# Patient Record
Sex: MALE | Race: WHITE
[De-identification: names, ages, dates, MRNs, and addresses within clinical notes are randomized per-mention and may not be internally consistent; named-entity substitution may affect disease eponyms.]

---

## 2014-05-26 VITALS — DIASTOLIC BLOOD PRESSURE: 88 MMHG | SYSTOLIC BLOOD PRESSURE: 158 MMHG

## 2017-01-25 ENCOUNTER — HOSPITAL ENCOUNTER (INPATIENT)
Dept: HOSPITAL 61 - ER | Age: 72
LOS: 2 days | Discharge: HOME | DRG: 149 | End: 2017-01-27
Attending: INTERNAL MEDICINE | Admitting: INTERNAL MEDICINE
Payer: MEDICARE

## 2017-01-25 ENCOUNTER — HOSPITAL ENCOUNTER (OUTPATIENT)
Dept: HOSPITAL 61 - ECHO | Age: 72
Discharge: HOME | End: 2017-01-25
Attending: INTERNAL MEDICINE
Payer: MEDICARE

## 2017-01-25 VITALS — SYSTOLIC BLOOD PRESSURE: 105 MMHG | DIASTOLIC BLOOD PRESSURE: 39 MMHG

## 2017-01-25 VITALS — DIASTOLIC BLOOD PRESSURE: 91 MMHG | SYSTOLIC BLOOD PRESSURE: 114 MMHG

## 2017-01-25 VITALS — DIASTOLIC BLOOD PRESSURE: 65 MMHG | SYSTOLIC BLOOD PRESSURE: 111 MMHG

## 2017-01-25 VITALS — WEIGHT: 143.25 LBS | BODY MASS INDEX: 21.71 KG/M2 | HEIGHT: 68 IN

## 2017-01-25 DIAGNOSIS — I95.9: ICD-10-CM

## 2017-01-25 DIAGNOSIS — I10: ICD-10-CM

## 2017-01-25 DIAGNOSIS — H81.399: Primary | ICD-10-CM

## 2017-01-25 DIAGNOSIS — Z86.73: ICD-10-CM

## 2017-01-25 DIAGNOSIS — R42: ICD-10-CM

## 2017-01-25 DIAGNOSIS — I25.119: Primary | ICD-10-CM

## 2017-01-25 DIAGNOSIS — M54.9: ICD-10-CM

## 2017-01-25 DIAGNOSIS — Z98.61: ICD-10-CM

## 2017-01-25 DIAGNOSIS — Z79.82: ICD-10-CM

## 2017-01-25 DIAGNOSIS — Z88.8: ICD-10-CM

## 2017-01-25 DIAGNOSIS — E55.9: ICD-10-CM

## 2017-01-25 DIAGNOSIS — E11.9: ICD-10-CM

## 2017-01-25 DIAGNOSIS — I25.10: ICD-10-CM

## 2017-01-25 DIAGNOSIS — R06.09: ICD-10-CM

## 2017-01-25 DIAGNOSIS — R51: ICD-10-CM

## 2017-01-25 DIAGNOSIS — I25.2: ICD-10-CM

## 2017-01-25 DIAGNOSIS — I34.0: ICD-10-CM

## 2017-01-25 DIAGNOSIS — M19.90: ICD-10-CM

## 2017-01-25 LAB
ANION GAP SERPL CALC-SCNC: 11 MMOL/L (ref 6–14)
BASOPHILS # BLD AUTO: 0 X10^3/UL (ref 0–0.2)
BASOPHILS NFR BLD: 0 % (ref 0–3)
BUN SERPL-MCNC: 14 MG/DL (ref 8–26)
CALCIUM SERPL-MCNC: 8.6 MG/DL (ref 8.5–10.1)
CHLORIDE SERPL-SCNC: 103 MMOL/L (ref 98–107)
CO2 SERPL-SCNC: 28 MMOL/L (ref 21–32)
CREAT SERPL-MCNC: 1.3 MG/DL (ref 0.7–1.3)
EOSINOPHIL NFR BLD: 0 % (ref 0–3)
ERYTHROCYTE [DISTWIDTH] IN BLOOD BY AUTOMATED COUNT: 14.5 % (ref 11.5–14.5)
GFR SERPLBLD BASED ON 1.73 SQ M-ARVRAT: 54.3 ML/MIN
GLUCOSE SERPL-MCNC: 99 MG/DL (ref 70–99)
HCT VFR BLD CALC: 39.2 % (ref 39–53)
HGB BLD-MCNC: 12.9 G/DL (ref 13–17.5)
LYMPHOCYTES # BLD: 0.5 X10^3/UL (ref 1–4.8)
LYMPHOCYTES NFR BLD AUTO: 6 % (ref 24–48)
MCH RBC QN AUTO: 29 PG (ref 25–35)
MCHC RBC AUTO-ENTMCNC: 33 G/DL (ref 31–37)
MCV RBC AUTO: 87 FL (ref 79–100)
MONOCYTES NFR BLD: 12 % (ref 0–9)
NEUTROPHILS NFR BLD AUTO: 82 % (ref 31–73)
PLATELET # BLD AUTO: 230 X10^3/UL (ref 140–400)
POTASSIUM SERPL-SCNC: 3.5 MMOL/L (ref 3.5–5.1)
RBC # BLD AUTO: 4.52 X10^6/UL (ref 4.3–5.7)
SODIUM SERPL-SCNC: 142 MMOL/L (ref 136–145)
WBC # BLD AUTO: 8 X10^3/UL (ref 4–11)

## 2017-01-25 PROCEDURE — 72125 CT NECK SPINE W/O DYE: CPT

## 2017-01-25 PROCEDURE — 70450 CT HEAD/BRAIN W/O DYE: CPT

## 2017-01-25 PROCEDURE — 85027 COMPLETE CBC AUTOMATED: CPT

## 2017-01-25 PROCEDURE — 82607 VITAMIN B-12: CPT

## 2017-01-25 PROCEDURE — 93306 TTE W/DOPPLER COMPLETE: CPT

## 2017-01-25 PROCEDURE — 70551 MRI BRAIN STEM W/O DYE: CPT

## 2017-01-25 PROCEDURE — 82550 ASSAY OF CK (CPK): CPT

## 2017-01-25 PROCEDURE — 80061 LIPID PANEL: CPT

## 2017-01-25 PROCEDURE — 84443 ASSAY THYROID STIM HORMONE: CPT

## 2017-01-25 PROCEDURE — 93005 ELECTROCARDIOGRAM TRACING: CPT

## 2017-01-25 PROCEDURE — 80048 BASIC METABOLIC PNL TOTAL CA: CPT

## 2017-01-25 PROCEDURE — 36415 COLL VENOUS BLD VENIPUNCTURE: CPT

## 2017-01-25 PROCEDURE — 82306 VITAMIN D 25 HYDROXY: CPT

## 2017-01-25 RX ADMIN — ASPIRIN 81 MG CHEWABLE TABLET SCH MG: 81 TABLET CHEWABLE at 19:58

## 2017-01-25 NOTE — CARD
--------------- APPROVED REPORT --------------





EXAM: Two-dimensional and M-mode echocardiogram with Doppler and color Doppler.



Other Information 

Quality : GoodHR: 104bpm

Rhythm : Tachycardia



INDICATION

Dizziness and Vertigo 

Dyspnea 

ARITA



2D DIMENSIONS 

RVDd2.4 (2.9-3.5cm)Left Atrium(2D)2.5 (1.6-4.0cm)

IVSd1.0 (0.7-1.1cm)Aortic Root(2D)2.9 (2.0-3.7cm)

LVDd3.8 (3.9-5.9cm)LVOT Diameter2.4 (1.8-2.4cm)

PWd1.0 (0.7-1.1cm)LVDs2.5 (2.5-4.0cm)

FS (%) 34.3 %SV40.5 ml

LVEF(%)64.0 (>50%)



Aortic Valve

AoV Peak Efe.137.9cm/sAoV VTI23.8cm

AO Peak GR.7.6mmHgLVOT Peak Efe.113.7cm/s

AO Mean GR.4mmHgAVA (VMAX)3.71cm2



Mitral Valve

MV E Bcqarqmo85.2cm/sMV E Peak Gr.5mmHg

MV DECEL [id number] A Cqryytsg229.4cm/s

MV E Mean Gr.2mmHgE/A  Ratio0.7

MV A [id number]



Pulmonary Valve

PV Peak Vianhdmn00.6cm/s



Pulmonary Vein

S1 Utxyoqot56.0cm/sD2 Cixfmitn40.3cm/s

PVa [id number]



 LEFT VENTRICLE 

The left ventricle is normal size. There is normal left ventricular wall thickness. The left ventricu
lar systolic function is normal and the ejection fraction is within normal range. The Ejection Fracti
on is 64%. There is normal LV segmental wall motion. Transmitral Doppler flow pattern is Grade I-abno
rmal relaxation pattern.



 RIGHT VENTRICLE 

The right ventricle is normal size. There is normal right ventricular wall thickness. The right ventr
icular systolic function is normal.



 ATRIA 

The left atrium is moderately dilated. The right atrium size is normal. The interatrial septum is int
act with no evidence for an atrial septal defect or patent foramen ovale as noted on 2-D or Doppler i
maging.



 AORTIC VALVE 

The aortic valve is trileaflet and mildly sclerotic. Doppler and Color Flow revealed no significant a
ortic valve regurgitation or stenosis.



 MITRAL VALVE 

Mitral annular calcification is mild. The mitral valve leaflets are thickened. There is no evidence o
f mitral valve prolapse or stenosis. Doppler and Color Flow revealed trace mitral regurgitation.



 TRICUSPID VALVE 

The tricuspid valve is normal in structure and function. Doppler and Color Flow revealed no tricuspid
 valve regurgitation noted.



 PULMONIC VALVE 

The pulmonary valve is normal in structure and function. Doppler and Color Flow revealed no pulmonic 
valvular regurgitation.



 GREAT VESSELS 

The aortic root is normal in size. The ascending aorta is normal in size. The pulmonary artery is nor
mal. The IVC is normal in size and collapses >50% with inspiration.



 PERICARDIAL EFFUSION 

There is no evidence of significant pericardial effusion.



Critical Notification

Critical Value: No



<Conclusion>

The left ventricular systolic function is normal and the ejection fraction is within normal range.

The Ejection Fraction is 64%.

Transmitral Doppler flow pattern is Grade I-abnormal relaxation pattern.

The right ventricular systolic function is normal.

The left atrium is moderately dilated.

The right atrium size is normal.

The aortic valve is trileaflet and mildly sclerotic.

Doppler and Color Flow revealed no significant aortic valve regurgitation or stenosis.

Doppler and Color Flow revealed trace mitral regurgitation.

Mitral annular calcification is mild.

The mitral valve leaflets are thickened.

The tricuspid valve is normal in structure and function.

The pulmonary valve is normal in structure and function.

There is no evidence of significant pericardial effusion.

## 2017-01-25 NOTE — ACF
Admission Forms Criteria


                                                     [facility]





Clinical Indications for Admission to Inpatient Care





                                                            (Place 'X' for any 

and all applicable criteria):





Admission is indicated for ANY ONE of the following(1)(2)(3)(4):


[ ]I.     Acute bacterial labyrinthitis


[ ]II.    A suspected etiology that requires admission for treatment 


[X]III.   Inpatient admission required rather than observation care (Also use 

[facility]: Observation Care as 


         appropriate) because of ANY ONE of the following:


          [ ]a)   Hemodynamic instability that is severe or persistent


          [X]b)   Signs or symptoms that are severe or persistent (eg, vomiting

, orthostasis, inability to ambulate)


          [ ]c)   Cardiac arrhythmias of immediate concern


          [ ]d)   Severe (new) neurologic findings requiring inpatient care as 

indicated by ANY ONE of the following(6)(7)


                    [ ]1)   Cerebral bleeding, ischemia, or vasospasm(8)(9)


                    [ ]2)   Increased intracranial pressure or hydrocephalus(10)

(11)(12)


                    [ ]3)   Papilledema              


                    [ ]4)   Cerebral edema         


                    [ ]5)   Mass effect on CT scan


          [ ]e)   Vomiting that is severe or persistent


          [ ]f)    Continuous IV  infusion of anticoagulation, platelet 

inhibitor, vasoactive, or antiarrhythmic medication


          [ ]g)   Cerebral bleeding, hydrocephalus, or vasospasm monitoring(14)


          [ ]h)   Increased intracranial pressure or cerebral edema monitoring


          [ ]i)    Other condition, treatment or monitoring requiring inpatient 

admission


[ ]IV.  Cerebellar, brainstem, or cerebral ischemia or hemorrhage(5)











Extended stay beyond goal length of stay may be needed for evaluating and 

treating a 


specific cause of dizziness, including(26):


[ ]a)     Head injury (27) ( Also use Traumatic Brain Injury, Nonsurgical 

Treatment  guideline) 


[ ]b)     New-onset vertebrobasilar vascular insufficiency(23)


[ ]c)     Acute Meniere disease with intractable symptoms                


[ ]d)     Cardiac arrhythmias or conduction defects                      


[ ]e)     Acute neurologic event causing dizziness                       


[ ]f)      Myocardial ischemia


[ ]g)     Acute bacterial labyrinthitis(1)


[ ]h)     Severe acute vestibular neuronitis(18)


 





The original Franklyn Braden[employer]smake content created by Franklyn Kennedy has been revised. 


The portions of the content which have been revised are identified through the 

use of italic text or in bold, and Franklyn Kennedy 


has neither reviewed nor approved the modified material. All other unmodified 

content is copyright  [facility].





Please see references footnoted in the original [facility] edition 

2016


Admission Criteria Met?:  Yes








ANDREWS ELIZALDE Jan 25, 2017 17:25

## 2017-01-25 NOTE — PDOC2
NEUROLOGY CONSULT


Date of Admission


Date of Admission


DATE: 1/25/17 


TIME: 17:47





Reason for Consult


Reason for Consult:


IMPRESSION:


Blurred vision.


Dizziness.


Light headedness.


Gait instability.


Headaches


CAD


Hx of MI s/p angioplasty





RECOMMENDATIONS/PLAN:


ASA daily.


Brain MRI w/o contrast.


Lab: see orders.


OT/PT


Discussed with his wife at bedside.





HISTORY OF THE PRESENT ILLNESS:


71-y-old  male patient with above medical diseases developed symptoms 

of blurred vision, dizziness, light headedness, gait instability and headaches 

for about 3 days. His symptoms not resolving, but became more obvious 

especially trouble walking.





PAST MEDICAL HISTORY:


Please see above.





PAST SURGERY HISTORY: 


Angioplasty


Hernia Repair





ALLERGY:


Reviewed.





MEDICATIONS:


Refer to MAR





FAMILY HISTORY: 


Non contributory.





SOCIAL HISTORY: 


Lives at home.


Denies smoking, drinking, and illicit drug use.  


He stated he worked in [employer] for 40 years.





REVIEW OF SYSTEMS:


Constitutional: No malnutrition, weight loss, cachexia.


Head: No traumatic brain or head injury.


Skin: No edema, or rash.


Ear: No infection, tinnitus.


Eyes: No vision loss or color blindness.


Nose: No bleeding or purulent discharges.


Hearing: Hearing decrease.


Neck: No injury.


Cardiac: CAD, angioplasty


Pulmonary: No COPD.


GI: No GI ulcer, GI bleeding.


Urinary/genital: No dysuria, hematuria, incontinence, urinary retention.


Endocrinologic: No cousin face, craniofacial dysmorphism, polydactyly, goiter.


Skeletomuscular: No muscular atrophy, deformity.


Neurological: see  HP.


Psychiatric: Denies drug use/abuse.


Otherwise, not pertinant14-point review of systems.





PHYSICAL EXAMINATION:   


General appearance is in mild acute distress.  


HEENT:  Normocephalic and nontraumatic.  Eyes, nose, ears, and throat are 

unremarkable.  


Neck is supple. No lymphadenopathy. No bruits are heard over the carotid 

artery.  No crepitus. 


Cardiovascular:  S1, S2, regular rate and rhythm.  


Pulmonary:  Clear to auscultation bilaterally. 


Abdomen:  Bowel sounds are positive.     


Extremities:  No rash, lesions, or edema.  


No restriction of range of motion





NEUROLOGICAL  EXAMINATION:


Alert 


Oriented to time, place and person.


PERRL.


EOMI.


CN: no focal findings.


Muscle tone: within normal.


Muscle strength: 5


DTR: 2


Plantar reflex: Flexor response bilaterally 


Gait: not examined in bed. 


Sensory exam: no abnormal findings.


No cerebellar signs elicited.


F-T-N test fine.





Current Medications


Current Medications





 Current Medications


Meclizine HCl (Antivert) 12.5 mg 1X  ONCE PO  Last administered on 1/25/17at 13:

32;  Start 1/25/17 at 13:15;  Stop 1/25/17 at 13:16;  Status DC


Ondansetron HCl (Zofran) 4 mg PRN Q8HRS  PRN IV NAUSEA/VOMITING;  Start 1/25/17 

at 15:30;  Stop 1/26/17 at 15:29


Acetaminophen (Tylenol) 650 mg PRN Q4HRS  PRN PO FEVER;  Start 1/25/17 at 15:30

;  Stop 1/26/17 at 15:29





Active Scripts


Active


Reported


Aspirin 325 Mg Tablet 1 Tab PO DAILY 


[none]





Allergies


Allergies:  


Coded Allergies:  


     ether (Verified  Allergy, Severe, "N/V", 5/26/14)





Vitals


VITALS





 Vital Signs








  Date Time  Temp Pulse Resp B/P Pulse Ox O2 Delivery O2 Flow Rate FiO2


 


1/25/17 17:32      Room Air  


 


1/25/17 17:30 101.4 99  114/91 95   





 101.4       


 


1/25/17 16:15   18     











Labs


Labs





Laboratory Tests








Test


  1/25/17


12:28


 


White Blood Count


  8.0x10^3/uL


(4.0-11.0)


 


Red Blood Count


  4.52x10^6/uL


(4.30-5.70)


 


Hemoglobin


  12.9g/dL


(13.0-17.5)


 


Hematocrit


  39.2%


(39.0-53.0)


 


Mean Corpuscular Volume 87fL () 


 


Mean Corpuscular Hemoglobin 29pg (25-35) 


 


Mean Corpuscular Hemoglobin


Concent 33g/dL (31-37) 


 


 


Red Cell Distribution Width


  14.5%


(11.5-14.5)


 


Platelet Count


  230x10^3/uL


(140-400)


 


Neutrophils (%) (Auto) 82% (31-73) 


 


Lymphocytes (%) (Auto) 6% (24-48) 


 


Monocytes (%) (Auto) 12% (0-9) 


 


Eosinophils (%) (Auto) 0% (0-3) 


 


Basophils (%) (Auto) 0% (0-3) 


 


Neutrophils # (Auto)


  6.6x10^3uL


(1.8-7.7)


 


Lymphocytes # (Auto)


  0.5x10^3/uL


(1.0-4.8)


 


Monocytes # (Auto)


  0.9x10^3/uL


(0.0-1.1)


 


Eosinophils # (Auto)


  0.0x10^3/uL


(0.0-0.7)


 


Basophils # (Auto)


  0.0x10^3/uL


(0.0-0.2)


 


Sodium Level


  142mmol/L


(136-145)


 


Potassium Level


  3.5mmol/L


(3.5-5.1)


 


Chloride Level


  103mmol/L


()


 


Carbon Dioxide Level


  28mmol/L


(21-32)


 


Anion Gap 11 (6-14) 


 


Blood Urea Nitrogen 14mg/dL (8-26) 


 


Creatinine


  1.3mg/dL


(0.7-1.3)


 


Estimated GFR


(Cockcroft-Gault) 54.3 


 


 


Glucose Level


  99mg/dL


(70-99)


 


Calcium Level


  8.6mg/dL


(8.5-10.1)


 


Creatine Kinase 74U/L () 


 


Thyroid Stimulating Hormone


(TSH) 1.173uIU/mL


(0.358-3.74)








Laboratory Tests








Test


  1/25/17


12:28


 


White Blood Count


  8.0x10^3/uL


(4.0-11.0)


 


Red Blood Count


  4.52x10^6/uL


(4.30-5.70)


 


Hemoglobin


  12.9g/dL


(13.0-17.5)


 


Hematocrit


  39.2%


(39.0-53.0)


 


Mean Corpuscular Volume 87fL () 


 


Mean Corpuscular Hemoglobin 29pg (25-35) 


 


Mean Corpuscular Hemoglobin


Concent 33g/dL (31-37) 


 


 


Red Cell Distribution Width


  14.5%


(11.5-14.5)


 


Platelet Count


  230x10^3/uL


(140-400)


 


Neutrophils (%) (Auto) 82% (31-73) 


 


Lymphocytes (%) (Auto) 6% (24-48) 


 


Monocytes (%) (Auto) 12% (0-9) 


 


Eosinophils (%) (Auto) 0% (0-3) 


 


Basophils (%) (Auto) 0% (0-3) 


 


Neutrophils # (Auto)


  6.6x10^3uL


(1.8-7.7)


 


Lymphocytes # (Auto)


  0.5x10^3/uL


(1.0-4.8)


 


Monocytes # (Auto)


  0.9x10^3/uL


(0.0-1.1)


 


Eosinophils # (Auto)


  0.0x10^3/uL


(0.0-0.7)


 


Basophils # (Auto)


  0.0x10^3/uL


(0.0-0.2)


 


Sodium Level


  142mmol/L


(136-145)


 


Potassium Level


  3.5mmol/L


(3.5-5.1)


 


Chloride Level


  103mmol/L


()


 


Carbon Dioxide Level


  28mmol/L


(21-32)


 


Anion Gap 11 (6-14) 


 


Blood Urea Nitrogen 14mg/dL (8-26) 


 


Creatinine


  1.3mg/dL


(0.7-1.3)


 


Estimated GFR


(Cockcroft-Gault) 54.3 


 


 


Glucose Level


  99mg/dL


(70-99)


 


Calcium Level


  8.6mg/dL


(8.5-10.1)


 


Creatine Kinase 74U/L () 


 


Thyroid Stimulating Hormone


(TSH) 1.173uIU/mL


(0.358-3.74)














ALBANIA PARKER MD Jan 25, 2017 17:56

## 2017-01-25 NOTE — PHYS DOC
Past Medical History


Past Medical History:  Diabetes-Type II, MI, Other


Additional Past Medical Histor:  back pain


Past Surgical History:  Angioplasty, Other


Additional Past Surgical Histo:  hernia surgery x 6, wrist surgery, hiatal 

hernia surgery


Alcohol Use:  None


Drug Use:  None





Adult General


Chief Complaint


Chief Complaint:  HYPOTENSION





HPI


HPI


Patient is a 71  year old male who presents with severe intermittent dizziness/

lightheadedness has been worsening over the past 2 weeks. States that he is 

using hand crutches to assist ambulation today. His symptoms are sudden in onset

, worse with sitting up or head movements, lasting seconds to minutes, improves 

with being still. He has spinning sensation and his blurry vision when he is 

symptomatic. He notes mild headache over the past 3 days. He also has recent 

rhinorrhea, nasal congestion, and dry cough. He denies fever or chills, numbness

, tingling, weakness, nausea or vomiting, diarrhea, dysuria.





Review of Systems


Review of Systems





Constitutional: Denies fever or chills []


Eyes: Denies change in visual acuity, redness, or eye pain []


HENT: Denies nasal congestion or sore throat []


Respiratory: Denies shortness of breath []


Cardiovascular: No additional information not addressed in HPI []


GI: Denies abdominal pain, nausea, vomiting, bloody stools or diarrhea []


: Denies dysuria or hematuria []


Musculoskeletal: Denies back pain or joint pain []


Integument: Denies rash or skin lesions []


Neurologic: Denies focal weakness or sensory changes []


Endocrine: Denies polyuria or polydipsia []





Current Medications


Current Medications





 Current Medications








 Medications


  (Trade)  Dose


 Ordered  Sig/Joie  Start Time


 Stop Time Status Last Admin


Dose Admin


 


 Meclizine HCl


  (Antivert)  12.5 mg  1X  ONCE  1/25/17 13:15


 1/25/17 13:16 DC 1/25/17 13:32


12.5 MG











Allergies


Allergies





 Allergies








Coded Allergies Type Severity Reaction Last Updated Verified


 


  ether Allergy Severe "N/V" 5/26/14 Yes











Physical Exam


Physical Exam





Constitutional: Well developed, well nourished, no acute distress, non-toxic 

appearance. []


HENT: Normocephalic, atraumatic, bilateral external ears normal, oropharynx 

moist, no oral exudates, nose normal. []


Eyes: PERRLA, EOMI. [] 


Neck: Normal range of motion, supple. [] 


Cardiovascular:Heart rate regular rhythm []


Lungs & Thorax:  Bilateral breath sounds clear to auscultation []


Abdomen: Bowel sounds normal, soft, no tenderness. [] 


Skin: Warm, dry, no erythema, no rash. [] 


Back: No tenderness, no CVA tenderness. [] 


Extremities: No tenderness, ROM intact, no edema. Ambulatory with a unsteady 

gait [] 


Neurologic: Alert and oriented X 3, normal motor function, normal sensory 

function, no focal deficits noted, cranial nerves II through XII intact, no 

nystagmus, no limb ataxia. []


Psychologic: Affect normal, judgement normal, mood normal. []





Current Patient Data


Vital Signs





 Vital Signs








  Date Time  Temp Pulse Resp B/P Pulse Ox O2 Delivery O2 Flow Rate FiO2


 


1/25/17 14:00  90 18 97/65 99 Room Air  


 


1/25/17 12:19 97.9       





 97.9       








Lab Values





 Laboratory Tests








Test


  1/25/17


12:28


 


White Blood Count


  8.0x10^3/uL


(4.0-11.0)


 


Red Blood Count


  4.52x10^6/uL


(4.30-5.70)


 


Hemoglobin


  12.9g/dL


(13.0-17.5)  L


 


Hematocrit


  39.2%


(39.0-53.0)


 


Mean Corpuscular Volume 87fL ()  


 


Mean Corpuscular Hemoglobin 29pg (25-35)  


 


Mean Corpuscular Hemoglobin


Concent 33g/dL (31-37)


 


 


Red Cell Distribution Width


  14.5%


(11.5-14.5)


 


Platelet Count


  230x10^3/uL


(140-400)


 


Neutrophils (%) (Auto) 82% (31-73)  H


 


Lymphocytes (%) (Auto) 6% (24-48)  L


 


Monocytes (%) (Auto) 12% (0-9)  H


 


Eosinophils (%) (Auto) 0% (0-3)  


 


Basophils (%) (Auto) 0% (0-3)  


 


Neutrophils # (Auto)


  6.6x10^3uL


(1.8-7.7)


 


Lymphocytes # (Auto)


  0.5x10^3/uL


(1.0-4.8)  L


 


Monocytes # (Auto)


  0.9x10^3/uL


(0.0-1.1)


 


Eosinophils # (Auto)


  0.0x10^3/uL


(0.0-0.7)


 


Basophils # (Auto)


  0.0x10^3/uL


(0.0-0.2)


 


Sodium Level


  142mmol/L


(136-145)


 


Potassium Level


  3.5mmol/L


(3.5-5.1)


 


Chloride Level


  103mmol/L


()


 


Carbon Dioxide Level


  28mmol/L


(21-32)


 


Anion Gap 11 (6-14)  


 


Blood Urea Nitrogen


  14mg/dL (8-26)


 


 


Creatinine


  1.3mg/dL


(0.7-1.3)


 


Estimated GFR


(Cockcroft-Gault) 54.3  


 


 


Glucose Level


  99mg/dL


(70-99)


 


Calcium Level


  8.6mg/dL


(8.5-10.1)





 Laboratory Tests


1/25/17 12:28








 Laboratory Tests


1/25/17 12:28














EKG


EKG


EKG as interpreted by me as normal sinus rhythm, rate 94, no ST-T changes, 

normal intervals, no ectopy





Radiology/Procedures


Radiology/Procedures


Chest xray as interpreted by me with no acute cardiopulmonary disease process





Head CT without contrast


Impression: No acute intracranial process is detected.





CT cervical spine:


Impression: No acute bony abnormality is detected.





DICTATED and SIGNED BY:     CRISTINA EPPERSON MD


DATE:     01/25/17 5813





Course & Med Decision Making


Course & Med Decision Making


Pertinent Labs and Imaging studies reviewed. (See chart for details)





Workup is unremarkable. Still has symptoms of severe vertigo with trial of 

ambulation. He does not feel he can ambulate safely to return home. Will admit 

for symptom control and neurology evaluation. He was seen and examined with Dr. Ovalle at bedside, who agrees to admit.





Dragon Disclaimer


Dragon Disclaimer


This electronic medical record was generated, in whole or in part, using a 

voice recognition dictation system.





Departure


Departure


Impression:  


 Primary Impression:  


 Vertigo


Disposition:  09 ADMITTED AS INPATIENT


Condition:  STABLE


Referrals:  


RICKY CORTEZ Jr, MD (PCP)








CONOR VIEYRA MD Jan 25, 2017 15:05

## 2017-01-25 NOTE — EKG
[facility]

              [address] [place] [address]

Test Date:    2017               Test Time:    12:27:51

Pat Name:     EVERETTE MONTEMAYOR        Department:   

Patient ID:   [id number]           Room:          

Gender:       M                        Technician:   

:          [id number]               Requested By: CONOR VIEYRA

Order Number: [id number]            Reading MD:   Yakov De La Rosa

                                 Measurements

Intervals                              Axis          

Rate:         94                       P:            47

RI:           176                      QRS:          1

QRSD:         80                       T:            20

QT:           322                                    

QTc:          408                                    

                           Interpretive Statements

SINUS RHYTHM

NO SPECIFIC ECG ABNORMALITIES



Electronically Signed On 2017 15:16:46 CST by Yakov De La Rosa

## 2017-01-25 NOTE — RAD
Indication: Dizziness.



Axial imaging through the brain and cervical spine was performed without

contrast. Sagittal and coronal reformations of the cervical spine were also

performed.



CT brain:



The ventricles and sulci are within normal limits. No sulcal effacement,

midline shift or hemorrhage is detected. The cisterns are patent. The

visualized paranasal sinuses are clear.



Impression: No acute intracranial process is detected.



CT cervical spine:



Curvature and alignment is normal. The prevertebral tissues are normal. No

fracture or subluxation is identified. The odontoid is intact.



Impression: No acute bony abnormality is detected.





















PQRS Compliance Statement:



One or more of the following individualized dose reduction techniques were

utilized for this examination:  

1. Automated exposure control  

2. Adjustment of the mA and/or kV according to patient size  

3. Use of iterative reconstruction technique

## 2017-01-26 VITALS — DIASTOLIC BLOOD PRESSURE: 57 MMHG | SYSTOLIC BLOOD PRESSURE: 93 MMHG

## 2017-01-26 VITALS — SYSTOLIC BLOOD PRESSURE: 123 MMHG | DIASTOLIC BLOOD PRESSURE: 62 MMHG

## 2017-01-26 VITALS — SYSTOLIC BLOOD PRESSURE: 124 MMHG | DIASTOLIC BLOOD PRESSURE: 59 MMHG

## 2017-01-26 VITALS — DIASTOLIC BLOOD PRESSURE: 70 MMHG | SYSTOLIC BLOOD PRESSURE: 128 MMHG

## 2017-01-26 VITALS — SYSTOLIC BLOOD PRESSURE: 107 MMHG | DIASTOLIC BLOOD PRESSURE: 64 MMHG

## 2017-01-26 VITALS — DIASTOLIC BLOOD PRESSURE: 80 MMHG | SYSTOLIC BLOOD PRESSURE: 124 MMHG

## 2017-01-26 VITALS — SYSTOLIC BLOOD PRESSURE: 104 MMHG | DIASTOLIC BLOOD PRESSURE: 54 MMHG

## 2017-01-26 VITALS — DIASTOLIC BLOOD PRESSURE: 64 MMHG | SYSTOLIC BLOOD PRESSURE: 103 MMHG

## 2017-01-26 VITALS — SYSTOLIC BLOOD PRESSURE: 122 MMHG | DIASTOLIC BLOOD PRESSURE: 67 MMHG

## 2017-01-26 LAB
VIT B12 SERPL-MCNC: 299 PG/ML (ref 211–946)
VITAMIN D25(OH)TOTAL: 21 NG/ML (ref 30–100)

## 2017-01-26 RX ADMIN — ACETAMINOPHEN PRN MG: 325 TABLET ORAL at 12:02

## 2017-01-26 RX ADMIN — CALCIUM CARBONATE (ANTACID) CHEW TAB 500 MG SCH MG: 500 CHEW TAB at 16:51

## 2017-01-26 RX ADMIN — CALCIUM CARBONATE (ANTACID) CHEW TAB 500 MG SCH MG: 500 CHEW TAB at 11:30

## 2017-01-26 RX ADMIN — CHOLECALCIFEROL CAP 125 MCG (5000 UNIT) SCH UNIT: 125 CAP at 12:02

## 2017-01-26 RX ADMIN — ACETAMINOPHEN PRN MG: 325 TABLET ORAL at 00:01

## 2017-01-26 RX ADMIN — ASPIRIN 81 MG CHEWABLE TABLET SCH MG: 81 TABLET CHEWABLE at 10:17

## 2017-01-26 NOTE — PDOC1
History and Physical


Date of Admission


Date of Admission


DATE: 1/25/17 


TIME: 14:20





Identification/Chief Complaint


Chief Complaint


Vertigo





Source


Source:  Chart review, Patient





History of Present Illness


History of Present Illness


Patient is a pleasant 71 year old male with history of CAD and DDD who 

presented from my office with hypotension and dizziness. He has been having 

episodes of dizziness that can last anywhere from 30 seconds to 1 hour for the 

past 2-3 weeks. Describes episodes as lightheadedness and spinning, worse with 

flexion and extension of the neck. He denies syncope, but frequently has to 

crawl on the floor and is unable to stand due to the severity of dizziness. He 

is using a wheelchair at home. Has had a bout of vertigo in the past. Since 1/24 /17, he also notes having rhinorrhea and a dry, hacking cough occasionally 

productive of white sputum. Denies chest pain, dyspnea, nausea, vomiting, 

diarrhea. 





In the ED, a head and neck CT was performed, both were negative for acute 

abnormalities. Patient was having significant dizziness on ambulation and was 

admitted for further workup of dizziness.





Past Medical History


Cardiovascular:  CAD, MI


Musculoskeletal:  Other (DDD)





Past Surgical History


Past Surgical History


angioplasty, hernia repair





Current Problem List


Problem List


 Problems


Medical Problems:


(1) Vertigo


Status: Acute  








Problems:  





Current Medications


Current Medications





 Current Medications


Meclizine HCl (Antivert) 12.5 mg 1X  ONCE PO  Last administered on 1/25/17at 13:

32;  Start 1/25/17 at 13:15;  Stop 1/25/17 at 13:16;  Status DC


Ondansetron HCl (Zofran) 4 mg PRN Q8HRS  PRN IV NAUSEA/VOMITING;  Start 1/25/17 

at 15:30;  Stop 1/26/17 at 15:29


Acetaminophen (Tylenol) 650 mg PRN Q4HRS  PRN PO FEVER Last administered on 1/26 /17at 12:02;  Start 1/25/17 at 15:30;  Stop 1/26/17 at 15:29


Aspirin (Children'S Aspirin) 81 mg DAILYWBKFT PO  Last administered on 1/26/ 17at 10:17;  Start 1/25/17 at 19:00


Vitamin D (Vitamin D3) 5,000 unit DAILY PO  Last administered on 1/26/17at 12:02

;  Start 1/26/17 at 11:00


Calcium Carbonate/ Glycine (Tums) 500 mg BIDWMEALS PO ;  Start 1/26/17 at 11:30





Active Scripts


Active


Reported


Aspirin 325 Mg Tablet 1 Tab PO DAILY


[none]





Allergies


Allergies:  


Coded Allergies:  


     ether (Verified  Allergy, Severe, "N/V", 5/26/14)





Physical Exam


Physical Exam


General: AAOx3, no acute distress


HEENT: PERRLA, EOMI, no effusions behind TMs


Heart: regular rate, normal S1, normal S2


Lungs: Diffuse mid fields crackles 


Abdomen: soft, nontender, nondistended, bowel sounds present


Neuro: CNII-XII intact, no ataxia noted


Extremities: no pretibial edema, pulses +2/4 bilaterally all extremities





Vitals


Vitals





 Vital Signs








  Date Time  Temp Pulse Resp B/P Pulse Ox O2 Delivery O2 Flow Rate FiO2


 


1/26/17 10:58 101.3 89 18 128/70 93 Room Air  





 101.3       











Labs


Labs





Laboratory Tests








Test


  1/25/17


12:28


 


White Blood Count


  8.0x10^3/uL


(4.0-11.0)


 


Red Blood Count


  4.52x10^6/uL


(4.30-5.70)


 


Hemoglobin


  12.9g/dL


(13.0-17.5)


 


Hematocrit


  39.2%


(39.0-53.0)


 


Mean Corpuscular Volume 87fL () 


 


Mean Corpuscular Hemoglobin 29pg (25-35) 


 


Mean Corpuscular Hemoglobin


Concent 33g/dL (31-37) 


 


 


Red Cell Distribution Width


  14.5%


(11.5-14.5)


 


Platelet Count


  230x10^3/uL


(140-400)


 


Neutrophils (%) (Auto) 82% (31-73) 


 


Lymphocytes (%) (Auto) 6% (24-48) 


 


Monocytes (%) (Auto) 12% (0-9) 


 


Eosinophils (%) (Auto) 0% (0-3) 


 


Basophils (%) (Auto) 0% (0-3) 


 


Neutrophils # (Auto)


  6.6x10^3uL


(1.8-7.7)


 


Lymphocytes # (Auto)


  0.5x10^3/uL


(1.0-4.8)


 


Monocytes # (Auto)


  0.9x10^3/uL


(0.0-1.1)


 


Eosinophils # (Auto)


  0.0x10^3/uL


(0.0-0.7)


 


Basophils # (Auto)


  0.0x10^3/uL


(0.0-0.2)


 


Sodium Level


  142mmol/L


(136-145)


 


Potassium Level


  3.5mmol/L


(3.5-5.1)


 


Chloride Level


  103mmol/L


()


 


Carbon Dioxide Level


  28mmol/L


(21-32)


 


Anion Gap 11 (6-14) 


 


Blood Urea Nitrogen 14mg/dL (8-26) 


 


Creatinine


  1.3mg/dL


(0.7-1.3)


 


Estimated GFR


(Cockcroft-Gault) 54.3 


 


 


Glucose Level


  99mg/dL


(70-99)


 


Calcium Level


  8.6mg/dL


(8.5-10.1)


 


Creatine Kinase 74U/L () 


 


Vitamin B12 Level


  299pg/mL


(211-946)


 


25-Hydroxy Vitamin D Total


  21.0ng/mL


(30.0-100.0)


 


Thyroid Stimulating Hormone


(TSH) 1.173uIU/mL


(0.358-3.74)








Laboratory Tests








Test


  1/25/17


12:28


 


White Blood Count


  8.0x10^3/uL


(4.0-11.0)


 


Red Blood Count


  4.52x10^6/uL


(4.30-5.70)


 


Hemoglobin


  12.9g/dL


(13.0-17.5)


 


Hematocrit


  39.2%


(39.0-53.0)


 


Mean Corpuscular Volume 87fL () 


 


Mean Corpuscular Hemoglobin 29pg (25-35) 


 


Mean Corpuscular Hemoglobin


Concent 33g/dL (31-37) 


 


 


Red Cell Distribution Width


  14.5%


(11.5-14.5)


 


Platelet Count


  230x10^3/uL


(140-400)


 


Neutrophils (%) (Auto) 82% (31-73) 


 


Lymphocytes (%) (Auto) 6% (24-48) 


 


Monocytes (%) (Auto) 12% (0-9) 


 


Eosinophils (%) (Auto) 0% (0-3) 


 


Basophils (%) (Auto) 0% (0-3) 


 


Neutrophils # (Auto)


  6.6x10^3uL


(1.8-7.7)


 


Lymphocytes # (Auto)


  0.5x10^3/uL


(1.0-4.8)


 


Monocytes # (Auto)


  0.9x10^3/uL


(0.0-1.1)


 


Eosinophils # (Auto)


  0.0x10^3/uL


(0.0-0.7)


 


Basophils # (Auto)


  0.0x10^3/uL


(0.0-0.2)


 


Sodium Level


  142mmol/L


(136-145)


 


Potassium Level


  3.5mmol/L


(3.5-5.1)


 


Chloride Level


  103mmol/L


()


 


Carbon Dioxide Level


  28mmol/L


(21-32)


 


Anion Gap 11 (6-14) 


 


Blood Urea Nitrogen 14mg/dL (8-26) 


 


Creatinine


  1.3mg/dL


(0.7-1.3)


 


Estimated GFR


(Cockcroft-Gault) 54.3 


 


 


Glucose Level


  99mg/dL


(70-99)


 


Calcium Level


  8.6mg/dL


(8.5-10.1)


 


Creatine Kinase 74U/L () 


 


Vitamin B12 Level


  299pg/mL


(211-946)


 


25-Hydroxy Vitamin D Total


  21.0ng/mL


(30.0-100.0)


 


Thyroid Stimulating Hormone


(TSH) 1.173uIU/mL


(0.358-3.74)











VTE Prophylaxis Ordered


VTE Prophylaxis Devices:  No


VTE Pharmacological Prophylaxi:  Yes





Assessment/Plan


Assessment/Plan


Likely peripheral vertigo with negative head/neck CT and history of upper 

respiratory symptoms. Will consult neurology for further workup. 


Patient febrile and with a productive cough . Will start on Levaquin 500mg IV Q 

24 hours.








LOREN SAM MD Jan 26, 2017 12:40

## 2017-01-26 NOTE — PDOC
PROGRESS NOTES


Assessment


Assessment


Blurred vision.


Dizziness.


Light headedness.


Gait instability.


Headaches


CAD


Hx of MI s/p angioplasty


Vit D deficiency.


No evidence of acute CVA this time.





RECOMMENDATIONS/PLAN:


ASA daily.


Vit D and Ca++ supplement, further management per his PCP.


Fasting lipids in a.m.


Orths HR and BP.


OT/PT


Discussed with his wife at bedside on 1/25/17.





HISTORY OF THE PRESENT ILLNESS:


71-y-old  male patient with above medical diseases developed symptoms 

of blurred vision, dizziness, light headedness, gait instability and headaches 

for about 3 days. His symptoms not resolving, but became more obvious 

especially trouble walking.





PAST MEDICAL HISTORY:


Please see above.





PAST SURGERY HISTORY: 


Angioplasty


Hernia Repair





ALLERGY:


Reviewed.





MEDICATIONS:


Refer to MAR





FAMILY HISTORY: 


Non contributory.





SOCIAL HISTORY: 


Lives at home.


Denies smoking, drinking, and illicit drug use.  


He stated he worked in [employer] for 40 years.





REVIEW OF SYSTEMS:


Constitutional: No malnutrition, weight loss, cachexia.


Head: No traumatic brain or head injury.


Skin: No edema, or rash.


Ear: No infection, tinnitus.


Eyes: No vision loss or color blindness.


Nose: No bleeding or purulent discharges.


Hearing: Hearing decrease.


Neck: No injury.


Cardiac: CAD, angioplasty


Pulmonary: No COPD.


GI: No GI ulcer, GI bleeding.


Urinary/genital: No dysuria, hematuria, incontinence, urinary retention.


Endocrinologic: No cousin face, craniofacial dysmorphism, polydactyly, goiter.


Skeletomuscular: No muscular atrophy, deformity.


Neurological: see  HP.


Psychiatric: Denies drug use/abuse.


Otherwise, not pertinant14-point review of systems.





PHYSICAL EXAMINATION:   


General appearance is in no acute distress.  


HEENT:  Normocephalic and nontraumatic.  Eyes, nose, ears, and throat are 

unremarkable.  


Neck is supple. No lymphadenopathy. No bruits are heard over the carotid 

artery.  No crepitus. 


Cardiovascular:  S1, S2, regular rate and rhythm.  


Pulmonary:  Clear to auscultation bilaterally. 


Abdomen:  Bowel sounds are positive.     


Extremities:  No rash, lesions, or edema.  


No restriction of range of motion





NEUROLOGICAL  EXAMINATION:


Alert 


Oriented to time, place and person.


PERRL.


EOMI.


CN: no focal findings.


Muscle tone: within normal.


Muscle strength: 5- to 5


DTR: 2


Plantar reflex: Flexor response bilaterally 


Gait: not examined in bed. 


Sensory exam: no abnormal findings.


No cerebellar signs elicited.


F-T-N test fine.





Objective


Objective





 Vital Signs








  Date Time  Temp Pulse Resp B/P Pulse Ox O2 Delivery O2 Flow Rate FiO2


 


1/26/17 10:58 101.3 89 18 128/70 93 Room Air  





 101.3       














 Intake and Output 


 


 1/26/17





 07:00


 


Intake Total 200 ml


 


Output Total 500 ml


 


Balance -300 ml


 


 


 


Intake Oral 200 ml


 


Output Urine Total 500 ml











Vitals Signs


Vitals





 VS - Last 72 Hours, by Label








  Date Time  Temp Pulse Resp B/P Pulse Ox O2 Delivery O2 Flow Rate FiO2


 


1/26/17 10:58 101.3 89 18 128/70 93 Room Air  





 101.3       


 


1/26/17 07:30      Room Air  


 


1/26/17 07:20 101.5 94 20 122/67 95 Room Air  





 101.5       


 


1/26/17 03:30 98.9 86 20 103/64 94 Room Air  





 98.9       


 


1/25/17 23:44 102.1 103 20 111/65 92 Room Air  





 102.1       


 


1/25/17 20:00      Room Air  


 


1/25/17 19:10 102.9 101 20 105/39 91 Room Air  





 102.9       


 


1/25/17 17:32      Room Air  


 


1/25/17 17:30 101.4 99  114/91 95 Room Air  





 101.4       


 


1/25/17 16:15 101.4 99 18 114/91 95 Room Air  





 101.4       


 


1/25/17 15:30  96 22 109/54 95 Room Air  


 


1/25/17 15:00  92 20 99/51 95 Room Air  


 


1/25/17 14:00  90 18 97/65 99 Room Air  


 


1/25/17 13:30  90 18 119/79 98 Room Air  


 


1/25/17 13:00  94 18 135/62 99 Room Air  


 


1/25/17 12:19 97.9 101 22 121/60 96 Room Air  





 97.9       











Medication


Medications





Current Medications


Acetaminophen (Tylenol) 650 mg PRN Q4HRS  PRN PO FEVER Last administered on 1/26 /17at 12:02;  Start 1/25/17 at 15:30;  Stop 1/26/17 at 15:29


Aspirin (Children'S Aspirin) 81 mg DAILYWBKFT PO  Last administered on 1/26/ 17at 10:17;  Start 1/25/17 at 19:00


Calcium Carbonate/ Glycine (Tums) 500 mg BIDWMEALS PO ;  Start 1/26/17 at 11:30


Ondansetron HCl (Zofran) 4 mg PRN Q8HRS  PRN IV NAUSEA/VOMITING;  Start 1/25/17 

at 15:30;  Stop 1/26/17 at 15:29


Vitamin D (Vitamin D3) 5,000 unit DAILY PO  Last administered on 1/26/17at 12:02

;  Start 1/26/17 at 11:00





Comment


Review of Relevant


I have reviewed the following items ophelia (where applicable) has been applied.








ALBANIA PARKER MD Jan 26, 2017 14:30

## 2017-01-26 NOTE — RAD
BRAIN W/O CONTRAST 

 

 

Indication: HEADACHES, BLURRED VISION, UNSTEADY GAIT, NO SX HX, NO PRIORS 

Reason: Blurred vision. headache, gait problems, OK FOR A.M. PER DR PARKER / 

Spl. Instructions: / History: 

 

 

 

 

 

TECHNIQUE: Axial diffusion weighted imaging was obtained. Additional 

sagittal T1, axial T1, axial FLAIR, and axial T2 weighted imaging of the 

brain was also performed. 

 

 

FINDINGS: 

There are scattered foci of FLAIR signal hyperintensity in the 

periventricular white matter which are nonspecific but most likely related

to sequelae of chronic small vessel ischemic disease. No evidence of acute

intracranial hemorrhage. No restricted diffusion to indicate acute 

infarct. No extra-axial fluid collections. No midline shift or mass 

effect. Ventricular size is appropriate. 

 

 

Midline structures have a normal anatomic configuration. Basal cisterns 

are patent. Arterial flow voids at the skull base and major dural venous 

sinuses are maintained. Globes and orbits are unremarkable. Paranasal 

sinuses and mastoid air cells are clear. 

 

 

IMPRESSION: 

 

Negative for acute or recent infarct. No acute intracranial abnormality. 

 

Electronically signed by: Elder Fierro (Jan 26, 2017 09:21:20)

## 2017-01-27 VITALS — DIASTOLIC BLOOD PRESSURE: 73 MMHG | SYSTOLIC BLOOD PRESSURE: 106 MMHG

## 2017-01-27 VITALS — SYSTOLIC BLOOD PRESSURE: 128 MMHG | DIASTOLIC BLOOD PRESSURE: 75 MMHG

## 2017-01-27 VITALS — DIASTOLIC BLOOD PRESSURE: 67 MMHG | SYSTOLIC BLOOD PRESSURE: 114 MMHG

## 2017-01-27 VITALS — SYSTOLIC BLOOD PRESSURE: 96 MMHG | DIASTOLIC BLOOD PRESSURE: 54 MMHG

## 2017-01-27 LAB
CHOLEST SERPL-MCNC: 127 MG/DL (ref 0–200)
CHOLEST/HDLC SERPL: 2.5 {RATIO}
HDLC SERPL-MCNC: 51 MG/DL (ref 40–60)
TRIGL SERPL-MCNC: 55 MG/DL (ref 0–150)

## 2017-01-27 RX ADMIN — ASPIRIN 81 MG CHEWABLE TABLET SCH MG: 81 TABLET CHEWABLE at 09:38

## 2017-01-27 RX ADMIN — CALCIUM CARBONATE (ANTACID) CHEW TAB 500 MG SCH MG: 500 CHEW TAB at 16:34

## 2017-01-27 RX ADMIN — CALCIUM CARBONATE (ANTACID) CHEW TAB 500 MG SCH MG: 500 CHEW TAB at 09:38

## 2017-01-27 RX ADMIN — CHOLECALCIFEROL CAP 125 MCG (5000 UNIT) SCH UNIT: 125 CAP at 09:38

## 2017-01-27 NOTE — PDOC3
Discharge Summary*


Date of Admission:  Jan 25, 2017


Date of Discharge:  Jan 27, 2017


Admitting Diagnosis


Left-sided weakness Problems


Medical Problems:


(1) Vertigo


Status: Acute  





Final Diagnosis


Left-sided weakness Problems


Medical Problems:


(1) Vertigo


Status: Acute  





CONSULTS


Neurology


Brief Hospital Course


This patient is a 71-year-old gentleman with a known history of hypertension 

and heart disease that has had a previous CVA.





The patient was at home and he had been having severe dizziness for over 3 days 

this To the point that he could not walk and he decided to go to my office and 

when he arrived there he was hypotensive and unable to walk complaining of left 

sided weakness therefore the patient was transferred emergently to the ER.





After the patient arrived in the ER  he was seen and evaluated and it was 

decided to admit him.





The patient had a CT done which did not show any evidence of an acute CVA.





Multiple tests were done please see the chart for that report.





The neurologists were consulted and they saw the patient and ordered further 

tests.





The patient was started on meclizine which helped with the dizziness and the 

neurologist felt that the patient had significant vertigo but no apparent 

evidence of an acute CVA.





He had been coughing and the cough was productive and he had also been having a 

fever therefore the patient was started on Levaquin.





Today the patient is doing better and I will discharge the patient at this time 

we'll send him home on his home meds and in addition to that we are going to 

send him home on meclizine and will continue to complete 7 days of the Levaquin 

500 milligrams by mouth daily.





The patient has been instructed with regards to diet activity medications and 

follow-up


Scheduled


Aspirin (Aspirin) 1 TAB PO DAILY (Reported) 


Meclizine Hcl (Meclizine Hcl) 1 TAB PO TID (Reported) 





Miscellaneous Medications


([none]) (Reported) 


Time Spent


Total time spent with patient [] minutes for coordination of care, counseling, 

and education.








LOREN SAM MD Jan 27, 2017 18:25

## 2017-01-27 NOTE — PDOC
PROGRESS NOTES


Subjective


Subjective


Patient has no new cardiac complaints.





Objective


Objective





 Vital Signs








  Date Time  Temp Pulse Resp B/P Pulse Ox O2 Delivery O2 Flow Rate FiO2


 


1/27/17 11:42 98.1 98 20 128/75 97 Room Air  





 98.1       














 Intake and Output 


 


 1/27/17





 07:00


 


Intake Total 1760 ml


 


Output Total 1350 ml


 


Balance 410 ml


 


 


 


Intake Oral 1760 ml


 


Output Urine Total 1350 ml











Physical Exam


Physical Exam


No significant changes in cardiac exam.





Assessment


Assessment


 Problems


Medical Problems:


(1) Vertigo


Status: Acute  











Plan


Plan of Care


Continue [facility]. Patient stable from a cardiac standpoint.





The patient wants to go home today.





I agreed with discharging the patient today.





Comment


Review of Relevant


I have reviewed the following items ophelia (where applicable) has been applied.


Labs





Laboratory Tests








Test


  1/27/17


05:50


 


Triglycerides Level


  55mg/dL


(0-150)


 


Cholesterol Level


  127mg/dL


(0-200)


 


LDL Cholesterol, Calculated


  65mg/dL


(0-100)


 


VLDL Cholesterol, Calculated 11mg/dL (0-40) 


 


HDL Cholesterol


  51mg/dL


(40-60)


 


Cholesterol/HDL Ratio 2.5 








Laboratory Tests








Test


  1/27/17


05:50


 


Triglycerides Level


  55mg/dL


(0-150)


 


Cholesterol Level


  127mg/dL


(0-200)


 


LDL Cholesterol, Calculated


  65mg/dL


(0-100)


 


VLDL Cholesterol, Calculated 11mg/dL (0-40) 


 


HDL Cholesterol


  51mg/dL


(40-60)


 


Cholesterol/HDL Ratio 2.5 








Medications





 Current Medications


Meclizine HCl (Antivert) 12.5 mg 1X  ONCE PO  Last administered on 1/25/17at 13:

32;  Start 1/25/17 at 13:15;  Stop 1/25/17 at 13:16;  Status DC


Ondansetron HCl (Zofran) 4 mg PRN Q8HRS  PRN IV NAUSEA/VOMITING;  Start 1/25/17 

at 15:30;  Stop 1/26/17 at 15:29;  Status DC


Acetaminophen (Tylenol) 650 mg PRN Q4HRS  PRN PO FEVER Last administered on 1/26 /17at 12:02;  Start 1/25/17 at 15:30;  Stop 1/26/17 at 15:29;  Status DC


Aspirin (Children'S Aspirin) 81 mg DAILYWBKFT PO  Last administered on 1/27/ 17at 09:38;  Start 1/25/17 at 19:00


Vitamin D (Vitamin D3) 5,000 unit DAILY PO  Last administered on 1/27/17at 09:38

;  Start 1/26/17 at 11:00


Calcium Carbonate/ Glycine 500 mg 500 mg BIDWMEALS PO  Last administered on 1/27 /17at 09:38;  Start 1/26/17 at 11:30


Levofloxacin/ Dextrose (LEVAQUIN 500mg PREMIX) 100 ml @  100 mls/hr Q24H IV  

Last administered on 1/26/17at 16:51;  Start 1/26/17 at 16:00





Active Scripts


Active


Reported


Aspirin 325 Mg Tablet 1 Tab PO DAILY


[none]


Vitals/I & O





 Vital Sign - Last 24 Hours








 1/26/17 1/26/17 1/26/17 1/26/17





 14:58 16:00 16:02 16:04


 


Temp 98.5   





 98.5   


 


Pulse 72 89 93 89


 


Resp 16   


 


B/P 93/57 104/54 123/62 124/59


 


Pulse Ox 96 97 96 96


 


O2 Delivery Room Air   


 


    





    





 1/26/17 1/26/17 1/26/17 1/27/17





 19:05 20:00 23:41 03:40


 


Temp 102.1  100.3 99.6





 102.1  100.3 99.6


 


Pulse 94  99 96


 


Resp 20  20 20


 


B/P 124/80  107/64 106/73


 


Pulse Ox 93  91 91


 


O2 Delivery Room Air Room Air Room Air Room Air


 


    





    





 1/27/17 1/27/17 1/27/17 





 07:36 08:00 11:42 


 


Temp 99.7  98.1 





 99.7  98.1 


 


Pulse 97  98 


 


Resp 20  20 


 


B/P 114/67  128/75 


 


Pulse Ox 96  97 


 


O2 Delivery Room Air Room Air Room Air 














 Intake and Output   


 


 1/26/17 1/26/17 1/27/17





 15:00 23:00 07:00


 


Intake Total 320 ml 1000 ml 440 ml


 


Output Total  800 ml 550 ml


 


Balance 320 ml 200 ml -110 ml














LOREN SAM MD Jan 27, 2017 13:45

## 2017-01-27 NOTE — PDOC
PROGRESS NOTES


Assessment


Assessment


Blurred vision.


Dizziness.


Light headedness.


Gait instability.


Headaches


CAD


Hx of MI s/p angioplasty


Vit D deficiency.


No evidence of acute CVA this time.





RECOMMENDATIONS/PLAN:


Meclizine 25 mg tid


Continue ASA daily.


Vit D and Ca++ supplement, further management per his PCP.


MRA or CTA if symptoms worse.


See ENT


See Dr. Fournier as outpatient for vertigo battery tests.


OT/PT


Discussed with his wife at bedside on 1/25/17.





HISTORY OF THE PRESENT ILLNESS:


71-y-old  male patient with above medical diseases developed symptoms 

of blurred vision, dizziness, light headedness, gait instability and headaches 

for about 3 days. His symptoms not resolving, but became more obvious 

especially trouble walking.





PAST MEDICAL HISTORY:


Please see above.





PAST SURGERY HISTORY: 


Angioplasty


Hernia Repair





ALLERGY:


Reviewed.





MEDICATIONS:


Refer to MAR





FAMILY HISTORY: 


Non contributory.





SOCIAL HISTORY: 


Lives at home.


Denies smoking, drinking, and illicit drug use.  


He stated he worked in [employer] for 40 years.





REVIEW OF SYSTEMS:


Constitutional: No malnutrition, weight loss, cachexia.


Head: No traumatic brain or head injury.


Skin: No edema, or rash.


Ear: No infection, tinnitus.


Eyes: No vision loss or color blindness.


Nose: No bleeding or purulent discharges.


Hearing: Hearing decrease.


Neck: No injury.


Cardiac: CAD, angioplasty


Pulmonary: No COPD.


GI: No GI ulcer, GI bleeding.


Urinary/genital: No dysuria, hematuria, incontinence, urinary retention.


Endocrinologic: No cousin face, craniofacial dysmorphism, polydactyly, goiter.


Skeletomuscular: No muscular atrophy, deformity.


Neurological: see  HP.


Psychiatric: Denies drug use/abuse.


Otherwise, not pertinant14-point review of systems.





PHYSICAL EXAMINATION:   


General appearance is in no acute distress.  


HEENT:  Normocephalic and nontraumatic.  Eyes, nose, ears, and throat are 

unremarkable.  


Neck is supple. No lymphadenopathy. No bruits are heard over the carotid 

artery.  No crepitus. 


Cardiovascular:  S1, S2, regular rate and rhythm.  


Pulmonary:  Clear to auscultation bilaterally. 


Abdomen:  Bowel sounds are positive.     


Extremities:  No rash, lesions, or edema.  


No restriction of range of motion





NEUROLOGICAL  EXAMINATION:


Alert 


Oriented to time, place and person.


PERRL.


EOMI.


CN: no focal findings.


Muscle tone: within normal.


Muscle strength: 5- to 5


DTR: 2


Plantar reflex: Flexor response bilaterally 


Gait: not examined in bed. 


Sensory exam: no abnormal findings.


No cerebellar signs elicited.


F-T-N test fine.





Objective


Objective





 Vital Signs








  Date Time  Temp Pulse Resp B/P Pulse Ox O2 Delivery O2 Flow Rate FiO2


 


1/27/17 11:42 98.1 98 20 128/75 97 Room Air  





 98.1       














 Intake and Output 


 


 1/27/17





 07:00


 


Intake Total 1760 ml


 


Output Total 1350 ml


 


Balance 410 ml


 


 


 


Intake Oral 1760 ml


 


Output Urine Total 1350 ml











Vitals Signs


Vitals





 VS - Last 72 Hours, by Label








  Date Time  Temp Pulse Resp B/P Pulse Ox O2 Delivery O2 Flow Rate FiO2


 


1/27/17 11:42 98.1 98 20 128/75 97 Room Air  





 98.1       


 


1/27/17 08:00      Room Air  


 


1/27/17 07:36 99.7 97 20 114/67 96 Room Air  





 99.7       


 


1/27/17 03:40 99.6 96 20 106/73 91 Room Air  





 99.6       


 


1/26/17 23:41 100.3 99 20 107/64 91 Room Air  





 100.3       


 


1/26/17 20:00      Room Air  


 


1/26/17 19:05 102.1 94 20 124/80 93 Room Air  





 102.1       


 


1/26/17 16:04  89  124/59 96   


 


1/26/17 16:02  93  123/62 96   


 


1/26/17 16:00  89  104/54 97   


 


1/26/17 14:58 98.5 72 16 93/57 96 Room Air  





 98.5       


 


1/26/17 10:58 101.3 89 18 128/70 93 Room Air  





 101.3       


 


1/26/17 07:30      Room Air  


 


1/26/17 07:20 101.5 94 20 122/67 95 Room Air  





 101.5       











Laboratory


Laboratory





Laboratory Tests








Test


  1/27/17


05:50


 


Triglycerides Level


  55mg/dL


(0-150)


 


Cholesterol Level


  127mg/dL


(0-200)


 


LDL Cholesterol, Calculated


  65mg/dL


(0-100)


 


VLDL Cholesterol, Calculated 11mg/dL (0-40) 


 


HDL Cholesterol


  51mg/dL


(40-60)


 


Cholesterol/HDL Ratio 2.5 











Medication


Medications





Current Medications


Levofloxacin/ Dextrose (LEVAQUIN 500mg PREMIX) 100 ml @  100 mls/hr Q24H IV  

Last administered on 1/26/17at 16:51;  Start 1/26/17 at 16:00





Comment


Review of Relevant


I have reviewed the following items ophelia (where applicable) has been applied.








ALBANIA PARKER MD Jan 27, 2017 15:45

## 2018-06-11 ENCOUNTER — HOSPITAL ENCOUNTER (INPATIENT)
Dept: HOSPITAL 61 - ER | Age: 73
LOS: 3 days | Discharge: HOME | DRG: 65 | End: 2018-06-14
Attending: INTERNAL MEDICINE | Admitting: INTERNAL MEDICINE
Payer: MEDICARE

## 2018-06-11 DIAGNOSIS — I25.10: ICD-10-CM

## 2018-06-11 DIAGNOSIS — Z87.891: ICD-10-CM

## 2018-06-11 DIAGNOSIS — I63.9: Primary | ICD-10-CM

## 2018-06-11 DIAGNOSIS — H93.19: ICD-10-CM

## 2018-06-11 DIAGNOSIS — Z98.49: ICD-10-CM

## 2018-06-11 DIAGNOSIS — K21.9: ICD-10-CM

## 2018-06-11 DIAGNOSIS — R13.10: ICD-10-CM

## 2018-06-11 DIAGNOSIS — I10: ICD-10-CM

## 2018-06-11 DIAGNOSIS — Z98.61: ICD-10-CM

## 2018-06-11 DIAGNOSIS — Z82.49: ICD-10-CM

## 2018-06-11 DIAGNOSIS — N40.0: ICD-10-CM

## 2018-06-11 DIAGNOSIS — E11.9: ICD-10-CM

## 2018-06-11 DIAGNOSIS — I25.2: ICD-10-CM

## 2018-06-11 DIAGNOSIS — E78.00: ICD-10-CM

## 2018-06-11 DIAGNOSIS — Z79.82: ICD-10-CM

## 2018-06-11 DIAGNOSIS — I69.354: ICD-10-CM

## 2018-06-11 LAB
ADD MAN DIFF?: NO
ANION GAP SERPL CALC-SCNC: 8 MMOL/L (ref 6–14)
BASO #: 0.1 X10^3/UL (ref 0–0.2)
BASO %: 1 % (ref 0–3)
BLOOD UREA NITROGEN: 16 MG/DL (ref 8–26)
CALCIUM: 9 MG/DL (ref 8.5–10.1)
CHLORIDE: 104 MMOL/L (ref 98–107)
CO2 SERPL-SCNC: 29 MMOL/L (ref 21–32)
CREAT SERPL-MCNC: 1.1 MG/DL (ref 0.7–1.3)
EOS #: 0.1 X10^3/UL (ref 0–0.7)
EOS %: 1 % (ref 0–3)
GFR SERPLBLD BASED ON 1.73 SQ M-ARVRAT: 65.6 ML/MIN
GLUCOSE SERPL-MCNC: 83 MG/DL (ref 70–99)
HCG SERPL-ACNC: 7.3 X10^3/UL (ref 4–11)
HEMATOCRIT: 39.2 % (ref 39–53)
HEMOGLOBIN: 12.9 G/DL (ref 13–17.5)
INR: 1.2 (ref 0.8–1.1)
LYMPH #: 1.4 X10^3/UL (ref 1–4.8)
LYMPH %: 19 % (ref 24–48)
MEAN CORPUSCULAR HEMOGLOBIN: 28 PG (ref 25–35)
MEAN CORPUSCULAR HGB CONC: 33 G/DL (ref 31–37)
MEAN CORPUSCULAR VOLUME: 86 FL (ref 79–100)
MONO #: 0.7 X10^3/UL (ref 0–1.1)
MONO %: 9 % (ref 0–9)
NEUT #: 5.1 X10^3UL (ref 1.8–7.7)
NEUT %: 70 % (ref 31–73)
PARTIAL THROMBOPLASTIN TIME: 27 SEC (ref 24–38)
PLATELET COUNT: 253 X10^3/UL (ref 140–400)
POC GLUCOSE: 93 MG/DL (ref 70–99)
POTASSIUM SERPL-SCNC: 3.8 MMOL/L (ref 3.5–5.1)
PROTHROMBIN TIME PATIENT: 14.2 SEC (ref 11.7–14)
RED BLOOD COUNT: 4.55 X10^6/UL (ref 4.3–5.7)
RED CELL DISTRIBUTION WIDTH: 15.5 % (ref 11.5–14.5)
SODIUM: 141 MMOL/L (ref 136–145)

## 2018-06-11 PROCEDURE — 97163 PT EVAL HIGH COMPLEX 45 MIN: CPT

## 2018-06-11 PROCEDURE — 97530 THERAPEUTIC ACTIVITIES: CPT

## 2018-06-11 PROCEDURE — 96374 THER/PROPH/DIAG INJ IV PUSH: CPT

## 2018-06-11 PROCEDURE — 85025 COMPLETE CBC W/AUTO DIFF WBC: CPT

## 2018-06-11 PROCEDURE — 80048 BASIC METABOLIC PNL TOTAL CA: CPT

## 2018-06-11 PROCEDURE — 72040 X-RAY EXAM NECK SPINE 2-3 VW: CPT

## 2018-06-11 PROCEDURE — 96361 HYDRATE IV INFUSION ADD-ON: CPT

## 2018-06-11 PROCEDURE — 85730 THROMBOPLASTIN TIME PARTIAL: CPT

## 2018-06-11 PROCEDURE — 93880 EXTRACRANIAL BILAT STUDY: CPT

## 2018-06-11 PROCEDURE — 96375 TX/PRO/DX INJ NEW DRUG ADDON: CPT

## 2018-06-11 PROCEDURE — 92610 EVALUATE SWALLOWING FUNCTION: CPT

## 2018-06-11 PROCEDURE — 82962 GLUCOSE BLOOD TEST: CPT

## 2018-06-11 PROCEDURE — 85610 PROTHROMBIN TIME: CPT

## 2018-06-11 PROCEDURE — 99285 EMERGENCY DEPT VISIT HI MDM: CPT

## 2018-06-11 PROCEDURE — 92611 MOTION FLUOROSCOPY/SWALLOW: CPT

## 2018-06-11 PROCEDURE — 70450 CT HEAD/BRAIN W/O DYE: CPT

## 2018-06-11 PROCEDURE — 36415 COLL VENOUS BLD VENIPUNCTURE: CPT

## 2018-06-11 PROCEDURE — 97535 SELF CARE MNGMENT TRAINING: CPT

## 2018-06-11 PROCEDURE — 93306 TTE W/DOPPLER COMPLETE: CPT

## 2018-06-11 PROCEDURE — 80061 LIPID PANEL: CPT

## 2018-06-11 PROCEDURE — 74230 X-RAY XM SWLNG FUNCJ C+: CPT

## 2018-06-11 PROCEDURE — 70551 MRI BRAIN STEM W/O DYE: CPT

## 2018-06-11 PROCEDURE — 96376 TX/PRO/DX INJ SAME DRUG ADON: CPT

## 2018-06-11 PROCEDURE — 93005 ELECTROCARDIOGRAM TRACING: CPT

## 2018-06-11 PROCEDURE — 97166 OT EVAL MOD COMPLEX 45 MIN: CPT

## 2018-06-11 RX ADMIN — BACITRACIN 1 MLS/HR: 5000 INJECTION, POWDER, FOR SOLUTION INTRAMUSCULAR at 21:29

## 2018-06-11 RX ADMIN — MORPHINE SULFATE 1 MG: 2 INJECTION, SOLUTION INTRAMUSCULAR; INTRAVENOUS at 11:36

## 2018-06-11 RX ADMIN — ENOXAPARIN SODIUM 1 MG: 40 INJECTION SUBCUTANEOUS at 15:27

## 2018-06-11 RX ADMIN — ONDANSETRON 1 MG: 2 INJECTION INTRAMUSCULAR; INTRAVENOUS at 11:34

## 2018-06-11 RX ADMIN — MECLIZINE 1 MG: 12.5 TABLET ORAL at 21:00

## 2018-06-11 RX ADMIN — ONDANSETRON 1 MG: 2 INJECTION INTRAMUSCULAR; INTRAVENOUS at 11:30

## 2018-06-11 RX ADMIN — BACITRACIN 1 MLS/HR: 5000 INJECTION, POWDER, FOR SOLUTION INTRAMUSCULAR at 11:17

## 2018-06-11 RX ADMIN — ASPIRIN 81 MG 1 MG: 81 TABLET ORAL at 11:30

## 2018-06-11 RX ADMIN — BACITRACIN 1 MLS/HR: 5000 INJECTION, POWDER, FOR SOLUTION INTRAMUSCULAR at 10:06

## 2018-06-11 RX ADMIN — MORPHINE SULFATE 1 MG: 2 INJECTION, SOLUTION INTRAMUSCULAR; INTRAVENOUS at 11:31

## 2018-06-11 RX ADMIN — MECLIZINE 1 MG: 12.5 TABLET ORAL at 15:27

## 2018-06-11 RX ADMIN — ASPIRIN 1 MG: 325 TABLET, DELAYED RELEASE ORAL at 12:00

## 2018-06-11 RX ADMIN — MECLIZINE 1 MG: 12.5 TABLET ORAL at 14:00

## 2018-06-12 LAB
ADD MAN DIFF?: NO
ANION GAP SERPL CALC-SCNC: 9 MMOL/L (ref 6–14)
BASO #: 0.1 X10^3/UL (ref 0–0.2)
BASO %: 1 % (ref 0–3)
BLOOD UREA NITROGEN: 16 MG/DL (ref 8–26)
CALCIUM: 7.9 MG/DL (ref 8.5–10.1)
CHLORIDE: 107 MMOL/L (ref 98–107)
CHOLESTEROL/HDL RATIO: 3.3
CHOLESTEROL: 130 MG/DL (ref 0–200)
CO2 SERPL-SCNC: 26 MMOL/L (ref 21–32)
CREAT SERPL-MCNC: 1.1 MG/DL (ref 0.7–1.3)
EOS #: 0.1 X10^3/UL (ref 0–0.7)
EOS %: 2 % (ref 0–3)
GFR SERPLBLD BASED ON 1.73 SQ M-ARVRAT: 65.6 ML/MIN
GLUCOSE SERPL-MCNC: 107 MG/DL (ref 70–99)
HCG SERPL-ACNC: 5.4 X10^3/UL (ref 4–11)
HDLC: 39 MG/DL (ref 40–60)
HEMATOCRIT: 33.3 % (ref 39–53)
HEMOGLOBIN: 11.2 G/DL (ref 13–17.5)
LDLC: 73 MG/DL (ref 0–100)
LYMPH #: 1.5 X10^3/UL (ref 1–4.8)
LYMPH %: 28 % (ref 24–48)
MEAN CORPUSCULAR HEMOGLOBIN: 29 PG (ref 25–35)
MEAN CORPUSCULAR HGB CONC: 34 G/DL (ref 31–37)
MEAN CORPUSCULAR VOLUME: 86 FL (ref 79–100)
MONO #: 0.5 X10^3/UL (ref 0–1.1)
MONO %: 9 % (ref 0–9)
NEUT #: 3.2 X10^3UL (ref 1.8–7.7)
NEUT %: 60 % (ref 31–73)
NON-HDL CHOLESTEROL: 91 MG/DL (ref 0–129)
PLATELET COUNT: 195 X10^3/UL (ref 140–400)
POTASSIUM SERPL-SCNC: 4.1 MMOL/L (ref 3.5–5.1)
RED BLOOD COUNT: 3.89 X10^6/UL (ref 4.3–5.7)
RED CELL DISTRIBUTION WIDTH: 15.6 % (ref 11.5–14.5)
SODIUM: 142 MMOL/L (ref 136–145)
TRIGLYCERIDES: 90 MG/DL (ref 0–150)
VLDLC: 18 MG/DL (ref 0–40)

## 2018-06-12 RX ADMIN — MECLIZINE 1 MG: 12.5 TABLET ORAL at 14:00

## 2018-06-12 RX ADMIN — MECLIZINE 1 MG: 12.5 TABLET ORAL at 08:35

## 2018-06-12 RX ADMIN — ASPIRIN 1 MG: 325 TABLET, DELAYED RELEASE ORAL at 08:35

## 2018-06-12 RX ADMIN — BACITRACIN 1 MLS/HR: 5000 INJECTION, POWDER, FOR SOLUTION INTRAMUSCULAR at 07:19

## 2018-06-12 RX ADMIN — ENOXAPARIN SODIUM 1 MG: 40 INJECTION SUBCUTANEOUS at 08:35

## 2018-06-12 RX ADMIN — MECLIZINE 1 MG: 12.5 TABLET ORAL at 21:00

## 2018-06-13 LAB — POC GLUCOSE: 106 MG/DL (ref 70–99)

## 2018-06-13 RX ADMIN — BARIUM SULFATE 1 GM: 0.81 POWDER, FOR SUSPENSION ORAL at 13:45

## 2018-06-13 RX ADMIN — MECLIZINE 1 MG: 12.5 TABLET ORAL at 09:00

## 2018-06-13 RX ADMIN — MECLIZINE 1 MG: 12.5 TABLET ORAL at 10:05

## 2018-06-13 RX ADMIN — ASPIRIN 1 MG: 325 TABLET, DELAYED RELEASE ORAL at 10:05

## 2018-06-13 RX ADMIN — ENOXAPARIN SODIUM 1 MG: 40 INJECTION SUBCUTANEOUS at 17:59

## 2018-06-13 RX ADMIN — MECLIZINE 1 MG: 12.5 TABLET ORAL at 12:54

## 2018-06-14 VITALS
DIASTOLIC BLOOD PRESSURE: 72 MMHG | SYSTOLIC BLOOD PRESSURE: 131 MMHG | SYSTOLIC BLOOD PRESSURE: 131 MMHG | DIASTOLIC BLOOD PRESSURE: 72 MMHG | DIASTOLIC BLOOD PRESSURE: 72 MMHG | SYSTOLIC BLOOD PRESSURE: 131 MMHG | DIASTOLIC BLOOD PRESSURE: 72 MMHG | SYSTOLIC BLOOD PRESSURE: 131 MMHG

## 2018-06-14 RX ADMIN — ENOXAPARIN SODIUM 1 MG: 40 INJECTION SUBCUTANEOUS at 12:42

## 2018-06-14 RX ADMIN — ASPIRIN 1 MG: 325 TABLET, DELAYED RELEASE ORAL at 10:39

## 2018-07-25 ENCOUNTER — HOSPITAL ENCOUNTER (OUTPATIENT)
Dept: HOSPITAL 61 - MRI | Age: 73
Discharge: HOME | End: 2018-07-25
Attending: PSYCHIATRY & NEUROLOGY
Payer: MEDICARE

## 2018-07-25 DIAGNOSIS — E55.9: ICD-10-CM

## 2018-07-25 DIAGNOSIS — M12.88: ICD-10-CM

## 2018-07-25 DIAGNOSIS — E11.9: ICD-10-CM

## 2018-07-25 DIAGNOSIS — I25.10: ICD-10-CM

## 2018-07-25 DIAGNOSIS — E78.00: ICD-10-CM

## 2018-07-25 DIAGNOSIS — K21.9: ICD-10-CM

## 2018-07-25 DIAGNOSIS — Z87.891: ICD-10-CM

## 2018-07-25 DIAGNOSIS — I10: ICD-10-CM

## 2018-07-25 DIAGNOSIS — M48.02: ICD-10-CM

## 2018-07-25 DIAGNOSIS — M25.78: ICD-10-CM

## 2018-07-25 DIAGNOSIS — I67.82: ICD-10-CM

## 2018-07-25 DIAGNOSIS — M47.892: Primary | ICD-10-CM

## 2018-07-25 LAB
BLOOD UREA NITROGEN: 20 MG/DL (ref 8–26)
CREAT SERPL-MCNC: 1.1 MG/DL (ref 0.7–1.3)
GFR SERPLBLD BASED ON 1.73 SQ M-ARVRAT: 65.6 ML/MIN

## 2018-07-25 PROCEDURE — 72141 MRI NECK SPINE W/O DYE: CPT

## 2018-07-25 PROCEDURE — 82565 ASSAY OF CREATININE: CPT

## 2018-07-25 PROCEDURE — 36415 COLL VENOUS BLD VENIPUNCTURE: CPT

## 2018-07-25 PROCEDURE — 84520 ASSAY OF UREA NITROGEN: CPT

## 2018-07-25 PROCEDURE — 70553 MRI BRAIN STEM W/O & W/DYE: CPT

## 2018-07-25 RX ADMIN — GADOBUTROL 1 MMOL: 604.72 INJECTION INTRAVENOUS at 11:49

## 2018-08-09 ENCOUNTER — HOSPITAL ENCOUNTER (OUTPATIENT)
Dept: HOSPITAL 61 - RT | Age: 73
Discharge: HOME | End: 2018-08-09
Attending: PSYCHIATRY & NEUROLOGY
Payer: MEDICARE

## 2018-08-09 DIAGNOSIS — I25.2: ICD-10-CM

## 2018-08-09 DIAGNOSIS — I10: ICD-10-CM

## 2018-08-09 DIAGNOSIS — E11.9: ICD-10-CM

## 2018-08-09 DIAGNOSIS — I25.10: ICD-10-CM

## 2018-08-09 DIAGNOSIS — Z82.49: ICD-10-CM

## 2018-08-09 DIAGNOSIS — K21.9: ICD-10-CM

## 2018-08-09 DIAGNOSIS — E55.9: ICD-10-CM

## 2018-08-09 DIAGNOSIS — Z86.73: ICD-10-CM

## 2018-08-09 DIAGNOSIS — E78.00: ICD-10-CM

## 2018-08-09 DIAGNOSIS — R56.9: Primary | ICD-10-CM

## 2018-08-09 PROCEDURE — 95816 EEG AWAKE AND DROWSY: CPT

## 2018-08-15 NOTE — EEG
DATE OF SERVICE:  08/09/2018



EEG NUMBER:  [id number]



OBJECTIVE:  This is a 72-year-old  male patient with history of seizure

or seizure-like activity.  EEG was requested to evaluate seizure activity.



METHODS:  Twenty electrodes were applied according to the international 10-20

electrode placement system.  EKG monitoring, hyperventilation, intermittent

photic stimulation, monopolar and bipolar montages are routinely utilized.  The

record was obtained on a digital system with video monitoring.



FINDINGS:



1.  Background:  The patient was recorded in the awake, drowsy, and sleep

states.  The overall background amplitude is 5-15 microvolts.  A posterior

dominant rhythm of 8 Hz is observed with superimposed fast activity in the beta

frequency.



2.  Abnormalities:  No specific epileptiform discharge or electrographic seizure

is seen.  No focal or diffuse slowing.



3.  Activation:  Hyperventilation was performed with good efforts and normal

response.  Intermittent photic stimulation was performed with photic driving. 

Photoparoxysmal response noted.  No specific epileptiform discharge or

electrographic seizure induced by hyperventilation or intermittent photic

stimulation.



IMPRESSION:  This EEG is a borderline study for the awake, drowsy and sleep

states.  There is a superimposed fast activity in the beta frequency. 

Photoparoxysmal response noted.  No focal, lateralizing, specific epileptiform

discharge or electrographic seizure is seen.

 



______________________________

ALBANIA PARKER MD



DR:  ADAMS/marybeth  JOB#:  [id number] / [id number]

DD:  08/15/2018 13:17  DT:  08/15/2018 14:00

NÉSTOR

## 2018-10-09 ENCOUNTER — HOSPITAL ENCOUNTER (OUTPATIENT)
Dept: HOSPITAL 61 - SLPLAB | Age: 73
Discharge: HOME | End: 2018-10-09
Attending: INTERNAL MEDICINE
Payer: MEDICARE

## 2018-10-09 DIAGNOSIS — Z79.899: ICD-10-CM

## 2018-10-09 DIAGNOSIS — Z79.82: ICD-10-CM

## 2018-10-09 DIAGNOSIS — F17.200: ICD-10-CM

## 2018-10-09 DIAGNOSIS — G47.33: Primary | ICD-10-CM

## 2018-10-09 DIAGNOSIS — Z72.89: ICD-10-CM

## 2018-10-09 DIAGNOSIS — K21.9: ICD-10-CM

## 2018-10-09 DIAGNOSIS — E11.9: ICD-10-CM

## 2018-10-09 PROCEDURE — 95810 POLYSOM 6/> YRS 4/> PARAM: CPT

## 2018-10-10 NOTE — SLEEP
DATE OF STUDY:  10/09/2018



ATTENDING PHYSICIAN:  Dr. Aroldo Bergeron.



REFERRING PHYSICIAN:  Dr. Jasmeet Rabago.



The patient is 73 years old who weighs 135 pounds with a BMI of 22.  The patient

had a previous sleep study at [facility] 8 years ago and was positive for MIRIAM,

but could not wear CPAP.  Another split night study was performed at [facility].



During the night study, the patient spent 431 minutes in bed and slept for 393

minutes with a sleep efficiency of 91%.  Sleep latency was 8 minutes with a REM

latency of 68 minutes.  Overall, sleep architecture showed increased stage 1 and

stage 2 sleep, normal slow wave and slightly reduced REM sleep, which was 13% of

the total sleep time.



During the initial diagnostic portion of the study, the patient slept for 158

minutes.  During that time, there were 4 obstructive apneas, no mixed or central

apneas and 86 hypopneas.  The patient's apnea hypopnea index was 34 per hour,

supine index 41 per hour and the REM index of 42 per hour.



EKG monitoring revealed normal sinus rhythm, average heart rate was 69 beats per

minute, no sustained arrhythmias were observed.



PLMS were not seen.



Nocturnal oximetry study revealed a mean oxygen saturation of 95% with the

lowest of 86%.  1.8% of time oxygen saturation remained between 80% and 89%.



The patient met the criteria for CPAP initiation.  It was started at 5 cm water

and titrated up to 7 cm water.  At the final pressure, the patient slept for 136

minutes.  The patient had supine sleep throughout and a short REM period was

observed.  The patient's AHI was reduced to 4 per hour due to few central

apneas.  The patient's oxygen saturation remained above 92%.  The patient used

small sized nasal pillows.



IMPRESSION:

1.  Severe sleep apnea-hypopnea syndrome at an AHI of 34 per hour.

2.  No clinically significant nocturnal hypoxia.

3.  No clinically significant PLMS.



RECOMMENDATIONS:

1.  CPAP at 7 cm water completely eliminated the patient's sleep apnea and

should be used on a nightly basis.

2.  Follow up in 4-6 weeks to assess compliance with CPAP and to document

clinical improvement.

3.  Avoid CNS depressants.

4.  Caution regarding driving until symptoms of sleep apnea resolve with the use

of CPAP.

 



______________________________

GLEN GARCIA MD



DR:  ANANTH/marybeth  JOB#:  [id number] / [id number]

DD:  10/10/2018 09:19  DT:  10/10/2018 09:45



JASMEET Ochoa MD, WILLIAM MD

## 2020-02-12 ENCOUNTER — HOSPITAL ENCOUNTER (OUTPATIENT)
Dept: HOSPITAL 61 - CT | Age: 75
Discharge: HOME | End: 2020-02-12
Attending: FAMILY MEDICINE
Payer: MEDICARE

## 2020-02-12 DIAGNOSIS — I67.82: Primary | ICD-10-CM

## 2020-02-12 DIAGNOSIS — I65.23: ICD-10-CM

## 2020-02-12 PROCEDURE — 70450 CT HEAD/BRAIN W/O DYE: CPT

## 2020-02-12 NOTE — RAD
CT HEAD WO CONTRAST 

 

Date: 2/12/2020 11:00 AM 

 

Clinical Indication: Headache

 

Comparison: MRI 7/25/2018. CT 6/11/2018.

 

Technique:  5 mm axial tomographic images were obtained of the head 

without contrast. These were viewed on brain and bone windows. One or more

of the following dose reduction techniques were utilized: Automated 

exposure control (AEC), Adjustment of mA and/or kV according to patient 

size, Use of iterative reconstruction technique such as ASiR, CT scan done

according to ALARA and image gently/image wisely

 

Findings:

 

Mild generalized cerebral and cerebellar volume loss. Mild nonspecific 

periventricular hypoattenuation, most commonly seen with chronic small 

vessel ischemic disease. Calcified atherosclerosis of the bilateral 

cavernous and paraclinoid internal carotid arteries and intracranial 

vertebral arteries.

 

No intra- or extra-axial mass or fluid collection. No acute hemorrhage. 

The ventricles are normal in size, shape, and morphology. The gray-white 

matter junction is normal. The subarachnoid cisterns are patent. 

 

The visualized paranasal sinuses are normal.  The visualized portions of 

the orbits and globes are normal. The mastoid air cells are clear. 

 

The  topogram shows no lytic lesion or fracture. 

 

Impression:

 

No acute intracranial process.

 

Mild cerebral volume loss. Mild chronic small vessel ischemic disease.

 

Electronically signed by: Anjel Avila MD (2/12/2020 1:41 PM) [id number]

## 2021-05-05 ENCOUNTER — HOSPITAL ENCOUNTER (EMERGENCY)
Dept: HOSPITAL 61 - ER | Age: 76
Discharge: HOME | End: 2021-05-05
Payer: MEDICARE

## 2021-05-05 VITALS — WEIGHT: 315 LBS | HEIGHT: 65 IN | BODY MASS INDEX: 52.48 KG/M2

## 2021-05-05 DIAGNOSIS — M54.40: Primary | ICD-10-CM

## 2021-05-05 DIAGNOSIS — I25.2: ICD-10-CM

## 2021-05-05 DIAGNOSIS — Z88.8: ICD-10-CM

## 2021-05-05 DIAGNOSIS — Z87.891: ICD-10-CM

## 2021-05-05 DIAGNOSIS — Z95.5: ICD-10-CM

## 2021-05-05 DIAGNOSIS — K21.9: ICD-10-CM

## 2021-05-05 DIAGNOSIS — E78.00: ICD-10-CM

## 2021-05-05 DIAGNOSIS — Z98.890: ICD-10-CM

## 2021-05-05 PROCEDURE — 99284 EMERGENCY DEPT VISIT MOD MDM: CPT

## 2021-05-05 PROCEDURE — 96372 THER/PROPH/DIAG INJ SC/IM: CPT

## 2021-05-05 PROCEDURE — 74176 CT ABD & PELVIS W/O CONTRAST: CPT

## 2021-05-05 NOTE — ED.ADGEN
Past Medical History


Past Medical History:  GERD, High Cholesterol, MI, Vascular Disease


Additional Past Medical Histor:  ENLARGED PROSTATE WITH SURGERY


Past Surgical History:  Angioplasty


Additional Past Surgical Histo:  HIATAL HERNIA


Smoking Status:  Former Smoker


Alcohol Use:  None


Drug Use:  None





General Adult


EDM:


Chief Complaint:  BACK PAIN - NO INJURY





HPI:


HPI:


Patient is a 75-year-old male who presents to the emergency room complaining of 

severe lower back pain that moves all the way through his back and into his legs

.  He states that it starts at his lower to mid back and moves all the way down.

 He states that he had an episode of this on Sunday and Monday this week.  He 

states that it went away on its own.  He states it woke him from his sleep 

today.  He has not been able to go back to sleep.  He has been trying to get 

comfortable without any success.  He denies any associated symptoms.  He denies 

any bowel or bladder difficulties.  He denies any kind of fevers.





Review of Systems:


Review of Systems:


Complete ROS is negative unless otherwise documented in HPI





Current Medications:





Current Medications








 Medications


  (Trade)  Dose


 Ordered  Sig/Joie  Start Time


 Stop Time Status Last Admin


Dose Admin


 


 Ketorolac


 Tromethamine


  (Toradol Im)  60 mg  1X  ONCE  5/5/21 08:00


 5/5/21 08:01 DC 5/5/21 08:04


60 MG


 


 Methocarbamol


  (Robaxin)  750 mg  1X  ONCE  5/5/21 08:00


 5/5/21 08:01 DC 5/5/21 08:01


750 MG











Allergies:


Allergies:





Allergies








Coded Allergies Type Severity Reaction Last Updated Verified


 


  ether Allergy Severe "N/V" 5/26/14 Yes











Physical Exam:


PE:


General: Awake, alert, NAD. Well Nourished, well hydrated. Cooperative


HEENT: Atraumatic, EOMI, PERRL, airway patent, moist oral mucosa


Neck: Supple, trachea midline


Respiratory: CTA bilaterally, normal effort, no wheezing/crackles


CV: RRR, no murmur, cap refill <2


GI: Soft, nondistended, nontender, no masses


MSK: Lower thoracic through lumbar spine tenderness on palpation, no swelling, 

no skin changes


Skin: Warm, dry, intact


Neuro: A&O x3, speech NL, sensory and motor grossly intact, no focal deficits


Psych: Normal affect, normal mood, not suicidal or homicidal





Current Patient Data:


Labs:


Complete ROS is negative unless otherwise documented in HPI


Vital Signs:





                                   Vital Signs








  Date Time  Temp Pulse Resp B/P (MAP) Pulse Ox O2 Delivery O2 Flow Rate FiO2


 


5/5/21 07:08 98.6 80 16 114/68 (83)  Room Air  





 98.6       











EKG:


EKG:


[]





Heart Score:


C/O Chest Pain:  N/A


Risk Factors:


Risk Factors:  DM, Current or recent (<one month) smoker, HTN, HLP, family 

history of CAD, obesity.


Risk Scores:


Score 0 - 3:  2.5% MACE over next 6 weeks - Discharge Home


Score 4 - 6:  20.3% MACE over next 6 weeks - Admit for Clinical Observation


Score 7 - 10:  72.7% MACE over next 6 weeks - Early Invasive Strategies





Radiology/Procedures:


Radiology/Procedures:


[]





Course & Med Decision Making:


Course & Med Decision Making


Pertinent Labs and Imaging studies reviewed. (See chart for details)





Patient is 75-year-old male presents to the emergency room complaining of new 

onset lower back pain.  Patient does have a history of multiple strokes and 

heart attacks.  Given this history there is concern that patient is a 

vasculopath and that his pain could be from a AAA.  CT abdomen pelvis will be 

ordered to rule out a AAA and to evaluate the spine.  Patient does not have any 

fever that would be suggestive of a spinal cord compression.  He does not have 

any signs of cauda equina at this time.  Patient will be given symptomatic care.

  CT is negative.  Patient is feeling significantly better after medications.  

Patient's test results and vitals while in the ED were fully reviewed and 

discussed with the patient. Patient is stable and at this time does not need 

admission to the hospital. We have discussed strict return precautions and the 

importance of following up with their Primary Care Physician. Patient stated 

understanding and was given an opportunity to ask any questions. Patient is in 

agreement with plan.





Dragon Disclaimer:


Dragon Disclaimer:


This electronic medical record was generated, in whole or in part, using a voice

 recognition dictation system.





Departure


Departure


Impression:  


   Primary Impression:  


   Sciatica


Disposition:  01 HOME / SELF CARE / HOMELESS


Condition:  STABLE


Referrals:  


UNKNOWN PCP NAME (PCP)


Patient Instructions:  Back Pain, Adult


Scripts


Methocarbamol (METHOCARBAMOL) 500 Mg Tablet


500 MG PO QID PRN for MUSCLE PAIN for 5 Days, #20 TAB


   Prov: CASSI MAIN MD         5/5/21











ACSSI MAIN MD             May 5, 2021 08:29

## 2021-05-05 NOTE — RAD
PQRS Compliance Statement:



One or more of the following individualized dose reduction techniques were utilized for this examinat
ion:  

1. Automated exposure control  

2. Adjustment of the mA and/or kV according to patient size  

3. Use of iterative reconstruction technique





CT ABDOMEN+PELVIS WO



Clinical Indication: Reason: severe back pain, 



Comparison: CTA abdomen January 31, 2011.



Technique: Helical CT imaging of the abdomen and pelvis is performed without IV or oral contrast.



Findings: 

Evaluation of solid organs and bowel is limited without oral and IV contrast, decreasing sensitivity 
for detection of pathology.



There is a 4 mm noncalcified nodule in the left lower lobe. There is a 5 mm noncalcified nodule in th
e right middle lobe, image 10. There is coronary artery disease. Cardiac size is normal. There is a m
oderate sized hiatal hernia, incompletely imaged.



Calcified granulomas in the spleen. The liver, gallbladder, pancreas, and adrenal glands are normal. 
Moderate atherosclerotic calcification of the abdominal aorta and iliac arteries. There is no aneurys
m. There is no hydronephrosis.



There is no small bowel obstruction. The appendix is normal. There is scattered stool in the colon. M
oderate descending and sigmoid colon diverticulosis. No colon wall thickening is identified. There is
 no abdominal adenopathy or free fluid.



There is probable right inguinal hernia mesh. The urinary bladder is distended, otherwise normal. No 
pelvic free fluid is seen. Prostate and seminal vesicles are normal.



There is old compression deformity at the superior endplate of T12. No significant central canal sten
osis is identified. Lumbar spine alignment is maintained.



IMPRESSION:

1.  No acute abdominal or pelvic abnormality.

2.  Moderate-sized hiatal hernia.

3.  Moderate distal colon diverticulosis.

4.  There are 2 subcentimeter noncalcified pulmonary nodules. Consider CT chest follow-up in 12 month
s if patient has risk factors for lung malignancy, otherwise no follow-up is required per Fleischner 
Society guidelines.



Electronically signed by: Felix Cooney MD (5/5/2021 9:57 AM) [id number]

## 2022-04-12 ENCOUNTER — HOSPITAL ENCOUNTER (INPATIENT)
Dept: HOSPITAL 61 - ER | Age: 77
LOS: 9 days | Discharge: HOME | DRG: 853 | End: 2022-04-21
Attending: INTERNAL MEDICINE | Admitting: INTERNAL MEDICINE
Payer: MEDICARE

## 2022-04-12 VITALS — DIASTOLIC BLOOD PRESSURE: 63 MMHG | SYSTOLIC BLOOD PRESSURE: 110 MMHG

## 2022-04-12 VITALS — HEIGHT: 66 IN | WEIGHT: 112.22 LBS | BODY MASS INDEX: 18.03 KG/M2

## 2022-04-12 DIAGNOSIS — G93.40: ICD-10-CM

## 2022-04-12 DIAGNOSIS — Z86.74: ICD-10-CM

## 2022-04-12 DIAGNOSIS — M19.90: ICD-10-CM

## 2022-04-12 DIAGNOSIS — J15.6: ICD-10-CM

## 2022-04-12 DIAGNOSIS — Z79.02: ICD-10-CM

## 2022-04-12 DIAGNOSIS — Z20.822: ICD-10-CM

## 2022-04-12 DIAGNOSIS — Z86.73: ICD-10-CM

## 2022-04-12 DIAGNOSIS — J96.21: ICD-10-CM

## 2022-04-12 DIAGNOSIS — E78.00: ICD-10-CM

## 2022-04-12 DIAGNOSIS — I50.43: ICD-10-CM

## 2022-04-12 DIAGNOSIS — M94.0: ICD-10-CM

## 2022-04-12 DIAGNOSIS — I47.2: ICD-10-CM

## 2022-04-12 DIAGNOSIS — K21.9: ICD-10-CM

## 2022-04-12 DIAGNOSIS — R57.0: ICD-10-CM

## 2022-04-12 DIAGNOSIS — E78.5: ICD-10-CM

## 2022-04-12 DIAGNOSIS — Z95.5: ICD-10-CM

## 2022-04-12 DIAGNOSIS — N40.0: ICD-10-CM

## 2022-04-12 DIAGNOSIS — I48.0: ICD-10-CM

## 2022-04-12 DIAGNOSIS — I25.5: ICD-10-CM

## 2022-04-12 DIAGNOSIS — I25.10: ICD-10-CM

## 2022-04-12 DIAGNOSIS — N17.9: ICD-10-CM

## 2022-04-12 DIAGNOSIS — I11.0: ICD-10-CM

## 2022-04-12 DIAGNOSIS — Z82.49: ICD-10-CM

## 2022-04-12 DIAGNOSIS — I21.4: ICD-10-CM

## 2022-04-12 DIAGNOSIS — Z87.891: ICD-10-CM

## 2022-04-12 DIAGNOSIS — A41.9: Primary | ICD-10-CM

## 2022-04-12 DIAGNOSIS — J44.0: ICD-10-CM

## 2022-04-12 DIAGNOSIS — F43.10: ICD-10-CM

## 2022-04-12 DIAGNOSIS — F41.9: ICD-10-CM

## 2022-04-12 DIAGNOSIS — J98.11: ICD-10-CM

## 2022-04-12 DIAGNOSIS — Z79.01: ICD-10-CM

## 2022-04-12 DIAGNOSIS — E11.51: ICD-10-CM

## 2022-04-12 LAB
ALBUMIN SERPL-MCNC: 3.5 G/DL (ref 3.4–5)
ALBUMIN/GLOB SERPL: 1.1 {RATIO} (ref 1–1.7)
ALP SERPL-CCNC: 110 U/L (ref 46–116)
ALT SERPL-CCNC: 19 U/L (ref 16–63)
ANION GAP SERPL CALC-SCNC: 11 MMOL/L (ref 6–14)
APTT BLD: 31 SEC (ref 24–38)
AST SERPL-CCNC: 14 U/L (ref 15–37)
BASOPHILS # BLD AUTO: 0.1 X10^3/UL (ref 0–0.2)
BASOPHILS NFR BLD: 1 % (ref 0–3)
BILIRUB SERPL-MCNC: 0.3 MG/DL (ref 0.2–1)
BUN SERPL-MCNC: 22 MG/DL (ref 8–26)
BUN/CREAT SERPL: 17 (ref 6–20)
CALCIUM SERPL-MCNC: 8.6 MG/DL (ref 8.5–10.1)
CHLORIDE SERPL-SCNC: 104 MMOL/L (ref 98–107)
CO2 SERPL-SCNC: 26 MMOL/L (ref 21–32)
CREAT SERPL-MCNC: 1.3 MG/DL (ref 0.7–1.3)
EOSINOPHIL NFR BLD: 0.1 X10^3/UL (ref 0–0.7)
EOSINOPHIL NFR BLD: 1 % (ref 0–3)
ERYTHROCYTE [DISTWIDTH] IN BLOOD BY AUTOMATED COUNT: 16 % (ref 11.5–14.5)
GFR SERPLBLD BASED ON 1.73 SQ M-ARVRAT: 53.5 ML/MIN
GLUCOSE SERPL-MCNC: 139 MG/DL (ref 70–99)
HCT VFR BLD CALC: 29.2 % (ref 39–53)
HGB BLD-MCNC: 9.7 G/DL (ref 13–17.5)
LYMPHOCYTES # BLD: 1.1 X10^3/UL (ref 1–4.8)
LYMPHOCYTES NFR BLD AUTO: 10 % (ref 24–48)
MAGNESIUM SERPL-MCNC: 2 MG/DL (ref 1.8–2.4)
MCH RBC QN AUTO: 28 PG (ref 25–35)
MCHC RBC AUTO-ENTMCNC: 33 G/DL (ref 31–37)
MCV RBC AUTO: 83 FL (ref 79–100)
MONO #: 1 X10^3/UL (ref 0–1.1)
MONOCYTES NFR BLD: 9 % (ref 0–9)
NEUT #: 8.4 X10^3/UL (ref 1.8–7.7)
NEUTROPHILS NFR BLD AUTO: 79 % (ref 31–73)
PLATELET # BLD AUTO: 274 X10^3/UL (ref 140–400)
POTASSIUM SERPL-SCNC: 3.9 MMOL/L (ref 3.5–5.1)
PROT SERPL-MCNC: 6.8 G/DL (ref 6.4–8.2)
PROTHROMBIN TIME: 15.9 SEC (ref 11.7–14)
RBC # BLD AUTO: 3.52 X10^6/UL (ref 4.3–5.7)
SODIUM SERPL-SCNC: 141 MMOL/L (ref 136–145)
WBC # BLD AUTO: 10.6 X10^3/UL (ref 4–11)

## 2022-04-12 PROCEDURE — 82805 BLOOD GASES W/O2 SATURATION: CPT

## 2022-04-12 PROCEDURE — 83735 ASSAY OF MAGNESIUM: CPT

## 2022-04-12 PROCEDURE — C1894 INTRO/SHEATH, NON-LASER: HCPCS

## 2022-04-12 PROCEDURE — C1874 STENT, COATED/COV W/DEL SYS: HCPCS

## 2022-04-12 PROCEDURE — 83880 ASSAY OF NATRIURETIC PEPTIDE: CPT

## 2022-04-12 PROCEDURE — 33210 INSERT ELECTRD/PM CATH SNGL: CPT

## 2022-04-12 PROCEDURE — 87075 CULTR BACTERIA EXCEPT BLOOD: CPT

## 2022-04-12 PROCEDURE — 80053 COMPREHEN METABOLIC PANEL: CPT

## 2022-04-12 PROCEDURE — 36556 INSERT NON-TUNNEL CV CATH: CPT

## 2022-04-12 PROCEDURE — 85027 COMPLETE CBC AUTOMATED: CPT

## 2022-04-12 PROCEDURE — 36600 WITHDRAWAL OF ARTERIAL BLOOD: CPT

## 2022-04-12 PROCEDURE — 94760 N-INVAS EAR/PLS OXIMETRY 1: CPT

## 2022-04-12 PROCEDURE — 94660 CPAP INITIATION&MGMT: CPT

## 2022-04-12 PROCEDURE — 85610 PROTHROMBIN TIME: CPT

## 2022-04-12 PROCEDURE — 84484 ASSAY OF TROPONIN QUANT: CPT

## 2022-04-12 PROCEDURE — 84100 ASSAY OF PHOSPHORUS: CPT

## 2022-04-12 PROCEDURE — 99152 MOD SED SAME PHYS/QHP 5/>YRS: CPT

## 2022-04-12 PROCEDURE — 94640 AIRWAY INHALATION TREATMENT: CPT

## 2022-04-12 PROCEDURE — 84145 PROCALCITONIN (PCT): CPT

## 2022-04-12 PROCEDURE — 33967 INSERT I-AORT PERCUT DEVICE: CPT

## 2022-04-12 PROCEDURE — C1892 INTRO/SHEATH,FIXED,PEEL-AWAY: HCPCS

## 2022-04-12 PROCEDURE — 82962 GLUCOSE BLOOD TEST: CPT

## 2022-04-12 PROCEDURE — G0378 HOSPITAL OBSERVATION PER HR: HCPCS

## 2022-04-12 PROCEDURE — 36415 COLL VENOUS BLD VENIPUNCTURE: CPT

## 2022-04-12 PROCEDURE — 87428 SARSCOV & INF VIR A&B AG IA: CPT

## 2022-04-12 PROCEDURE — 83615 LACTATE (LD) (LDH) ENZYME: CPT

## 2022-04-12 PROCEDURE — 85730 THROMBOPLASTIN TIME PARTIAL: CPT

## 2022-04-12 PROCEDURE — 76937 US GUIDE VASCULAR ACCESS: CPT

## 2022-04-12 PROCEDURE — 99153 MOD SED SAME PHYS/QHP EA: CPT

## 2022-04-12 PROCEDURE — 93458 L HRT ARTERY/VENTRICLE ANGIO: CPT

## 2022-04-12 PROCEDURE — 70450 CT HEAD/BRAIN W/O DYE: CPT

## 2022-04-12 PROCEDURE — 71045 X-RAY EXAM CHEST 1 VIEW: CPT

## 2022-04-12 PROCEDURE — 80048 BASIC METABOLIC PNL TOTAL CA: CPT

## 2022-04-12 PROCEDURE — 32555 ASPIRATE PLEURA W/ IMAGING: CPT

## 2022-04-12 PROCEDURE — 93005 ELECTROCARDIOGRAM TRACING: CPT

## 2022-04-12 PROCEDURE — 33968 REMOVE AORTIC ASSIST DEVICE: CPT

## 2022-04-12 PROCEDURE — 83605 ASSAY OF LACTIC ACID: CPT

## 2022-04-12 PROCEDURE — 92933 PRQ TRLML C ATHRC ST ANGIOP1: CPT

## 2022-04-12 PROCEDURE — 84157 ASSAY OF PROTEIN OTHER: CPT

## 2022-04-12 PROCEDURE — 85025 COMPLETE CBC W/AUTO DIFF WBC: CPT

## 2022-04-12 PROCEDURE — 71275 CT ANGIOGRAPHY CHEST: CPT

## 2022-04-12 PROCEDURE — 92928 PRQ TCAT PLMT NTRAC ST 1 LES: CPT

## 2022-04-12 NOTE — RAD
XR CHEST 1V



Clinical Indication: Reason: SOA / Spl. Instructions:  / History: 



Comparison:  AP chest 2/4/2022.



Findings: Cardiac size is stable.

There is hiatal hernia. There are bilateral perihilar airspace and interstitial opacities that are wo
rsened on the prior study. There are bibasilar airspace opacities. There is pulmonary vascular indist
inctness. There is no pneumothorax. There are moderate bilateral pleural effusions, similar to prior 
study. No acute bone abnormality.



IMPRESSION: 

1.  There are bilateral perihilar and basilar interstitial and alveolar opacities that may be pulmona
ry edema or pneumonia.

2.  Moderate bilateral pleural effusions.





Electronically signed by: Felix Cooney MD (4/12/2022 8:07 PM) [facility]

## 2022-04-12 NOTE — NUR
pt c/o chest pain dr bush notified new orders received, will cont to monitor pt status 
and safety.pnrn

## 2022-04-12 NOTE — RAD
Exam: CT of chest with contrast



INDICATION: Short of air



TECHNIQUE: Sequential axial images through the chest obtained following the administration of 75 mL o
f Isovue-370 IV contrast. Sagittal and coronal reformatted images were reconstructed from the axial d
monet and reviewed.



Exposure: One or more of the following in the visualized dose reduction techniques were utilized for 
this examination:

1.  Automated exposure control

2.  Adjustment of the MA and/or KV according to patient size

3.  Use of iterative of reconstructive technique



Comparisons: Chest x-ray same day



FINDINGS:

Visualized portions of the thyroid are unremarkable. Prominent and mildly enlarged bilateral hilar ly
mph nodes are noted.



Heart size is normal. No pericardial effusion. Mild coronary artery calcification. Thoracic aorta has
 normal course caliber. Pulmonary artery is not enlarged. No pulmonary embolus identified within the 
main, lobar or segmental pulmonary arteries.



Airways are patent. Patchy consolidative changes noted at the lungs bilaterally. No pneumothorax.



Moderate bilateral pleural effusions.



Visualized upper abdomen is unremarkable.



No suspicious osseous lesions or acute fractures.



IMPRESSION:

1.  No pulmonary embolus identified within the main, lobar or segmental pulmonary arteries.

2.  Moderate bilateral pleural effusions.

3.  Patchy consolidative changes lungs bilaterally favored to be infectious or inflammatory in etiolo
gy. This appears improved in the upper lobes but increased in the lower lobes compared to study in Fe
bruary.





Electronically signed by: Rosendo Pierre MD (4/12/2022 8:56 PM) [email or web address]

## 2022-04-12 NOTE — EKG
[facility]

              [address] [place] [address]

Test Date:    2022               Test Time:    18:34:00

Pat Name:     EVERETTE MONTEMAYOR        Department:   

Patient ID:   [id number]           Room:          

Gender:       M                        Technician:   

:          1945               Requested By: PAULETTE GALVAN

Order Number: [id number]           Reading MD:   Yakov De La Rosa

                                 Measurements

Intervals                              Axis          

Rate:         113                      P:            26

MA:           166                      QRS:          -22

QRSD:         100                      T:            88

QT:           332                                    

QTc:          455                                    

                           Interpretive Statements

SINUS TACHYCARDIA

LEFTWARD AXIS

LOW LIMB LEAD VOLTAGE

T ABNORMALITY IN HIGH LATERAL LEADS

ABNORMAL ECG

Electronically Signed On 4- 13:39:02 CDT by Yakov De La Rosa

## 2022-04-12 NOTE — EKG
[facility]

              [address] [place] [address]

Test Date:    2022               Test Time:    22:45:01

Pat Name:     EVERETTE MONTEMAYOR        Department:   

Patient ID:   [id number]           Room:         1 1

Gender:       M                        Technician:   SETH

:          1945               Requested By: PAULETTE GALVAN

Order Number: [id number]           Reading MD:   Yakov De La Rosa

                                 Measurements

Intervals                              Axis          

Rate:         118                      P:            6

TN:           158                      QRS:          -26

QRSD:         98                       T:            95

QT:           328                                    

QTc:          462                                    

                           Interpretive Statements

SINUS TACHYCARDIA

COMPLEX(ES) WITH ABERRANT INTRAVENTRICULAR CONDUCTION

ATRIAL PREMATURE COMPLEX(ES)

LEFTWARD AXIS

LOW LIMB LEAD VOLTAGE

QRS(T) CONTOUR ABNORMALITY

CONSIDER ANTEROSEPTAL MYOCARDIAL DAMAGE

T ABNORMALITY IN LATERAL LEADS

Electronically Signed On 4- 13:37:38 CDT by Yakov De La Rosa

## 2022-04-12 NOTE — PHYS DOC
Past Medical History


Past Medical History:  CAD, CHF, COPD, CVA, GERD, High Cholesterol, Hypert

ension, MI, Vascular Disease


Additional Past Medical Histor:  SUDDEN CARDIAC DEATH


Past Surgical History:  Other


Additional Past Surgical Histo:  HERNIA, CATARACTS


Smoking Status:  Former Smoker


Alcohol Use:  None


Drug Use:  None





General Adult


EDM:


Chief Complaint:  SHORTNESS OF BREATH





HPI:


HPI:





Patient is a 76-year-old male who presents today with shortness of air and chest

pain.  When I asked patient how long symptoms have been ongoing he said since 

the last time he was admitted to the hospital he has been having increased 

shortness of breath.  After reviewing medical records from [facility] he was admitted in February 2022 with A. fib RVR and congestive heart 

failure, patient did bring comprehensive list of his medications with him he is 

on Eliquis on a daily basis for his atrial fib.  Patient [facility].  Patient's room 

air sat while I was in the room was 92% but he is quite tachypneic and has to 

stop frequently during the question-and-answer session to catch his breath.





Review of Systems:


Review of Systems:


Constitutional:   Denies fever or chills. []


Eyes:   Denies change in visual acuity. []


HENT:   Denies nasal congestion or sore throat. [] 


Respiratory:   Shortness of breath denies cougH  [] 


Cardiovascular:   chest pain, denies edema. [] 


GI:   Denies abdominal pain, nausea, vomiting, bloody stools or diarrhea. [] 


:  Denies dysuria. [] 


Musculoskeletal:   Denies back pain or joint pain. [] 


Integument:   Denies rash. [] 


Neurologic:   Denies headache, focal weakness or sensory changes. [] 


Endocrine:   Denies polyuria or polydipsia. [] 


Lymphatic:  Denies swollen glands. [] 


Psychiatric:  Denies depression or anxiety. []





Heart Score:


C/O Chest Pain:  Yes


HEART Score for Chest Pain:  








HEART Score for Chest Pain Response (Comments) Value


 


History Highly Suspicious 2


 


ECG Normal 0


 


Age > 65 2


 


Risk Factors >3 Risk Factors or Hx CAD 2


 


Troponin < Normal Limit 0


 


Total  6








Risk Factors:


Risk Factors:  DM, Current or recent (<one month) smoker, HTN, HLP, family 

history of CAD, obesity.


Risk Scores:


Score 0 - 3:  2.5% MACE over next 6 weeks - Discharge Home


Score 4 - 6:  20.3% MACE over next 6 weeks - Admit for Clinical Observation


Score 7 - 10:  72.7% MACE over next 6 weeks - Early Invasive Strategies





Current Medications:


Home medications


Albuterol nebulizer every 6 hours as needed


Flomax 0.4 mg 2 times daily


Albuterol inhaler every 4 hours as needed 2 puffs


Eliquis 5 mg twice daily


Aspirin 325 mg daily


Atorvastatin 40 mg at bedtime


Flonase nasal spray


Lasix 40 mg daily


Metoprolol half a tablet daily


Prilosec 20 mg daily


Spiriva daily


Lisinopril daily





Allergies:


Allergies:





Allergies








Coded Allergies Type Severity Reaction Last Updated Verified


 


  ether Adverse Reaction Severe "N/V" 2/4/22 Yes











Physical Exam:


PE:





Constitutional: Well developed, well nourished, moderate distress, non-toxic 

appearance. []


HENT: Normocephalic, atraumatic, bilateral external ears normal, oropharynx 

moist, no oral exudates, nose normal. []


Eyes: PERRLA, EOMI, conjunctiva normal, no discharge. [] 


Neck: Normal range of motion, no tenderness, supple,


Cardiovascular:Heart rate irregular rhythm, 1+ pedal pulses


Lungs & Thorax:  Bilateral breath sounds diminished bilaterally, increased work 

of breathing


Abdomen: Bowel sounds normal, soft, no tenderness, no masses, no pulsatile 

masses. [] 


Skin: Warm, dry, no erythema, no rash. [] 


Back: No tenderness, no CVA tenderness. [] 


Extremities: No tenderness, no cyanosis, no clubbing, ROM intact, no pedal edema


Neurologic: Alert and oriented X 3, normal motor function, normal sensory 

function, no focal deficits noted. []


Psychologic: Affect normal, judgement normal, mood normal. []





Current Patient Data:


Labs:





Laboratory Tests








Test


 4/12/22


18:39


 


White Blood Count 10.6 x10^3/uL 


 


Red Blood Count 3.52 x10^6/uL 


 


Hemoglobin 9.7 g/dL 


 


Hematocrit 29.2 % 


 


Mean Corpuscular Volume 83 fL 


 


Mean Corpuscular Hemoglobin 28 pg 


 


Mean Corpuscular Hemoglobin


Concent 33 g/dL 





 


Red Cell Distribution Width 16.0 % 


 


Platelet Count 274 x10^3/uL 


 


Neutrophils (%) (Auto) 79 % 


 


Lymphocytes (%) (Auto) 10 % 


 


Monocytes (%) (Auto) 9 % 


 


Eosinophils (%) (Auto) 1 % 


 


Basophils (%) (Auto) 1 % 


 


Neutrophils # (Auto) 8.4 x10^3/uL 


 


Lymphocytes # (Auto) 1.1 x10^3/uL 


 


Monocytes # (Auto) 1.0 x10^3/uL 


 


Eosinophils # (Auto) 0.1 x10^3/uL 


 


Basophils # (Auto) 0.1 x10^3/uL 


 


Prothrombin Time 15.9 SEC 


 


Prothromb Time International


Ratio 1.3 





 


Activated Partial


Thromboplast Time 31 SEC 





 


Sodium Level 141 mmol/L 


 


Potassium Level 3.9 mmol/L 


 


Chloride Level 104 mmol/L 


 


Carbon Dioxide Level 26 mmol/L 


 


Anion Gap 11 


 


Blood Urea Nitrogen 22 mg/dL 


 


Creatinine 1.3 mg/dL 


 


Estimated GFR


(Cockcroft-Gault) 53.5 





 


BUN/Creatinine Ratio 17 


 


Glucose Level 139 mg/dL 


 


Calcium Level 8.6 mg/dL 


 


Magnesium Level 2.0 mg/dL 


 


Total Bilirubin 0.3 mg/dL 


 


Aspartate Amino Transf


(AST/SGOT) 14 U/L 





 


Alanine Aminotransferase


(ALT/SGPT) 19 U/L 





 


Alkaline Phosphatase 110 U/L 


 


Troponin I High Sensitivity 9 ng/L 


 


NT-Pro-B-Type Natriuretic


Peptide 2784 pg/mL 





 


Total Protein 6.8 g/dL 


 


Albumin 3.5 g/dL 


 


Albumin/Globulin Ratio 1.1 








Current Medications








 Medications


  (Trade)  Dose


 Ordered  Sig/Joie


 Route


 PRN Reason  Start Time


 Stop Time Status Last Admin


Dose Admin


 


 Aspirin


  (Aspirin


 Chewable)  324 mg  1X  ONCE


 PO


   4/12/22 18:15


 4/12/22 18:16 DC 4/12/22 18:30





 


 Albuterol/


 Ipratropium


  (Duoneb)  3 ml  1X  ONCE


 NEB


   4/12/22 19:45


 4/12/22 19:46 DC 4/12/22 20:08





 


 Iohexol


  (Omnipaque 350


 Mg/ml)  75 ml  1X  ONCE


 IV


   4/12/22 19:45


 4/12/22 19:46 DC 4/12/22 19:45











Vital Signs:





Vital Signs








  Date Time  Temp Pulse Resp B/P (MAP) Pulse Ox O2 Delivery O2 Flow Rate FiO2


 


4/12/22 20:09     97 Nasal Cannula 2.0 


 


4/12/22 20:01  109 24 104/58 (73) 98 Nasal Cannula 4.0 


 


4/12/22 19:31  113 24 112/68 (83) 95 Nasal Cannula 4.0 


 


4/12/22 19:01  108 24 116/69 (85) 99 Nasal Cannula 4.0 


 


4/12/22 18:31  116 24 125/71 (89) 95 Nasal Cannula 4.0 


 


4/12/22 17:53 98.2 113 22 124/84 (97) 90 Room Air  





 98.2       








Vital Signs








  Date Time  Temp Pulse Resp B/P (MAP) Pulse Ox O2 Delivery O2 Flow Rate FiO2


 


4/12/22 17:53 98.2 113 22 124/84 (97) 90 Room Air  





 98.2       











EKG:


EKG:


EKG done at 1800 read by Dr. Jaquez at 1805 showed sinus tachycardia with PACs and 

PVCs noted, AR interval of 168 ms with a QTC of 456 ms at a rate of 113 no STEMI

 []





Radiology/Procedures:


Radiology/Procedures:


REASON: SOA


PROCEDURE: PORTABLE CHEST 1V





XR CHEST 1V





Clinical Indication: Reason: SOA / Spl. Instructions:  / History: 





Comparison:  AP chest 2/4/2022.





Findings: Cardiac size is stable.


There is hiatal hernia. There are bilateral perihilar airspace and interstitial 

opacities that are worsened on the prior study. There are bibasilar airspace 

opacities. There is pulmonary vascular indistinctness. There is no pneumothorax.

 There are moderate bilateral pleural effusions, similar to prior study. No 

acute bone abnormality.





IMPRESSION: 


1.  There are bilateral perihilar and basilar interstitial and alveolar 

opacities that may be pulmonary edema or pneumonia.


2.  Moderate bilateral pleural effusions.








Electronically signed by: Felix Cooney MD (4/12/2022 8:07 PM) [facility]





Course & Med Decision Making:


Course & Med Decision Making


Pertinent Labs and Imaging studies reviewed. (See chart for details)





2100 spoke to Dr. Patel and consulted with him regarding the patient's 

laboratory and radiological findings and he is agreeable to admitting the 

patient for CHF exacerbation and bilateral pleural effusions and shortness of 

air.  I did speak to patient he is agreeable to being admitted as well.





Dragon Disclaimer:


Dragon Disclaimer:


This electronic medical record was generated, in whole or in part, using a voice

 recognition dictation system.





Departure


Departure


Impression:  


   Primary Impression:  


   Shortness of breath


   Additional Impressions:  


   Congestive heart failure (CHF)


   


   Bilateral pleural effusion


Disposition:  09 ADMITTED AS INPATIENT


Admitting Physician:  HIMS


Condition:  GUARDED


Referrals:  


UNKNOWN PCP NAME (PCP)











PAULETTE GALVAN       Apr 12, 2022 18:15

## 2022-04-13 VITALS — DIASTOLIC BLOOD PRESSURE: 57 MMHG | SYSTOLIC BLOOD PRESSURE: 100 MMHG

## 2022-04-13 VITALS — DIASTOLIC BLOOD PRESSURE: 50 MMHG | SYSTOLIC BLOOD PRESSURE: 91 MMHG

## 2022-04-13 VITALS — DIASTOLIC BLOOD PRESSURE: 59 MMHG | SYSTOLIC BLOOD PRESSURE: 97 MMHG

## 2022-04-13 VITALS — SYSTOLIC BLOOD PRESSURE: 114 MMHG | DIASTOLIC BLOOD PRESSURE: 59 MMHG

## 2022-04-13 VITALS — DIASTOLIC BLOOD PRESSURE: 62 MMHG | SYSTOLIC BLOOD PRESSURE: 103 MMHG

## 2022-04-13 VITALS — DIASTOLIC BLOOD PRESSURE: 63 MMHG | SYSTOLIC BLOOD PRESSURE: 107 MMHG

## 2022-04-13 LAB
INFLUENZA A PATIENT: NEGATIVE
INFLUENZA B PATIENT: NEGATIVE

## 2022-04-13 RX ADMIN — DICLOFENAC SODIUM SCH APP: 10 GEL TOPICAL at 11:00

## 2022-04-13 RX ADMIN — TAMSULOSIN HYDROCHLORIDE SCH MG: 0.4 CAPSULE ORAL at 21:52

## 2022-04-13 RX ADMIN — MORPHINE SULFATE PRN MG: 2 INJECTION, SOLUTION INTRAMUSCULAR; INTRAVENOUS at 07:22

## 2022-04-13 RX ADMIN — ATORVASTATIN CALCIUM SCH MG: 20 TABLET, FILM COATED ORAL at 21:50

## 2022-04-13 RX ADMIN — DICLOFENAC SODIUM SCH APP: 10 GEL TOPICAL at 21:00

## 2022-04-13 RX ADMIN — METOPROLOL SUCCINATE SCH MG: 25 TABLET, EXTENDED RELEASE ORAL at 13:11

## 2022-04-13 RX ADMIN — MORPHINE SULFATE PRN MG: 2 INJECTION, SOLUTION INTRAMUSCULAR; INTRAVENOUS at 00:07

## 2022-04-13 RX ADMIN — FLUTICASONE PROPIONATE SCH SPRAY: 50 SPRAY, METERED NASAL at 21:50

## 2022-04-13 RX ADMIN — DICLOFENAC SODIUM SCH APP: 10 GEL TOPICAL at 23:01

## 2022-04-13 RX ADMIN — MORPHINE SULFATE PRN MG: 2 INJECTION, SOLUTION INTRAMUSCULAR; INTRAVENOUS at 11:27

## 2022-04-13 RX ADMIN — MORPHINE SULFATE PRN MG: 2 INJECTION, SOLUTION INTRAMUSCULAR; INTRAVENOUS at 13:11

## 2022-04-13 RX ADMIN — METOPROLOL SUCCINATE SCH MG: 25 TABLET, EXTENDED RELEASE ORAL at 11:27

## 2022-04-13 NOTE — PDOC1
History and Physical


Date of Service:


DOS:


DATE: 4/13/22 


TIME: 08:42





Chief Complaint:


Chief Complain:


Shortness of breath





History of Present Illness:


HPI:


76-year-old male with multiple comorbidities who comes in with shortness of 

breath and chest pain.  Shortness of breath has been going on for for few 

days.Patient's room air sat while I was in the room was 92% but he is quite 

tachypneic and has to stop frequently during the question-and-answer session to 

catch his breath.  Denies any fevers, abdominal pain, diarrhea, dysuria or 

syncope.





Past Medical/Surgical History:


PMH/PSH:


Past Medical History: Diabetes mellitus type 2, MIRIAM on CPAP, CAD, CHF, COPD, 

CVA, GERD, High Cholesterol, Hypertension, MI, Vascular Disease, sudden cardiac 

arrest


Past Surgical History:   HERNIA, CATARACTS





Allergies:


Allergies:  


Coded Allergies:  


     ether (Verified  Adverse Reaction, Severe, "N/V", 2/4/22)





Family History:


Family History:


Reviewed and no relative findings in the chart





Social History:


Social History:


Smoking Status:  Former Smoker


Alcohol Use:  None


Drug Use:  None





Current Medications:


Current Medications





Current Medications


Aspirin (Aspirin Chewable) 324 mg 1X  ONCE PO  Last administered on 4/12/22at 

18:30;  Start 4/12/22 at 18:15;  Stop 4/12/22 at 18:16;  Status DC


Albuterol/ Ipratropium (Duoneb) 3 ml 1X  ONCE NEB  Last administered on 

4/12/22at 20:08;  Start 4/12/22 at 19:45;  Stop 4/12/22 at 19:46;  Status DC


Iohexol (Omnipaque 350 Mg/ml) 75 ml 1X  ONCE IV  Last administered on 4/12/22at 

19:45;  Start 4/12/22 at 19:45;  Stop 4/12/22 at 19:46;  Status DC


Acetaminophen (Tylenol) 650 mg PRN Q6HRS  PRN PO MILD PAIN / TEMP > 100.3'F;  

Start 4/12/22 at 21:15


Ondansetron HCl (Zofran) 4 mg PRN Q4HRS  PRN IVP NAUSEA/VOMITING 1st choice;  

Start 4/12/22 at 21:15


Guaifenesin (Robitussin Dm) 10 ml PRN Q6HRS  PRN PO COUGH;  Start 4/12/22 at 

21:15


Furosemide (Lasix) 40 mg 1X  ONCE IVP  Last administered on 4/12/22at 21:30;  

Start 4/12/22 at 21:30;  Stop 4/12/22 at 21:31;  Status DC


Morphine Sulfate (Morphine Sulfate) 2 mg PRN Q6HRS  PRN IVP SEVERE PAIN 7-10 

Last administered on 4/13/22at 07:22;  Start 4/13/22 at 00:00





Active Scripts


Active


Furosemide 40 Mg Tablet 1 Tab PO DAILY 30 Days


Metoprolol Succinate ( Xl ) (Metoprolol Succinate) 25 Mg Tab.er.24h 25 Mg PO 

DAILY 30 Days


Eliquis (Apixaban) 5 Mg Tablet 5 Mg PO BID 30 Days


Reported


Refresh Lacri-Lube Ointment (Mineral Oil/Petrolatum,White) 3.5 Gm Oint...g. 3.5 

Gm OU DAILY


Atorvastatin Calcium 20 Mg Tablet 2 Tab PO HS


Omeprazole 20 Mg Capsule.dr 1 Cap PO DAILY


Lubricating Plus (Carboxymethylcellulose Sodium) 1 Each Droperette 1 OU DAILY


Fluticasone Propionate Nasal Spray (Fluticasone Propionate) 16 Gm Spray.susp 1 

Sprays NS BID


Proair Hfa Inhaler (Albuterol Sulfate) 8.5 Gm Hfa.aer.ad 1 Puff INH PRN Q6HRS 

PRN


Aspirin 325 Mg Tablet 1 Tab PO DAILY





ROS:


Review of Systems


Review of System


REVIEW OF SYSTEMS:


GENERAL:  Denies weakness


SKIN:  No bruising, hair changes or rashes.


EYES:  No blurred, double or loss of vision.


NOSE AND THROAT:  No history of nosebleeds, hoarseness or sore throat.


HEART:  No history of palpitations, chest pain or shortness of breath on


exertion.


LUNGS: Positive for shortness of breath


GASTROINTESTINAL:  Denies changes in appetite, nausea, vomiting, diarrhea or


constipation.


GENITOURINARY:  No history of frequency, urgency, hesitancy or nocturia.


NEUROLOGIC:  Denies history of numbness, tingling, or tremor.


PSYCHIATRIC:  No history of panic, anxiety or depression.


ENDOCRINE:  No history of heat or cold intolerance, polyuria or polydipsia.


EXTREMITIES:  Denies joint pain, pain on walking or stiffness.





Physical Exam:


Vital Signs:





Vital Signs








  Date Time  Temp Pulse Resp B/P (MAP) Pulse Ox O2 Delivery O2 Flow Rate FiO2


 


4/13/22 07:52     96 Nasal Cannula 2.0 


 


4/13/22 07:00 98.7 112 17 114/59 (77)    





 98.7       








Physcial Exam:


General: Well developed, well nourished, no acute distress, well appearing


HEENT:  Pupils equally round and reactive to light, EOMI, no discharge, normal 

conjunctiva


Neck:  Supple, no nuchal rigidity, no JVD, trachea midline, no tenderness


Cardiac:  RRR, no murmurs, no gallops, no rubs


Chest/Lungs:  CTAB, no wheeze, no rhonchi, no crackles


Abdomen: soft, non-distended, no guarding, no peritoneal signs, non-tender 


Back:  No tenderness


Extremities:  no edema, pulses intact, non-tender,capillary refill <3 sec 

bilateral upper and lower extremities,


Neuro:  Alert and oriented x 4, no focal deficits, normal speech





Labs:


Labs:





Laboratory Tests








Test


 4/12/22


18:39 4/12/22


20:53 4/13/22


01:15


 


White Blood Count


 10.6 x10^3/uL


(4.0-11.0) 


 





 


Red Blood Count


 3.52 x10^6/uL


(4.30-5.70) 


 





 


Hemoglobin


 9.7 g/dL


(13.0-17.5) 


 





 


Hematocrit


 29.2 %


(39.0-53.0) 


 





 


Mean Corpuscular Volume 83 fL ()   


 


Mean Corpuscular Hemoglobin 28 pg (25-35)   


 


Mean Corpuscular Hemoglobin


Concent 33 g/dL


(31-37) 


 





 


Red Cell Distribution Width


 16.0 %


(11.5-14.5) 


 





 


Platelet Count


 274 x10^3/uL


(140-400) 


 





 


Neutrophils (%) (Auto) 79 % (31-73)   


 


Lymphocytes (%) (Auto) 10 % (24-48)   


 


Monocytes (%) (Auto) 9 % (0-9)   


 


Eosinophils (%) (Auto) 1 % (0-3)   


 


Basophils (%) (Auto) 1 % (0-3)   


 


Neutrophils # (Auto)


 8.4 x10^3/uL


(1.8-7.7) 


 





 


Lymphocytes # (Auto)


 1.1 x10^3/uL


(1.0-4.8) 


 





 


Monocytes # (Auto)


 1.0 x10^3/uL


(0.0-1.1) 


 





 


Eosinophils # (Auto)


 0.1 x10^3/uL


(0.0-0.7) 


 





 


Basophils # (Auto)


 0.1 x10^3/uL


(0.0-0.2) 


 





 


Prothrombin Time


 15.9 SEC


(11.7-14.0) 


 





 


Prothromb Time International


Ratio 1.3 (0.8-1.1) 


 


 





 


Activated Partial


Thromboplast Time 31 SEC (24-38) 


 


 





 


Sodium Level


 141 mmol/L


(136-145) 


 





 


Potassium Level


 3.9 mmol/L


(3.5-5.1) 


 





 


Chloride Level


 104 mmol/L


() 


 





 


Carbon Dioxide Level


 26 mmol/L


(21-32) 


 





 


Anion Gap 11 (6-14)   


 


Blood Urea Nitrogen


 22 mg/dL


(8-26) 


 





 


Creatinine


 1.3 mg/dL


(0.7-1.3) 


 





 


Estimated GFR


(Cockcroft-Gault) 53.5 


 


 





 


BUN/Creatinine Ratio 17 (6-20)   


 


Glucose Level


 139 mg/dL


(70-99) 


 





 


Calcium Level


 8.6 mg/dL


(8.5-10.1) 


 





 


Magnesium Level


 2.0 mg/dL


(1.8-2.4) 


 





 


Total Bilirubin


 0.3 mg/dL


(0.2-1.0) 


 





 


Aspartate Amino Transf


(AST/SGOT) 14 U/L (15-37) 


 


 





 


Alanine Aminotransferase


(ALT/SGPT) 19 U/L (16-63) 


 


 





 


Alkaline Phosphatase


 110 U/L


() 


 





 


Troponin I High Sensitivity 9 ng/L (4-75)  10 ng/L (4-75)  15 ng/L (4-75) 


 


NT-Pro-B-Type Natriuretic


Peptide 2784 pg/mL


(0-449) 


 





 


Total Protein


 6.8 g/dL


(6.4-8.2) 


 





 


Albumin


 3.5 g/dL


(3.4-5.0) 


 





 


Albumin/Globulin Ratio 1.1 (1.0-1.7)   








Laboratory Tests








Test


 4/12/22


18:39 4/12/22


20:53 4/13/22


01:15


 


White Blood Count


 10.6 x10^3/uL


(4.0-11.0) 


 





 


Red Blood Count


 3.52 x10^6/uL


(4.30-5.70) 


 





 


Hemoglobin


 9.7 g/dL


(13.0-17.5) 


 





 


Hematocrit


 29.2 %


(39.0-53.0) 


 





 


Mean Corpuscular Volume 83 fL ()   


 


Mean Corpuscular Hemoglobin 28 pg (25-35)   


 


Mean Corpuscular Hemoglobin


Concent 33 g/dL


(31-37) 


 





 


Red Cell Distribution Width


 16.0 %


(11.5-14.5) 


 





 


Platelet Count


 274 x10^3/uL


(140-400) 


 





 


Neutrophils (%) (Auto) 79 % (31-73)   


 


Lymphocytes (%) (Auto) 10 % (24-48)   


 


Monocytes (%) (Auto) 9 % (0-9)   


 


Eosinophils (%) (Auto) 1 % (0-3)   


 


Basophils (%) (Auto) 1 % (0-3)   


 


Neutrophils # (Auto)


 8.4 x10^3/uL


(1.8-7.7) 


 





 


Lymphocytes # (Auto)


 1.1 x10^3/uL


(1.0-4.8) 


 





 


Monocytes # (Auto)


 1.0 x10^3/uL


(0.0-1.1) 


 





 


Eosinophils # (Auto)


 0.1 x10^3/uL


(0.0-0.7) 


 





 


Basophils # (Auto)


 0.1 x10^3/uL


(0.0-0.2) 


 





 


Prothrombin Time


 15.9 SEC


(11.7-14.0) 


 





 


Prothromb Time International


Ratio 1.3 (0.8-1.1) 


 


 





 


Activated Partial


Thromboplast Time 31 SEC (24-38) 


 


 





 


Sodium Level


 141 mmol/L


(136-145) 


 





 


Potassium Level


 3.9 mmol/L


(3.5-5.1) 


 





 


Chloride Level


 104 mmol/L


() 


 





 


Carbon Dioxide Level


 26 mmol/L


(21-32) 


 





 


Anion Gap 11 (6-14)   


 


Blood Urea Nitrogen


 22 mg/dL


(8-26) 


 





 


Creatinine


 1.3 mg/dL


(0.7-1.3) 


 





 


Estimated GFR


(Cockcroft-Gault) 53.5 


 


 





 


BUN/Creatinine Ratio 17 (6-20)   


 


Glucose Level


 139 mg/dL


(70-99) 


 





 


Calcium Level


 8.6 mg/dL


(8.5-10.1) 


 





 


Magnesium Level


 2.0 mg/dL


(1.8-2.4) 


 





 


Total Bilirubin


 0.3 mg/dL


(0.2-1.0) 


 





 


Aspartate Amino Transf


(AST/SGOT) 14 U/L (15-37) 


 


 





 


Alanine Aminotransferase


(ALT/SGPT) 19 U/L (16-63) 


 


 





 


Alkaline Phosphatase


 110 U/L


() 


 





 


Troponin I High Sensitivity 9 ng/L (4-75)  10 ng/L (4-75)  15 ng/L (4-75) 


 


NT-Pro-B-Type Natriuretic


Peptide 2784 pg/mL


(0-449) 


 





 


Total Protein


 6.8 g/dL


(6.4-8.2) 


 





 


Albumin


 3.5 g/dL


(3.4-5.0) 


 





 


Albumin/Globulin Ratio 1.1 (1.0-1.7)   











Images:


Images


PROCEDURE: PORTABLE CHEST 1V





XR CHEST 1V





Clinical Indication: Reason: SOA / Spl. Instructions:  / History: 





Comparison:  AP chest 2/4/2022.





Findings: Cardiac size is stable.


There is hiatal hernia. There are bilateral perihilar airspace and interstitial 

opacities that are worsened on the prior study. There are bibasilar airspace 

opacities. There is pulmonary vascular indistinctness. There is no pneumothorax.

There are moderate bilateral pleural effusions, similar to prior study. No acute

bone abnormality.





IMPRESSION: 


1.  There are bilateral perihilar and basilar interstitial and alveolar 

opacities that may be pulmonary edema or pneumonia.


2.  Moderate bilateral pleural effusions.








PROCEDURE: CT ANGIOGRAPHY CHEST





Exam: CT of chest with contrast





INDICATION: Short of air





TECHNIQUE: Sequential axial images through the chest obtained following the 

administration of 75 mL of Isovue-370 IV contrast. Sagittal and coronal 

reformatted images were reconstructed from the axial data and reviewed.





Exposure: One or more of the following in the visualized dose reduction 

techniques were utilized for this examination:


1.  Automated exposure control


2.  Adjustment of the MA and/or KV according to patient size


3.  Use of iterative of reconstructive technique





Comparisons: Chest x-ray same day





FINDINGS:


Visualized portions of the thyroid are unremarkable. Prominent and mildly enl

arged bilateral hilar lymph nodes are noted.





Heart size is normal. No pericardial effusion. Mild coronary artery 

calcification. Thoracic aorta has normal course caliber. Pulmonary artery is not

enlarged. No pulmonary embolus identified within the main, lobar or segmental 

pulmonary arteries.





Airways are patent. Patchy consolidative changes noted at the lungs bilaterally.

No pneumothorax.





Moderate bilateral pleural effusions.





Visualized upper abdomen is unremarkable.





No suspicious osseous lesions or acute fractures.





IMPRESSION:


1.  No pulmonary embolus identified within the main, lobar or segmental 

pulmonary arteries.


2.  Moderate bilateral pleural effusions.


3.  Patchy consolidative changes lungs bilaterally favored to be infectious or 

inflammatory in etiology. This appears improved in the upper lobes but increased

in the lower lobes compared to study in February.





Assessment/Plan


Assessment/Plan


Acute hypoxic respiratory failure


Bilateral pleural effusions


Acute on chronic CHF exacerbation


Costochondritis


History of CHF with LVEF of 15 to 20% on echo 2/7/2022


History of CAD


History of MIRIAM


History of dyslipidemia


History of hypertension


History of atrial fibrillation on Eliquis








Admit to hospitalist service for further management


Cardiology consult for CHF management


Pulmonology consult for possible thoracentesis


Hold anticoagulation for now patient's chart, labs, images were reviewed and 

discussed with RN


Fluid and sodium restriction


Lovenox for DVT prophylaxis


Protonix GI prophylaxis


ADA diet


CODE STATUS full


Discussed with RN and SW


Disposition inpatient management as above


DPOA: Brisa Cabrera





Justifications for Admission


Other Justification














HILDA BROWN MD                  Apr 13, 2022 08:50

## 2022-04-13 NOTE — EKG
[facility]

              [address] [place] [address]

Test Date:    2022               Test Time:    10:58:58

Pat Name:     EVERETTE MONTEMAYOR        Department:   

Patient ID:   [id number]           Room:         [facility]

Gender:       M                        Technician:   MEGHAN

:          1945               Requested By: NELSON NGUYEN

Order Number: [id number]           Reading MD:   Yakov De La Rosa

                                 Measurements

Intervals                              Axis          

Rate:         115                      P:            47

ME:           160                      QRS:          -20

QRSD:         102                      T:            71

QT:           330                                    

QTc:          458                                    

                           Interpretive Statements

SINUS TACHYCARDIA

LEFTWARD AXIS

LOW LIMB LEAD VOLTAGE

Electronically Signed On 4- 13:32:54 CDT by Yakov De La Rosa

## 2022-04-13 NOTE — NUR
SS following for discharge planning. SS reviewed pt chart and discussed with pt RN. Pt is 
from home with spouse and is currently requiring oxygen at two liters nasal canula. COVID19 
negative. Pt has no home oxygen. Thoracentesis tomorrow. SS will continue to follow for 
discharge planning.

## 2022-04-13 NOTE — CONS
DATE OF CONSULTATION: 04/13/2022

ATTENDING PHYSICIAN:  Shalom Stevenson MD



REASON FOR CONSULTATION:  Respiratory failure.



HISTORY OF PRESENT ILLNESS:  The patient is a 76-year-old male with a history of

cardiomyopathy with an ejection fraction of 15-20%.  He is not on home oxygen.  

He has 30-40 years of tobacco use.  He also used to drink alcohol as well, but 

has stayed away from it for the past 25 years.



He was brought into the hospital with increasing shortness of breath.  He denies

any chest pain.  He said he uses nebulizer 6 times without any improvement.  No 

leg edema.  No chest pain, no cough, no fever, no chills.



The patient underwent evaluation in the Emergency Room and a CTA chest was 

performed, which was reviewed by me.  There is moderate bilateral pleural 

effusions.  They have been present since February films and slightly increased 

on the left side.  There is some compressive atelectasis of the lower lobes.  

Findings are all suggestive of congestive heart failure.  The upper lobe 

infiltrates have improved since February.  He is currently on oxygen at 2 

liters.



PAST MEDICAL HISTORY:  History of atrial fibrillation, severe cardiomyopathy 

with an EF of 15-20%, history of CHF, hypertension, MI and history of cardiac 

arrest.  History of COPD, GERD, BPH, and diabetes.



PAST SURGICAL HISTORY:  Cataracts and hernia repair.



FAMILY HISTORY:  Dyslipidemia.



SOCIAL HISTORY:  Smoked for 30-40 years before quitting 30 years ago.



REVIEW OF SYSTEMS:  A 12-point review of system obtained.  Pertinent positives 

discussed in my present illness, otherwise noncontributory.  All systems that 

were negative were reviewed as well.



MEDICATIONS:  All reviewed as listed in the MRAD including Lovenox as well as 

supportive meds.



PHYSICAL EXAMINATION:

VITAL SIGNS:  Reviewed.  T-max of 100.1.  Blood pressure is 114/59, afebrile 

today, pulse ox 96% on 2 liters.

NECK:  Supple.

LUNGS:  With diminished breath sounds bilaterally.

CARDIOVASCULAR:  With a regular rate.

ABDOMEN:  Soft, obese.

EXTREMITIES:  With no pitting edema.



LABORATORY DATA:  Reviewed.  White cell count 10.6, hemoglobin 9.7 and platelets

are 274.  BUN 22 and creatinine of 1.3.



IMPRESSION:

1.  Acute hypoxic respiratory failure secondary to acute on chronic systolic 

heart failure with bilateral pleural effusions.

2.  Low-grade fever.  Needs to rule out influenza and COVID.

3.  Abnormal CT chest with bilateral pleural effusion, which has been persistent

and moderate.  The patient would benefit from thoracentesis.  The effusion may 

have increased slightly since February CT chest.

4.  Underlying chronic obstructive pulmonary disease with 30 years of 

tobaccoism.



RECOMMENDATIONS:

1.  Discussed with the patient that at this time, I would recommend doing 

thoracentesis, he is agreeable to that.  Currently, Eliquis will be held today 

and Lovenox will be held as well.  We will schedule his procedure tomorrow.

2.  Continue present oxygen.

3.  Rule out influenza and COVID.

4.  Monitor fever curve.

5.  Diuresis.

6.  Cardiology recommendations.

7.  Discussed with RN and Cardiology APRN.







BILLIE

DR: Krysta   DD: 04/13/2022 10:57

DT: 04/13/2022 11:08   TID: [id number]

## 2022-04-13 NOTE — PDOC2
DILLON MARTIN APRN 4/13/22 1004:


CARDIAC CONSULT


DATE OF CONSULT


Date of Consult


DATE: 4/13/22 


TIME: 09:51





REASON FOR CONSULT


Reason for Consult:


Acute CHF





REFERRING PHYSICIAN


Referring Physician:


Dr. Stevenson





SOURCE


Source:  Chart review, Patient





HISTORY OF PRESENT ILLNESS


HISTORY OF PRESENT ILLNESS


This is a 77 yo male, with a history of severe CMP, who presented secondary to 

increasing shortness of breath and chest pain. Reports chronic dyspnea. Has 

progressively worsened. Reports intermittent pressure in his central chest and 

pain across his shoulder blades for which he attributes to arthritis. He denies 

any palpitations, dizziness, diaphoresis, or palpitations. Was seen by our 

service in February for acute on chronic systolic CHF. Was also admitted to the 

[facility] in early March for same. Reports primary cardiologist, Dr. Ovalle is planning AICD implantation in the near future.





PAST MEDICAL HISTORY


Past Medical History


Cardiovascular:  AFIB, CAD, CHF, HTN, MI, Hyperlipidemia, Other (Possible 

cardiac arrest)


Pulmonary:  COPD


GI:  GERD


Musculoskeletal:   low back pain, arthritis 


Renal/:  Benign prostatic enlarg.


Endocrine:  Diabetes





PAST SURGICAL HISTORY


Past Surgical History


Cataract Removal, Hernia Repair, Other (Coronary stenting)





FAMILY HISTORY


Family History


Heart Disease, High Cholesterol, Hypertension





SOCIAL HISTORY


Smoke:  Quit


ALCOHOL:  none


Drugs:  None


Lives:  with Family





CURRENT MEDICATIONS


CURRENT MEDICATIONS





Current Medications








 Medications


  (Trade)  Dose


 Ordered  Sig/Joie


 Route


 PRN Reason  Start Time


 Stop Time Status Last Admin


Dose Admin


 


 Aspirin


  (Aspirin


 Chewable)  324 mg  1X  ONCE


 PO


   4/12/22 18:15


 4/12/22 18:16 DC 4/12/22 18:30





 


 Albuterol/


 Ipratropium


  (Duoneb)  3 ml  1X  ONCE


 NEB


   4/12/22 19:45


 4/12/22 19:46 DC 4/12/22 20:08





 


 Iohexol


  (Omnipaque 350


 Mg/ml)  75 ml  1X  ONCE


 IV


   4/12/22 19:45


 4/12/22 19:46 DC 4/12/22 19:45





 


 Furosemide


  (Lasix)  40 mg  1X  ONCE


 IVP


   4/12/22 21:30


 4/12/22 21:31 DC 4/12/22 21:30





 


 Morphine Sulfate


  (Morphine


 Sulfate)  2 mg  PRN Q6HRS  PRN


 IVP


 SEVERE PAIN 7-10  4/13/22 00:00


    4/13/22 07:22














ALLERGIES


ALLERGIES:  


Coded Allergies:  


     ether (Verified  Adverse Reaction, Severe, "N/V", 2/4/22)





ROS


Review of System


14 point ROS conducted with pertinent positives noted above in HPI





PHYSICAL EXAM


General:  Alert, Oriented X3, Cooperative, mild distress


HEENT:  Atraumatic


Lungs:  Other (diminished bases, upper crackles )


Heart:  Other (sinus tachycardia )


Abdomen:  Soft, No tenderness


Extremities:  No edema


Skin:  No significant lesion


Neuro:  Normal speech, Sensation intact


Psych/Mental Status:  Mental status NL, Mood NL


MUSCULOSKELETAL:  Osteoarthritic changes both hands





VITALS/I&O


VITALS/I&O:





                                   Vital Signs








  Date Time  Temp Pulse Resp B/P (MAP) Pulse Ox O2 Delivery O2 Flow Rate FiO2


 


4/13/22 07:52     96 Nasal Cannula 2.0 


 


4/13/22 07:00 98.7 112 17 114/59 (77)    





 98.7       














                                    I & O   


 


 4/12/22 4/12/22 4/13/22





 15:00 23:00 07:00


 


Intake Total   200 ml


 


Output Total   700 ml


 


Balance   -500 ml











LABS


Lab:





                                Laboratory Tests








Test


 4/12/22


18:39 4/12/22


20:53 4/13/22


01:15


 


White Blood Count


 10.6 x10^3/uL


(4.0-11.0) 


 





 


Red Blood Count


 3.52 x10^6/uL


(4.30-5.70)  L 


 





 


Hemoglobin


 9.7 g/dL


(13.0-17.5)  L 


 





 


Hematocrit


 29.2 %


(39.0-53.0)  L 


 





 


Mean Corpuscular Volume


 83 fL ()


 


 





 


Mean Corpuscular Hemoglobin 28 pg (25-35)    


 


Mean Corpuscular Hemoglobin


Concent 33 g/dL


(31-37) 


 





 


Red Cell Distribution Width


 16.0 %


(11.5-14.5)  H 


 





 


Platelet Count


 274 x10^3/uL


(140-400) 


 





 


Neutrophils (%) (Auto) 79 % (31-73)  H  


 


Lymphocytes (%) (Auto) 10 % (24-48)  L  


 


Monocytes (%) (Auto) 9 % (0-9)    


 


Eosinophils (%) (Auto) 1 % (0-3)    


 


Basophils (%) (Auto) 1 % (0-3)    


 


Neutrophils # (Auto)


 8.4 x10^3/uL


(1.8-7.7)  H 


 





 


Lymphocytes # (Auto)


 1.1 x10^3/uL


(1.0-4.8) 


 





 


Monocytes # (Auto)


 1.0 x10^3/uL


(0.0-1.1) 


 





 


Eosinophils # (Auto)


 0.1 x10^3/uL


(0.0-0.7) 


 





 


Basophils # (Auto)


 0.1 x10^3/uL


(0.0-0.2) 


 





 


Prothrombin Time


 15.9 SEC


(11.7-14.0)  H 


 





 


Prothrombin Time INR


 1.3 (0.8-1.1)


H 


 





 


Activated Partial


Thromboplast Time 31 SEC (24-38)


 


 





 


Sodium Level


 141 mmol/L


(136-145) 


 





 


Potassium Level


 3.9 mmol/L


(3.5-5.1) 


 





 


Chloride Level


 104 mmol/L


() 


 





 


Carbon Dioxide Level


 26 mmol/L


(21-32) 


 





 


Anion Gap 11 (6-14)    


 


Blood Urea Nitrogen


 22 mg/dL


(8-26) 


 





 


Creatinine


 1.3 mg/dL


(0.7-1.3) 


 





 


Estimated GFR


(Cockcroft-Gault) 53.5  


 


 





 


BUN/Creatinine Ratio 17 (6-20)    


 


Glucose Level


 139 mg/dL


(70-99)  H 


 





 


Calcium Level


 8.6 mg/dL


(8.5-10.1) 


 





 


Magnesium Level


 2.0 mg/dL


(1.8-2.4) 


 





 


Total Bilirubin


 0.3 mg/dL


(0.2-1.0) 


 





 


Aspartate Amino Transferase


(AST) 14 U/L (15-37)


L 


 





 


Alanine Aminotransferase (ALT)


 19 U/L (16-63)


 


 





 


Alkaline Phosphatase


 110 U/L


() 


 





 


Troponin I High Sensitivity


 9 ng/L (4-75)  


 10 ng/L (4-75)


 15 ng/L (4-75)





 


NT-Pro-B-Type Natriuretic


Peptide 2784 pg/mL


(0-449)  H 


 





 


Total Protein


 6.8 g/dL


(6.4-8.2) 


 





 


Albumin


 3.5 g/dL


(3.4-5.0) 


 





 


Albumin/Globulin Ratio 1.1 (1.0-1.7)    





                                Laboratory Tests


4/12/22 18:39








                                Laboratory Tests


4/12/22 18:39











ECHOCARDIOGRAM


ECHOCARDIOGRAM





<Conclusion>


The Left Ventricle is borderline dilated.


The ejection fraction is severely impaired.  


LV ejection fraction is 15-20%.


There is severe global hypokinesis of the left ventricle.


Doppler and Color Flow revealed no significant aortic regurgitation.


There is no significant aortic valvular stenosis.


Doppler and Color-flow revealed mild mitral regurgitation.


Doppler and Color Flow revealed trace to mild tricuspid regurgitation.  

Estimated PAP 42 mmHg.


The aortic root is mildly enlarged.





DATE:     02/07/22 [id number]





ASSESSMENT/PLAN


ASSESSMENT/PLAN


1.  Acute on chronic respiratory failure with a/c CHF and bilateral pleural 

effusion. ? PNA 


2.  Chest pain, mixed features; AMI ruled out.


3.  Acute on chronic systolic CHF; s/p IV Lasix 


4.  Cardiomyopathy; Echo with severe LV dysfunction with EF 15-20%. [facility]le. Outpatient AICD reportedly planned  


5.  PAFIB with RVR; on metoprolol for rate control and Eliquis for stroke 

prophylaxis.


6.  CAD s/p PCI 2008. No recent ischemic evaluation reported 


7.  Hypertension; low end


8.  Hyperlipidemia; statin 


9.  Diabetes, II


10.  Peripheral vascular disease.  Patient denies claudication symptoms.


11.  H/o CVA


12.  Fevers 





Recommendations





Diuresis with monitoring of renal function


Resume metoprolol for rate control 


Hold Eliquis for possible thoracentesis 


HF optimization 


No ACEi/ARB for now with low end blood pressure


Secondary prevention


ASA/statin therapy


Ongoing pulmonary optimization


Discussed need for ischemic evaluation given history/risk factors, presentation,

and recurrent CHF. Patient would like to consider through primary cardiologist, 

Dr. Ovalle


Supportive care





JOHANNA HERBERT MD 4/13/22 4916:


CARDIAC CONSULT


ASSESSMENT/PLAN


ASSESSMENT/PLAN


Patient seen and examined.  Agree with NP's assessment and plan


Continue diuresis for ac on chr systolic HF


AF with RVR - agree with metoprolol for rate control  - eliquis on hold for 

possible thoracentesis


CP with atypical features - CAD clinically stable


Consider ischemic evaluation as outpatient


PAD stable


Thank you for your consultation











DILLON MARTIN           Apr 13, 2022 10:04


JOHANNA HERBERT MD           Apr 13, 2022 21:48

## 2022-04-14 VITALS — SYSTOLIC BLOOD PRESSURE: 97 MMHG | DIASTOLIC BLOOD PRESSURE: 60 MMHG

## 2022-04-14 VITALS — DIASTOLIC BLOOD PRESSURE: 48 MMHG | SYSTOLIC BLOOD PRESSURE: 66 MMHG

## 2022-04-14 VITALS — SYSTOLIC BLOOD PRESSURE: 97 MMHG | DIASTOLIC BLOOD PRESSURE: 59 MMHG

## 2022-04-14 VITALS — SYSTOLIC BLOOD PRESSURE: 71 MMHG | DIASTOLIC BLOOD PRESSURE: 48 MMHG

## 2022-04-14 VITALS — SYSTOLIC BLOOD PRESSURE: 67 MMHG | DIASTOLIC BLOOD PRESSURE: 50 MMHG

## 2022-04-14 VITALS — SYSTOLIC BLOOD PRESSURE: 80 MMHG | DIASTOLIC BLOOD PRESSURE: 53 MMHG

## 2022-04-14 VITALS — DIASTOLIC BLOOD PRESSURE: 45 MMHG | SYSTOLIC BLOOD PRESSURE: 65 MMHG

## 2022-04-14 VITALS — DIASTOLIC BLOOD PRESSURE: 58 MMHG | SYSTOLIC BLOOD PRESSURE: 101 MMHG

## 2022-04-14 VITALS — SYSTOLIC BLOOD PRESSURE: 66 MMHG | DIASTOLIC BLOOD PRESSURE: 47 MMHG

## 2022-04-14 VITALS — SYSTOLIC BLOOD PRESSURE: 66 MMHG | DIASTOLIC BLOOD PRESSURE: 50 MMHG

## 2022-04-14 VITALS — DIASTOLIC BLOOD PRESSURE: 52 MMHG | SYSTOLIC BLOOD PRESSURE: 93 MMHG

## 2022-04-14 VITALS — SYSTOLIC BLOOD PRESSURE: 63 MMHG | DIASTOLIC BLOOD PRESSURE: 47 MMHG

## 2022-04-14 VITALS — DIASTOLIC BLOOD PRESSURE: 56 MMHG | SYSTOLIC BLOOD PRESSURE: 96 MMHG

## 2022-04-14 VITALS — SYSTOLIC BLOOD PRESSURE: 66 MMHG | DIASTOLIC BLOOD PRESSURE: 51 MMHG

## 2022-04-14 VITALS — DIASTOLIC BLOOD PRESSURE: 53 MMHG | SYSTOLIC BLOOD PRESSURE: 87 MMHG

## 2022-04-14 VITALS — SYSTOLIC BLOOD PRESSURE: 99 MMHG | DIASTOLIC BLOOD PRESSURE: 58 MMHG

## 2022-04-14 LAB
ANION GAP SERPL CALC-SCNC: 8 MMOL/L (ref 6–14)
ANION GAP SERPL CALC-SCNC: 8 MMOL/L (ref 6–14)
BASOPHILS # BLD AUTO: 0.1 X10^3/UL (ref 0–0.2)
BASOPHILS NFR BLD: 1 % (ref 0–3)
BUN SERPL-MCNC: 29 MG/DL (ref 8–26)
BUN SERPL-MCNC: 36 MG/DL (ref 8–26)
CALCIUM SERPL-MCNC: 8.8 MG/DL (ref 8.5–10.1)
CALCIUM SERPL-MCNC: 8.8 MG/DL (ref 8.5–10.1)
CHLORIDE SERPL-SCNC: 100 MMOL/L (ref 98–107)
CHLORIDE SERPL-SCNC: 102 MMOL/L (ref 98–107)
CO2 SERPL-SCNC: 27 MMOL/L (ref 21–32)
CO2 SERPL-SCNC: 29 MMOL/L (ref 21–32)
CREAT SERPL-MCNC: 1.5 MG/DL (ref 0.7–1.3)
CREAT SERPL-MCNC: 1.7 MG/DL (ref 0.7–1.3)
EOSINOPHIL NFR BLD: 0.1 X10^3/UL (ref 0–0.7)
EOSINOPHIL NFR BLD: 1 % (ref 0–3)
ERYTHROCYTE [DISTWIDTH] IN BLOOD BY AUTOMATED COUNT: 15.9 % (ref 11.5–14.5)
GFR SERPLBLD BASED ON 1.73 SQ M-ARVRAT: 39.3 ML/MIN
GFR SERPLBLD BASED ON 1.73 SQ M-ARVRAT: 45.4 ML/MIN
GLUCOSE SERPL-MCNC: 120 MG/DL (ref 70–99)
GLUCOSE SERPL-MCNC: 190 MG/DL (ref 70–99)
HCT VFR BLD CALC: 26.1 % (ref 39–53)
HGB BLD-MCNC: 8.5 G/DL (ref 13–17.5)
LYMPHOCYTES # BLD: 1.3 X10^3/UL (ref 1–4.8)
LYMPHOCYTES NFR BLD AUTO: 12 % (ref 24–48)
MAGNESIUM SERPL-MCNC: 2.1 MG/DL (ref 1.8–2.4)
MCH RBC QN AUTO: 27 PG (ref 25–35)
MCHC RBC AUTO-ENTMCNC: 33 G/DL (ref 31–37)
MCV RBC AUTO: 83 FL (ref 79–100)
MONO #: 1.3 X10^3/UL (ref 0–1.1)
MONOCYTES NFR BLD: 12 % (ref 0–9)
NEUT #: 8 X10^3/UL (ref 1.8–7.7)
NEUTROPHILS NFR BLD AUTO: 75 % (ref 31–73)
PHOSPHATE SERPL-MCNC: 3.7 MG/DL (ref 2.6–4.7)
PLATELET # BLD AUTO: 235 X10^3/UL (ref 140–400)
POTASSIUM SERPL-SCNC: 3.7 MMOL/L (ref 3.5–5.1)
POTASSIUM SERPL-SCNC: 3.9 MMOL/L (ref 3.5–5.1)
RBC # BLD AUTO: 3.14 X10^6/UL (ref 4.3–5.7)
SODIUM SERPL-SCNC: 135 MMOL/L (ref 136–145)
SODIUM SERPL-SCNC: 139 MMOL/L (ref 136–145)
WBC # BLD AUTO: 10.7 X10^3/UL (ref 4–11)

## 2022-04-14 PROCEDURE — 0W9B3ZZ DRAINAGE OF LEFT PLEURAL CAVITY, PERCUTANEOUS APPROACH: ICD-10-PCS | Performed by: RADIOLOGY

## 2022-04-14 RX ADMIN — FLUTICASONE PROPIONATE SCH SPRAY: 50 SPRAY, METERED NASAL at 09:00

## 2022-04-14 RX ADMIN — ATORVASTATIN CALCIUM SCH MG: 20 TABLET, FILM COATED ORAL at 20:15

## 2022-04-14 RX ADMIN — Medication SCH CAP: at 20:15

## 2022-04-14 RX ADMIN — FLUTICASONE PROPIONATE SCH SPRAY: 50 SPRAY, METERED NASAL at 20:16

## 2022-04-14 RX ADMIN — TAMSULOSIN HYDROCHLORIDE SCH MG: 0.4 CAPSULE ORAL at 08:22

## 2022-04-14 RX ADMIN — DICLOFENAC SODIUM SCH APP: 10 GEL TOPICAL at 20:21

## 2022-04-14 RX ADMIN — METOPROLOL SUCCINATE SCH MG: 25 TABLET, EXTENDED RELEASE ORAL at 08:23

## 2022-04-14 RX ADMIN — FUROSEMIDE SCH MG: 40 TABLET ORAL at 16:25

## 2022-04-14 RX ADMIN — TAMSULOSIN HYDROCHLORIDE SCH MG: 0.4 CAPSULE ORAL at 20:15

## 2022-04-14 RX ADMIN — DICLOFENAC SODIUM SCH APP: 10 GEL TOPICAL at 09:00

## 2022-04-14 NOTE — RAD
Bilateral Thoracentesis 4/14/2022 9:42 AM



Clinical History: Bilateral pleural effusion.



Technique:  Relative benefits risks and alternatives were discussed with the patient and/or their rep
resentative.  Written informed consent was obtained. The patient was placed in seated position. A shaniqua
eout procedure was performed. 



Sonographic assessment demonstrates a large pleural effusion. A site for skin entry was selected, and
 subsequently prepped and draped using sterile barrier technique.  1% lidocaine without epinepherine 
was administered for local anesthesia to the skin and subcutaenous tissues. 



A 5 Dominican sheathed needle was passed into the right pleural space.  Fluid was aspirated and the cath
eter was connected to a vacuum. Approximately 800 cc of fluid were drained. The catheter was removed 
and adequate hemostasis was obtained. This process was performed and essentially identical fashion on
 the contralateral side and again 800 cc of fluid was removed. A sterile dressings were applied. The 
patient tolerated the procedure well, without complications.



Impression: Bilateral thoracentesis, ultrasound-guided. 800 cc removed from each pleural space.



Electronically signed by: Efe Alexander MD (4/14/2022 10:47 AM) [id number]

## 2022-04-14 NOTE — NUR
SS following up with discharge planning. SS reviewed pt chart and discussed with pt RN. Pt 
is currently requiring oxygen at two liters nasal canula. COVID19 negative. Pt has no home 
oxygen. Pulmonology and Cardiology following. Thoracentesis today. Pt on IV Rocephin. PT/OT 
recommended home independent. SS will continue to follow for discharge planning.

## 2022-04-14 NOTE — PDOC
DILLON MARTIN APRALBERTO 4/14/22 0921:


CARDIO Progress Notes


Date and Time


Date of Service


4/14/22


Time of Evaluation


0920





Subjective


Subjective:  No Chest Pain, No Palpitations, No Dizziness, Other (breathing 

improved )





Vitals


Vitals





Vital Signs








  Date Time  Temp Pulse Resp B/P (MAP) Pulse Ox O2 Delivery O2 Flow Rate FiO2


 


4/14/22 08:23  108  101/58    


 


4/14/22 07:00 98.6  16  99 Nasal Cannula 2.0 





 98.6       








Weight


Weight [ ]





Input and Output


Intake and Output











Intake and Output 


 


 4/14/22





 07:00


 


Intake Total 605 ml


 


Output Total 1350 ml


 


Balance -745 ml


 


 


 


Intake Oral 605 ml


 


Output Urine Total 1350 ml


 


# Voids 1











Laboratory


Labs





Laboratory Tests








Test


 4/13/22


13:06 4/14/22


04:15


 


Influenza Type A Antigen


 Negative


(NEGATIVE) 





 


Influenza Type B Antigen


 Negative


(NEGATIVE) 





 


SARS-CoV-2 Antigen (Rapid)


 Negative


(NEGATIVE) 





 


White Blood Count


 


 10.7 x10^3/uL


(4.0-11.0)


 


Red Blood Count


 


 3.14 x10^6/uL


(4.30-5.70)


 


Hemoglobin


 


 8.5 g/dL


(13.0-17.5)


 


Hematocrit


 


 26.1 %


(39.0-53.0)


 


Mean Corpuscular Volume  83 fL () 


 


Mean Corpuscular Hemoglobin  27 pg (25-35) 


 


Mean Corpuscular Hemoglobin


Concent 


 33 g/dL


(31-37)


 


Red Cell Distribution Width


 


 15.9 %


(11.5-14.5)


 


Platelet Count


 


 235 x10^3/uL


(140-400)


 


Neutrophils (%) (Auto)  75 % (31-73) 


 


Lymphocytes (%) (Auto)  12 % (24-48) 


 


Monocytes (%) (Auto)  12 % (0-9) 


 


Eosinophils (%) (Auto)  1 % (0-3) 


 


Basophils (%) (Auto)  1 % (0-3) 


 


Neutrophils # (Auto)


 


 8.0 x10^3/uL


(1.8-7.7)


 


Lymphocytes # (Auto)


 


 1.3 x10^3/uL


(1.0-4.8)


 


Monocytes # (Auto)


 


 1.3 x10^3/uL


(0.0-1.1)


 


Eosinophils # (Auto)


 


 0.1 x10^3/uL


(0.0-0.7)


 


Basophils # (Auto)


 


 0.1 x10^3/uL


(0.0-0.2)


 


Sodium Level


 


 139 mmol/L


(136-145)


 


Potassium Level


 


 3.9 mmol/L


(3.5-5.1)


 


Chloride Level


 


 102 mmol/L


()


 


Carbon Dioxide Level


 


 29 mmol/L


(21-32)


 


Anion Gap  8 (6-14) 


 


Blood Urea Nitrogen


 


 29 mg/dL


(8-26)


 


Creatinine


 


 1.5 mg/dL


(0.7-1.3)


 


Estimated GFR


(Cockcroft-Gault) 


 45.4 





 


Glucose Level


 


 120 mg/dL


(70-99)


 


Calcium Level


 


 8.8 mg/dL


(8.5-10.1)


 


Phosphorus Level


 


 3.7 mg/dL


(2.6-4.7)


 


Magnesium Level


 


 2.1 mg/dL


(1.8-2.4)











Physical Exam


HEENT:  Neck Supple W Full Motion


Chest:  Symmetric


LUNGS:  Other (diminished bases)


Heart:  RRR (ST)


Abdomen:  Soft N/T


Extremities:  No Edema


Neurology:  alert, oriented, follow commands





Assessment


Assessment


1.  Acute on chronic respiratory failure with a/c CHF and bilateral pleural e

ffusion. improved s/p thoracentesis with 800cc off bilaterally.


2.  Chest pain, mixed features; AMI ruled out.


3.  Acute on chronic systolic CHF; s/p IV Lasix 


4.  Cardiomyopathy; Echo with severe LV dysfunction with EF 15-20%. [facility]. Outpatient AICD reportedly planned  


5.  PAFIB with RVR; on metoprolol for rate control and Eliquis for stroke pr

ophylaxis.


6.  CAD s/p PCI 2008. No recent ischemic evaluation reported 


7.  Hypertension; low end


8.  Hyperlipidemia; statin 


9.  Diabetes, II


10.  Peripheral vascular disease.  Patient denies claudication symptoms.


11.  H/o CVA


12.  TERRY ; Cr ^ 1.5


13.  Low-grade fevers








Recommendations





Will hold off on further aggressive diuresis with ^ Cr


Continue metoprolol for rate control 


Resume Eliquis s/p thoracentesis 


HF optimization 


No ACEi/ARB for now with low end blood pressure


Secondary prevention


ASA/statin therapy


Ongoing pulmonary optimization


D/w Dr. Ovalle- he will arrange outpatient ischemic evaluation in near 

future. 


Supportive care





Justicifation of Admission Dx:


Justifications for Admission:


Justification of Admission Dx:  Yes





JOHANNA HERBERT MD 4/14/22 1935:


CARDIO Progress Notes


Assessment


Assessment


Patient seen and examined.  Agree with NP's assessment and plan


Ac on chr systolic HF better compensated


s/p thoracentesis with improvement in symptoms


AF with RVR - agree with metoprolol for rate control and eliquis for stroke 

prophylaxis


CP with atypical features - CAD clinically stable


Consider ischemic evaluation as outpatient


PAD stable











DILLON MARTIN           Apr 14, 2022 09:21


JOHANNA HERBERT MD           Apr 14, 2022 19:35

## 2022-04-14 NOTE — PDOC
[facility] PROGRESS NOTE


Date of Service


DOS:


DATE: 4/14/22 


TIME: 11:50





Chief Complaint


Chief Complaint


Assessment/Plan


Acute hypoxic respiratory failure


Bilateral pleural effusions status post thoracentesis 4/14/2022


Acute on chronic CHF exacerbation


Costochondritis


History of CHF with LVEF of 15 to 20% on echo 2/7/2022


History of CAD


History of MIRIAM


History of dyslipidemia


History of hypertension


History of atrial fibrillation on Eliquis








Admit to hospitalist service for further management


Cardiology consult for CHF management


Pulmonology consult for possible thoracentesis


Hold anticoagulation for now patient's chart, labs, images were reviewed and 

discussed with RN


Fluid and sodium restriction


Lovenox for DVT prophylaxis


Protonix GI prophylaxis


ADA diet


CODE STATUS full


Discussed with RN and SW


Disposition inpatient management as above


DPOA: Brisa Cabrera





History of Present Illness


History of Present Illness


76-year-old male with multiple comorbidities who comes in with shortness of 

breath and chest pain.  Shortness of breath has been going on for for few 

days.Patient's room air sat while I was in the room was 92% but he is quite 

tachypneic and has to stop frequently during the question-and-answer session to 

catch his breath.  Denies any fevers, abdominal pain, diarrhea, dysuria or 

syncope.





4/14/2022


No acute events overnight.  Patient seen examined bedside.  Saturating 99% on 2 

L nasal cannula.  Patient had thoracentesis done this morning with 800 cc of 

fluid removed.  Will await for fluid analysis.  Patient's chart, labs, images 

were reviewed and discussed with RN





Vitals/I&O


Vitals/I&O:





                                   Vital Signs








  Date Time  Temp Pulse Resp B/P (MAP) Pulse Ox O2 Delivery O2 Flow Rate FiO2


 


4/14/22 10:17  74 19 97/60 (72) 99 Nasal Cannula 2.0 


 


4/14/22 07:00 98.6       





 98.6       














                                    I & O   


 


 4/13/22 4/13/22 4/14/22





 15:00 23:00 07:00


 


Intake Total 0 ml 405 ml 200 ml


 


Output Total 500 ml 850 ml 


 


Balance -500 ml -445 ml 200 ml











Physical Exam


General:  Alert, Oriented X3, Cooperative, mild distress


Heart:  Other (sinus tachycardia )


Lungs:  Clear


Abdomen:  Soft, No tenderness


Extremities:  No edema


Skin:  No significant lesion





Labs


Labs:





Laboratory Tests








Test


 4/13/22


13:06 4/14/22


04:15


 


Influenza Type A Antigen


 Negative


(NEGATIVE) 





 


Influenza Type B Antigen


 Negative


(NEGATIVE) 





 


SARS-CoV-2 Antigen (Rapid)


 Negative


(NEGATIVE) 





 


White Blood Count


 


 10.7 x10^3/uL


(4.0-11.0)


 


Red Blood Count


 


 3.14 x10^6/uL


(4.30-5.70)


 


Hemoglobin


 


 8.5 g/dL


(13.0-17.5)


 


Hematocrit


 


 26.1 %


(39.0-53.0)


 


Mean Corpuscular Volume  83 fL () 


 


Mean Corpuscular Hemoglobin  27 pg (25-35) 


 


Mean Corpuscular Hemoglobin


Concent 


 33 g/dL


(31-37)


 


Red Cell Distribution Width


 


 15.9 %


(11.5-14.5)


 


Platelet Count


 


 235 x10^3/uL


(140-400)


 


Neutrophils (%) (Auto)  75 % (31-73) 


 


Lymphocytes (%) (Auto)  12 % (24-48) 


 


Monocytes (%) (Auto)  12 % (0-9) 


 


Eosinophils (%) (Auto)  1 % (0-3) 


 


Basophils (%) (Auto)  1 % (0-3) 


 


Neutrophils # (Auto)


 


 8.0 x10^3/uL


(1.8-7.7)


 


Lymphocytes # (Auto)


 


 1.3 x10^3/uL


(1.0-4.8)


 


Monocytes # (Auto)


 


 1.3 x10^3/uL


(0.0-1.1)


 


Eosinophils # (Auto)


 


 0.1 x10^3/uL


(0.0-0.7)


 


Basophils # (Auto)


 


 0.1 x10^3/uL


(0.0-0.2)


 


Sodium Level


 


 139 mmol/L


(136-145)


 


Potassium Level


 


 3.9 mmol/L


(3.5-5.1)


 


Chloride Level


 


 102 mmol/L


()


 


Carbon Dioxide Level


 


 29 mmol/L


(21-32)


 


Anion Gap  8 (6-14) 


 


Blood Urea Nitrogen


 


 29 mg/dL


(8-26)


 


Creatinine


 


 1.5 mg/dL


(0.7-1.3)


 


Estimated GFR


(Cockcroft-Gault) 


 45.4 





 


Glucose Level


 


 120 mg/dL


(70-99)


 


Calcium Level


 


 8.8 mg/dL


(8.5-10.1)


 


Phosphorus Level


 


 3.7 mg/dL


(2.6-4.7)


 


Magnesium Level


 


 2.1 mg/dL


(1.8-2.4)











Assessment and Plan


Assessmemt and Plan


Problems


Medical Problems:


(1) Bilateral pleural effusion


Status: Acute  





(2) Congestive heart failure (CHF)


Status: Acute  





(3) Shortness of breath


Status: Acute  











Comment


Review of Relevant


I have reviewed the following items ophelia (where applicable) has been applied.


Medications:





Current Medications








 Medications


  (Trade)  Dose


 Ordered  Sig/Joie


 Route


 PRN Reason  Start Time


 Stop Time Status Last Admin


Dose Admin


 


 Atorvastatin


 Calcium


  (Lipitor)  40 mg  HS


 PO


   4/13/22 21:00


    4/13/22 21:50





 


 Fluticasone


 Propionate


  (Flonase)  1 spray  BID


 NS


   4/13/22 21:00


    4/13/22 21:50





 


 Tamsulosin HCl


  (Flomax)  0.4 mg  BID


 PO


   4/13/22 21:00


    4/14/22 08:22














Justifications for Admission


Other Justification


CHF exacerbation











HILDA BROWN MD                  Apr 14, 2022 11:51

## 2022-04-14 NOTE — PDOC
PULMONARY PROGRESS NOTES


DATE: 4/14/22 


TIME: 12:00


Subjective


Denies any increased shortness of breath.


Thoracentesis scheduled for today.


Vitals





Vital Signs








  Date Time  Temp Pulse Resp B/P (MAP) Pulse Ox O2 Delivery O2 Flow Rate FiO2


 


4/14/22 10:17  74 19 97/60 (72) 99 Nasal Cannula 2.0 


 


4/14/22 07:00 98.6       





 98.6       








General:  Alert, Oriented X4, No acute distress


Lungs:  Other (Decreased at the bases.)


Cardiovascular:  S1, S2


Abdomen:  Soft, Non-tender


Extremities:  No Edema


Labs





Laboratory Tests








Test


 4/12/22


18:39 4/12/22


20:53 4/13/22


01:15 4/13/22


13:06


 


White Blood Count


 10.6 x10^3/uL


(4.0-11.0) 


 


 





 


Red Blood Count


 3.52 x10^6/uL


(4.30-5.70) 


 


 





 


Hemoglobin


 9.7 g/dL


(13.0-17.5) 


 


 





 


Hematocrit


 29.2 %


(39.0-53.0) 


 


 





 


Mean Corpuscular Volume 83 fL ()    


 


Mean Corpuscular Hemoglobin 28 pg (25-35)    


 


Mean Corpuscular Hemoglobin


Concent 33 g/dL


(31-37) 


 


 





 


Red Cell Distribution Width


 16.0 %


(11.5-14.5) 


 


 





 


Platelet Count


 274 x10^3/uL


(140-400) 


 


 





 


Neutrophils (%) (Auto) 79 % (31-73)    


 


Lymphocytes (%) (Auto) 10 % (24-48)    


 


Monocytes (%) (Auto) 9 % (0-9)    


 


Eosinophils (%) (Auto) 1 % (0-3)    


 


Basophils (%) (Auto) 1 % (0-3)    


 


Neutrophils # (Auto)


 8.4 x10^3/uL


(1.8-7.7) 


 


 





 


Lymphocytes # (Auto)


 1.1 x10^3/uL


(1.0-4.8) 


 


 





 


Monocytes # (Auto)


 1.0 x10^3/uL


(0.0-1.1) 


 


 





 


Eosinophils # (Auto)


 0.1 x10^3/uL


(0.0-0.7) 


 


 





 


Basophils # (Auto)


 0.1 x10^3/uL


(0.0-0.2) 


 


 





 


Prothrombin Time


 15.9 SEC


(11.7-14.0) 


 


 





 


Prothromb Time International


Ratio 1.3 (0.8-1.1) 


 


 


 





 


Activated Partial


Thromboplast Time 31 SEC (24-38) 


 


 


 





 


Sodium Level


 141 mmol/L


(136-145) 


 


 





 


Potassium Level


 3.9 mmol/L


(3.5-5.1) 


 


 





 


Chloride Level


 104 mmol/L


() 


 


 





 


Carbon Dioxide Level


 26 mmol/L


(21-32) 


 


 





 


Anion Gap 11 (6-14)    


 


Blood Urea Nitrogen


 22 mg/dL


(8-26) 


 


 





 


Creatinine


 1.3 mg/dL


(0.7-1.3) 


 


 





 


Estimated GFR


(Cockcroft-Gault) 53.5 


 


 


 





 


BUN/Creatinine Ratio 17 (6-20)    


 


Glucose Level


 139 mg/dL


(70-99) 


 


 





 


Calcium Level


 8.6 mg/dL


(8.5-10.1) 


 


 





 


Magnesium Level


 2.0 mg/dL


(1.8-2.4) 


 


 





 


Total Bilirubin


 0.3 mg/dL


(0.2-1.0) 


 


 





 


Aspartate Amino Transf


(AST/SGOT) 14 U/L (15-37) 


 


 


 





 


Alanine Aminotransferase


(ALT/SGPT) 19 U/L (16-63) 


 


 


 





 


Alkaline Phosphatase


 110 U/L


() 


 


 





 


Troponin I High Sensitivity 9 ng/L (4-75)  10 ng/L (4-75)  15 ng/L (4-75)  


 


NT-Pro-B-Type Natriuretic


Peptide 2784 pg/mL


(0-449) 


 


 





 


Total Protein


 6.8 g/dL


(6.4-8.2) 


 


 





 


Albumin


 3.5 g/dL


(3.4-5.0) 


 


 





 


Albumin/Globulin Ratio 1.1 (1.0-1.7)    


 


Influenza Type A Antigen


 


 


 


 Negative


(NEGATIVE)


 


Influenza Type B Antigen


 


 


 


 Negative


(NEGATIVE)


 


SARS-CoV-2 Antigen (Rapid)


 


 


 


 Negative


(NEGATIVE)


 


Test


 4/14/22


04:15 


 


 





 


White Blood Count


 10.7 x10^3/uL


(4.0-11.0) 


 


 





 


Red Blood Count


 3.14 x10^6/uL


(4.30-5.70) 


 


 





 


Hemoglobin


 8.5 g/dL


(13.0-17.5) 


 


 





 


Hematocrit


 26.1 %


(39.0-53.0) 


 


 





 


Mean Corpuscular Volume 83 fL ()    


 


Mean Corpuscular Hemoglobin 27 pg (25-35)    


 


Mean Corpuscular Hemoglobin


Concent 33 g/dL


(31-37) 


 


 





 


Red Cell Distribution Width


 15.9 %


(11.5-14.5) 


 


 





 


Platelet Count


 235 x10^3/uL


(140-400) 


 


 





 


Neutrophils (%) (Auto) 75 % (31-73)    


 


Lymphocytes (%) (Auto) 12 % (24-48)    


 


Monocytes (%) (Auto) 12 % (0-9)    


 


Eosinophils (%) (Auto) 1 % (0-3)    


 


Basophils (%) (Auto) 1 % (0-3)    


 


Neutrophils # (Auto)


 8.0 x10^3/uL


(1.8-7.7) 


 


 





 


Lymphocytes # (Auto)


 1.3 x10^3/uL


(1.0-4.8) 


 


 





 


Monocytes # (Auto)


 1.3 x10^3/uL


(0.0-1.1) 


 


 





 


Eosinophils # (Auto)


 0.1 x10^3/uL


(0.0-0.7) 


 


 





 


Basophils # (Auto)


 0.1 x10^3/uL


(0.0-0.2) 


 


 





 


Sodium Level


 139 mmol/L


(136-145) 


 


 





 


Potassium Level


 3.9 mmol/L


(3.5-5.1) 


 


 





 


Chloride Level


 102 mmol/L


() 


 


 





 


Carbon Dioxide Level


 29 mmol/L


(21-32) 


 


 





 


Anion Gap 8 (6-14)    


 


Blood Urea Nitrogen


 29 mg/dL


(8-26) 


 


 





 


Creatinine


 1.5 mg/dL


(0.7-1.3) 


 


 





 


Estimated GFR


(Cockcroft-Gault) 45.4 


 


 


 





 


Glucose Level


 120 mg/dL


(70-99) 


 


 





 


Calcium Level


 8.8 mg/dL


(8.5-10.1) 


 


 





 


Phosphorus Level


 3.7 mg/dL


(2.6-4.7) 


 


 





 


Magnesium Level


 2.1 mg/dL


(1.8-2.4) 


 


 











Laboratory Tests








Test


 4/13/22


13:06 4/14/22


04:15


 


Influenza Type A Antigen


 Negative


(NEGATIVE) 





 


Influenza Type B Antigen


 Negative


(NEGATIVE) 





 


SARS-CoV-2 Antigen (Rapid)


 Negative


(NEGATIVE) 





 


White Blood Count


 


 10.7 x10^3/uL


(4.0-11.0)


 


Red Blood Count


 


 3.14 x10^6/uL


(4.30-5.70)


 


Hemoglobin


 


 8.5 g/dL


(13.0-17.5)


 


Hematocrit


 


 26.1 %


(39.0-53.0)


 


Mean Corpuscular Volume  83 fL () 


 


Mean Corpuscular Hemoglobin  27 pg (25-35) 


 


Mean Corpuscular Hemoglobin


Concent 


 33 g/dL


(31-37)


 


Red Cell Distribution Width


 


 15.9 %


(11.5-14.5)


 


Platelet Count


 


 235 x10^3/uL


(140-400)


 


Neutrophils (%) (Auto)  75 % (31-73) 


 


Lymphocytes (%) (Auto)  12 % (24-48) 


 


Monocytes (%) (Auto)  12 % (0-9) 


 


Eosinophils (%) (Auto)  1 % (0-3) 


 


Basophils (%) (Auto)  1 % (0-3) 


 


Neutrophils # (Auto)


 


 8.0 x10^3/uL


(1.8-7.7)


 


Lymphocytes # (Auto)


 


 1.3 x10^3/uL


(1.0-4.8)


 


Monocytes # (Auto)


 


 1.3 x10^3/uL


(0.0-1.1)


 


Eosinophils # (Auto)


 


 0.1 x10^3/uL


(0.0-0.7)


 


Basophils # (Auto)


 


 0.1 x10^3/uL


(0.0-0.2)


 


Sodium Level


 


 139 mmol/L


(136-145)


 


Potassium Level


 


 3.9 mmol/L


(3.5-5.1)


 


Chloride Level


 


 102 mmol/L


()


 


Carbon Dioxide Level


 


 29 mmol/L


(21-32)


 


Anion Gap  8 (6-14) 


 


Blood Urea Nitrogen


 


 29 mg/dL


(8-26)


 


Creatinine


 


 1.5 mg/dL


(0.7-1.3)


 


Estimated GFR


(Cockcroft-Gault) 


 45.4 





 


Glucose Level


 


 120 mg/dL


(70-99)


 


Calcium Level


 


 8.8 mg/dL


(8.5-10.1)


 


Phosphorus Level


 


 3.7 mg/dL


(2.6-4.7)


 


Magnesium Level


 


 2.1 mg/dL


(1.8-2.4)








Medications





Active Scripts








 Medications  Dose


 Route/Sig


 Max Daily Dose Days Date Category


 


 Flomax


  (Tamsulosin Hcl)


 0.4 Mg Cap.er.24h  1 Cap


 PO BID


   4/13/22 Reported


 


 Furosemide 40 Mg


 Tablet  1 Tab


 PO DAILY


  30 2/7/22 Rx


 


 Metoprolol


 Succinate ( Xl )


  (Metoprolol


 Succinate) 25 Mg


 Tab.er.24h  25 Mg


 PO DAILY


  30 2/7/22 Rx


 


 Eliquis


  (Apixaban) 5 Mg


 Tablet  5 Mg


 PO BID


  30 2/7/22 Rx


 


 Refresh


 Lacri-Lube


 Ointment (Mineral


 Oil/Petrolatum,White)


 3.5 Gm Oint...g.  3.5 Gm


 OU DAILY


   2/4/22 Reported


 


 Atorvastatin


 Calcium 20 Mg


 Tablet  2 Tab


 PO HS


   2/4/22 Reported


 


 Omeprazole 20 Mg


 Capsule.dr  1 Cap


 PO DAILY


   2/4/22 Reported


 


 Lubricating Plus


  (Carboxymethylcellulose


 Sodium) 1 Each


 Droperette  1


 OU DAILY


   2/4/22 Reported


 


 Fluticasone


 Propionate Nasal


 Spray


  (Fluticasone


 Propionate) 16 Gm


 Spray.susp  1 Sprays


 NS BID


   2/4/22 Reported


 


 Proair Hfa


 Inhaler


  (Albuterol


 Sulfate) 8.5 Gm


 Hfa.aer.ad  1 Puff


 INH PRN Q6HRS PRN


   7/25/18 Reported


 


 Aspirin 325 Mg


 Tablet  1 Tab


 PO DAILY


   8/3/15 Reported











Impression


.


1.  Acute hypoxic respiratory failure secondary to acute on chronic systolic 


heart failure with bilateral pleural effusions.


2.  Low-grade fever.  Influenza and Covid negative.  Likely secondary to 

pneumonia.


3.  Abnormal CT chest with bilateral pleural effusion, which has been persistent


and moderate.  The patient would benefit from thoracentesis.  The effusion may 


have increased slightly since February CT chest.


4.  Underlying chronic obstructive pulmonary disease with 30 years of 


tobaccoism.





Plan


.





RECOMMENDATIONS:


1.  Scheduled for thoracentesis today.  Anticoagulation with Eliquis on hold.


2.  Continue present oxygen.


3.  Ruled out for influenza and COVID.  Bilateral lower lobe consolidation 

likely source of fever


4.  Monitor fever curve.  We will add empiric antibiotics.  


5.  Diuresis.


6.  Cardiology recommendations.


7.  Discussed with RN and Cardiology APRN.











GLEN GARCIA MD                 Apr 14, 2022 12:03

## 2022-04-14 NOTE — RAD
Bilateral Thoracentesis 4/14/2022 9:42 AM



Clinical History: Bilateral pleural effusion.



Technique:  Relative benefits risks and alternatives were discussed with the patient and/or their rep
resentative.  Written informed consent was obtained. The patient was placed in seated position. A shaniqua
eout procedure was performed. 



Sonographic assessment demonstrates a large pleural effusion. A site for skin entry was selected, and
 subsequently prepped and draped using sterile barrier technique.  1% lidocaine without epinepherine 
was administered for local anesthesia to the skin and subcutaenous tissues. 



A 5 Iranian sheathed needle was passed into the right pleural space.  Fluid was aspirated and the cath
eter was connected to a vacuum. Approximately 800 cc of fluid were drained. The catheter was removed 
and adequate hemostasis was obtained. This process was performed and essentially identical fashion on
 the contralateral side and again 800 cc of fluid was removed. A sterile dressings were applied. The 
patient tolerated the procedure well, without complications.



Impression: Bilateral thoracentesis, ultrasound-guided. 800 cc removed from each pleural space.



Electronically signed by: Efe Alexander MD (4/14/2022 10:47 AM) [id number]

## 2022-04-14 NOTE — RAD
PQRS Compliance Statement:



One or more of the following individualized dose reduction techniques were utilized for this examinat
ion:  

1. Automated exposure control  

2. Adjustment of the mA and/or kV according to patient size  

3. Use of iterative reconstruction technique





CT HEAD WITHOUT CONTRAST



History: Reason: code stroke;UNRESPONSIVE  



Comparison: CT head without contrast, February 12, 2020.



Technique: Axial images are obtained of the head from the skull base through the vertex without IV co
ntrast.



Findings: 

No mass-effect, midline shift, extra-axial fluid collection, hemorrhage, or obvious acute infarction 
is identified.  Basilar cisterns are patent.  



The ventricles and sulci are prominent, consistent with generalized cerebral atrophy. There is perive
ntricular white matter hypoattenuation.  This is a nonspecific finding but is commonly due to chronic
 small vessel ischemic disease.  



Bone windows demonstrate no acute calvarial abnormality. 

The visualized paranasal sinuses are clear. Mastoid air cells are well aerated. 



IMPRESSION:

1.  No acute intracranial abnormality.

2.  Generalized cerebral atrophy and periventricular white matter changes probably due to chronic sma
ll vessel ischemic disease.





**********FOR INTERNAL CODING PURPOSES**********



Critical result:



Findings discussed with patient's nurse on 6 S. at 4/14/2022 11:07 PM.



RESULT CODE: (C)  



  







1.  Electronically signed by: Felix Cooney MD (4/14/2022 11:07 PM) [facility]

## 2022-04-15 VITALS — SYSTOLIC BLOOD PRESSURE: 147 MMHG | DIASTOLIC BLOOD PRESSURE: 79 MMHG

## 2022-04-15 VITALS — SYSTOLIC BLOOD PRESSURE: 117 MMHG | DIASTOLIC BLOOD PRESSURE: 61 MMHG

## 2022-04-15 VITALS — DIASTOLIC BLOOD PRESSURE: 66 MMHG | SYSTOLIC BLOOD PRESSURE: 124 MMHG

## 2022-04-15 VITALS — DIASTOLIC BLOOD PRESSURE: 52 MMHG | SYSTOLIC BLOOD PRESSURE: 66 MMHG

## 2022-04-15 VITALS — DIASTOLIC BLOOD PRESSURE: 81 MMHG | SYSTOLIC BLOOD PRESSURE: 147 MMHG

## 2022-04-15 VITALS — SYSTOLIC BLOOD PRESSURE: 75 MMHG | DIASTOLIC BLOOD PRESSURE: 56 MMHG

## 2022-04-15 VITALS — SYSTOLIC BLOOD PRESSURE: 80 MMHG | DIASTOLIC BLOOD PRESSURE: 62 MMHG

## 2022-04-15 VITALS — DIASTOLIC BLOOD PRESSURE: 59 MMHG | SYSTOLIC BLOOD PRESSURE: 78 MMHG

## 2022-04-15 VITALS — SYSTOLIC BLOOD PRESSURE: 68 MMHG | DIASTOLIC BLOOD PRESSURE: 50 MMHG

## 2022-04-15 VITALS — DIASTOLIC BLOOD PRESSURE: 50 MMHG | SYSTOLIC BLOOD PRESSURE: 70 MMHG

## 2022-04-15 VITALS — SYSTOLIC BLOOD PRESSURE: 96 MMHG | DIASTOLIC BLOOD PRESSURE: 63 MMHG

## 2022-04-15 VITALS — DIASTOLIC BLOOD PRESSURE: 67 MMHG | SYSTOLIC BLOOD PRESSURE: 94 MMHG

## 2022-04-15 VITALS — DIASTOLIC BLOOD PRESSURE: 81 MMHG | SYSTOLIC BLOOD PRESSURE: 158 MMHG

## 2022-04-15 VITALS — SYSTOLIC BLOOD PRESSURE: 78 MMHG | DIASTOLIC BLOOD PRESSURE: 63 MMHG

## 2022-04-15 VITALS — SYSTOLIC BLOOD PRESSURE: 147 MMHG | DIASTOLIC BLOOD PRESSURE: 78 MMHG

## 2022-04-15 VITALS — SYSTOLIC BLOOD PRESSURE: 147 MMHG | DIASTOLIC BLOOD PRESSURE: 63 MMHG

## 2022-04-15 VITALS — SYSTOLIC BLOOD PRESSURE: 99 MMHG | DIASTOLIC BLOOD PRESSURE: 56 MMHG

## 2022-04-15 VITALS — DIASTOLIC BLOOD PRESSURE: 60 MMHG | SYSTOLIC BLOOD PRESSURE: 132 MMHG

## 2022-04-15 VITALS — SYSTOLIC BLOOD PRESSURE: 139 MMHG | DIASTOLIC BLOOD PRESSURE: 67 MMHG

## 2022-04-15 VITALS — DIASTOLIC BLOOD PRESSURE: 56 MMHG | SYSTOLIC BLOOD PRESSURE: 77 MMHG

## 2022-04-15 VITALS — DIASTOLIC BLOOD PRESSURE: 78 MMHG | SYSTOLIC BLOOD PRESSURE: 151 MMHG

## 2022-04-15 VITALS — SYSTOLIC BLOOD PRESSURE: 152 MMHG | DIASTOLIC BLOOD PRESSURE: 85 MMHG

## 2022-04-15 VITALS — SYSTOLIC BLOOD PRESSURE: 90 MMHG | DIASTOLIC BLOOD PRESSURE: 60 MMHG

## 2022-04-15 VITALS — DIASTOLIC BLOOD PRESSURE: 56 MMHG | SYSTOLIC BLOOD PRESSURE: 81 MMHG

## 2022-04-15 VITALS — SYSTOLIC BLOOD PRESSURE: 138 MMHG | DIASTOLIC BLOOD PRESSURE: 71 MMHG

## 2022-04-15 VITALS — SYSTOLIC BLOOD PRESSURE: 83 MMHG | DIASTOLIC BLOOD PRESSURE: 61 MMHG

## 2022-04-15 VITALS — SYSTOLIC BLOOD PRESSURE: 89 MMHG | DIASTOLIC BLOOD PRESSURE: 59 MMHG

## 2022-04-15 VITALS — SYSTOLIC BLOOD PRESSURE: 158 MMHG | DIASTOLIC BLOOD PRESSURE: 87 MMHG

## 2022-04-15 VITALS — DIASTOLIC BLOOD PRESSURE: 58 MMHG | SYSTOLIC BLOOD PRESSURE: 81 MMHG

## 2022-04-15 VITALS — DIASTOLIC BLOOD PRESSURE: 60 MMHG | SYSTOLIC BLOOD PRESSURE: 123 MMHG

## 2022-04-15 VITALS — SYSTOLIC BLOOD PRESSURE: 87 MMHG | DIASTOLIC BLOOD PRESSURE: 67 MMHG

## 2022-04-15 LAB
ANION GAP SERPL CALC-SCNC: 12 MMOL/L (ref 6–14)
BASE EXCESS ABG: -2 MMOL/L (ref -3–3)
BASOPHILS # BLD AUTO: 0 X10^3/UL (ref 0–0.2)
BASOPHILS NFR BLD: 0 % (ref 0–3)
BUN SERPL-MCNC: 39 MG/DL (ref 8–26)
CALCIUM SERPL-MCNC: 8.4 MG/DL (ref 8.5–10.1)
CHLORIDE SERPL-SCNC: 101 MMOL/L (ref 98–107)
CO2 SERPL-SCNC: 23 MMOL/L (ref 21–32)
CREAT SERPL-MCNC: 1.8 MG/DL (ref 0.7–1.3)
EOSINOPHIL NFR BLD: 0 % (ref 0–3)
EOSINOPHIL NFR BLD: 0 X10^3/UL (ref 0–0.7)
ERYTHROCYTE [DISTWIDTH] IN BLOOD BY AUTOMATED COUNT: 16.2 % (ref 11.5–14.5)
GFR SERPLBLD BASED ON 1.73 SQ M-ARVRAT: 36.8 ML/MIN
GLUCOSE SERPL-MCNC: 181 MG/DL (ref 70–99)
HCO3 BLDA-SCNC: 21 MMOL/L (ref 21–28)
HCT VFR BLD CALC: 27.4 % (ref 39–53)
HGB BLD-MCNC: 8.8 G/DL (ref 13–17.5)
INSPIRATION SETTING TIME VENT: (no result)
LYMPHOCYTES # BLD: 0.7 X10^3/UL (ref 1–4.8)
LYMPHOCYTES NFR BLD AUTO: 5 % (ref 24–48)
MAGNESIUM SERPL-MCNC: 2.2 MG/DL (ref 1.8–2.4)
MCH RBC QN AUTO: 27 PG (ref 25–35)
MCHC RBC AUTO-ENTMCNC: 32 G/DL (ref 31–37)
MCV RBC AUTO: 84 FL (ref 79–100)
MONO #: 0.9 X10^3/UL (ref 0–1.1)
MONOCYTES NFR BLD: 7 % (ref 0–9)
NEUT #: 11.9 X10^3/UL (ref 1.8–7.7)
NEUTROPHILS NFR BLD AUTO: 88 % (ref 31–73)
PCO2 BLDA: 30 MMHG (ref 35–46)
PLATELET # BLD AUTO: 286 X10^3/UL (ref 140–400)
PO2 BLDA: 127 MMHG (ref 65–108)
POTASSIUM SERPL-SCNC: 4.3 MMOL/L (ref 3.5–5.1)
RBC # BLD AUTO: 3.28 X10^6/UL (ref 4.3–5.7)
SAO2 % BLDA: 98 % (ref 92–99)
SODIUM SERPL-SCNC: 136 MMOL/L (ref 136–145)
WBC # BLD AUTO: 13.6 X10^3/UL (ref 4–11)

## 2022-04-15 PROCEDURE — B2111ZZ FLUOROSCOPY OF MULTIPLE CORONARY ARTERIES USING LOW OSMOLAR CONTRAST: ICD-10-PCS | Performed by: RADIOLOGY

## 2022-04-15 PROCEDURE — B548ZZA ULTRASONOGRAPHY OF SUPERIOR VENA CAVA, GUIDANCE: ICD-10-PCS | Performed by: RADIOLOGY

## 2022-04-15 PROCEDURE — 5A09357 ASSISTANCE WITH RESPIRATORY VENTILATION, LESS THAN 24 CONSECUTIVE HOURS, CONTINUOUS POSITIVE AIRWAY PRESSURE: ICD-10-PCS | Performed by: INTERNAL MEDICINE

## 2022-04-15 PROCEDURE — B211YZZ FLUOROSCOPY OF MULTIPLE CORONARY ARTERIES USING OTHER CONTRAST: ICD-10-PCS | Performed by: INTERNAL MEDICINE

## 2022-04-15 PROCEDURE — B2111ZZ FLUOROSCOPY OF MULTIPLE CORONARY ARTERIES USING LOW OSMOLAR CONTRAST: ICD-10-PCS | Performed by: INTERNAL MEDICINE

## 2022-04-15 PROCEDURE — 4A023N7 MEASUREMENT OF CARDIAC SAMPLING AND PRESSURE, LEFT HEART, PERCUTANEOUS APPROACH: ICD-10-PCS | Performed by: INTERNAL MEDICINE

## 2022-04-15 RX ADMIN — METOPROLOL SUCCINATE SCH MG: 25 TABLET, EXTENDED RELEASE ORAL at 09:00

## 2022-04-15 RX ADMIN — Medication SCH CAP: at 21:19

## 2022-04-15 RX ADMIN — Medication SCH CAP: at 09:00

## 2022-04-15 RX ADMIN — FLUTICASONE PROPIONATE SCH SPRAY: 50 SPRAY, METERED NASAL at 09:00

## 2022-04-15 RX ADMIN — PIPERACILLIN SODIUM AND TAZOBACTAM SODIUM SCH MLS/HR: 3; .375 INJECTION, POWDER, LYOPHILIZED, FOR SOLUTION INTRAVENOUS at 18:34

## 2022-04-15 RX ADMIN — TAMSULOSIN HYDROCHLORIDE SCH MG: 0.4 CAPSULE ORAL at 09:00

## 2022-04-15 RX ADMIN — FLUTICASONE PROPIONATE SCH SPRAY: 50 SPRAY, METERED NASAL at 21:20

## 2022-04-15 RX ADMIN — PIPERACILLIN SODIUM AND TAZOBACTAM SODIUM SCH MLS/HR: 3; .375 INJECTION, POWDER, LYOPHILIZED, FOR SOLUTION INTRAVENOUS at 23:24

## 2022-04-15 RX ADMIN — TAMSULOSIN HYDROCHLORIDE SCH MG: 0.4 CAPSULE ORAL at 21:19

## 2022-04-15 RX ADMIN — FUROSEMIDE SCH MG: 40 TABLET ORAL at 09:00

## 2022-04-15 RX ADMIN — PIPERACILLIN SODIUM AND TAZOBACTAM SODIUM SCH MLS/HR: 3; .375 INJECTION, POWDER, LYOPHILIZED, FOR SOLUTION INTRAVENOUS at 07:58

## 2022-04-15 RX ADMIN — AMIODARONE HYDROCHLORIDE PRN MLS/HR: 50 INJECTION, SOLUTION INTRAVENOUS at 13:11

## 2022-04-15 RX ADMIN — DICLOFENAC SODIUM SCH APP: 10 GEL TOPICAL at 21:20

## 2022-04-15 RX ADMIN — PIPERACILLIN SODIUM AND TAZOBACTAM SODIUM SCH MLS/HR: 3; .375 INJECTION, POWDER, LYOPHILIZED, FOR SOLUTION INTRAVENOUS at 12:00

## 2022-04-15 RX ADMIN — AMIODARONE HYDROCHLORIDE PRN MLS/HR: 50 INJECTION, SOLUTION INTRAVENOUS at 21:21

## 2022-04-15 RX ADMIN — ATORVASTATIN CALCIUM SCH MG: 20 TABLET, FILM COATED ORAL at 21:20

## 2022-04-15 RX ADMIN — DICLOFENAC SODIUM SCH APP: 10 GEL TOPICAL at 09:00

## 2022-04-15 RX ADMIN — HEPARIN SODIUM PRN MLS/HR: 10000 INJECTION, SOLUTION INTRAVENOUS at 04:43

## 2022-04-15 RX ADMIN — AMIODARONE HYDROCHLORIDE PRN MLS/HR: 50 INJECTION, SOLUTION INTRAVENOUS at 00:30

## 2022-04-15 RX ADMIN — MORPHINE SULFATE PRN MG: 2 INJECTION, SOLUTION INTRAMUSCULAR; INTRAVENOUS at 04:50

## 2022-04-15 NOTE — RAD
XR CHEST 1V



History: Reason: CENTRAL LINE PLACEMENT. WILL CALL WHEN READY / Spl. Instructions:  / History: 



Comparison: April 15, 2022



Findings:

Interval placement right IJ central line with tip projecting over the right atrium. Diffuse interstit
ial thickening with ill-defined opacities, decreased compared to prior. Small bilateral pleural effus
ions, unchanged. No pneumothorax.



Impression: 

1.  Interval placement right IJ central line. No pneumothorax.

2.  Diffuse interstitial thickening with ill-defined opacities, decreased compared to prior.



Electronically signed by: Kingsley Edwards DO (4/15/2022 12:29 PM) [id number]

## 2022-04-15 NOTE — PDOC
[employer] PROGRESS NOTE


Date of Service


DOS:


DATE: 4/15/22 


TIME: 14:14





Chief Complaint


Chief Complaint


Assessment/Plan


Acute hypoxic respiratory failure


Bilateral pleural effusions status post thoracentesis 4/14/2022


Acute on chronic CHF exacerbation


Costochondritis


History of CHF with LVEF of 15 to 20% on echo 2/7/2022


History of CAD


History of MIRIAM


History of dyslipidemia


History of hypertension


History of atrial fibrillation on Eliquis








Admit to hospitalist service for further management


Cardiology consult for CHF management


Pulmonology consult for possible thoracentesis


Hold anticoagulation for now patient's chart, labs, images were reviewed and 

discussed with RN


Fluid and sodium restriction


Lovenox for DVT prophylaxis


Protonix GI prophylaxis


ADA diet


CODE STATUS full


Discussed with RN and SW


Disposition inpatient management as above


DPOA: Brisa Deborah





History of Present Illness


History of Present Illness


76-year-old male with multiple comorbidities who comes in with shortness of 

breath and chest pain.  Shortness of breath has been going on for for few 

days.Patient's room air sat while I was in the room was 92% but he is quite 

tachypneic and has to stop frequently during the question-and-answer session to 

catch his breath.  Denies any fevers, abdominal pain, diarrhea, dysuria or 

syncope.





4/14/2022


No acute events overnight.  Patient seen examined bedside.  Saturating 99% on 2 

L nasal cannula.  Patient had thoracentesis done this morning with 800 cc of 

fluid removed.  Will await for fluid analysis.  Patient's chart, labs, images 

were reviewed and discussed with RN





4/15: Afebrile.  S/P bilateral thoracentesis yesterday.  Patient was moved down 

to the ICU after having apneic event.  Code stroke was was called and neurology 

consulted.  No concerns for CVA, per neurology.  Patient will be taken to the 

Cath Lab today to have been intra-aortic balloon pump placed.  Discussed with 

RN.





Vitals/I&O


Vitals/I&O:





                                   Vital Signs








  Date Time  Temp Pulse Resp B/P (MAP) Pulse Ox O2 Delivery O2 Flow Rate FiO2


 


4/15/22 13:56  90 23  100 Nasal Cannula 3.0 


 


4/15/22 13:40    159/110    


 


4/15/22 04:00 97.7       





 97.7       














                                    I & O   


 


 4/14/22 4/14/22 4/15/22





 15:00 23:00 07:00


 


Intake Total 180 ml 100 ml 73.17 ml


 


Output Total 1600 ml  


 


Balance -1420 ml 100 ml 73.17 ml











Physical Exam


General:  Alert, Oriented X3, Cooperative, mild distress


Heart:  Other (sinus tachycardia )


Lungs:  Other (Decreased at the bases.)


Abdomen:  Soft, No tenderness


Extremities:  No edema


Skin:  No significant lesion





Labs


Labs:





Laboratory Tests








Test


 4/14/22


22:27 4/14/22


23:15 4/15/22


02:10 4/15/22


02:45


 


Glucose (Fingerstick)


 161 mg/dL


(70-99) 


 


 





 


Sodium Level


 


 135 mmol/L


(136-145) 


 136 mmol/L


(136-145)


 


Potassium Level


 


 3.7 mmol/L


(3.5-5.1) 


 4.3 mmol/L


(3.5-5.1)


 


Chloride Level


 


 100 mmol/L


() 


 101 mmol/L


()


 


Carbon Dioxide Level


 


 27 mmol/L


(21-32) 


 23 mmol/L


(21-32)


 


Anion Gap  8 (6-14)   12 (6-14) 


 


Blood Urea Nitrogen


 


 36 mg/dL


(8-26) 


 39 mg/dL


(8-26)


 


Creatinine


 


 1.7 mg/dL


(0.7-1.3) 


 1.8 mg/dL


(0.7-1.3)


 


Estimated GFR


(Cockcroft-Gault) 


 39.3 


 


 36.8 





 


Glucose Level


 


 190 mg/dL


(70-99) 


 181 mg/dL


(70-99)


 


Lactic Acid Level


 


 2.8 mmol/L


(0.4-2.0) 


 2.1 mmol/L


(0.4-2.0)


 


Calcium Level


 


 8.8 mg/dL


(8.5-10.1) 


 8.4 mg/dL


(8.5-10.1)


 


Troponin I High Sensitivity


 


 79 ng/L (4-75) 


 


 616 ng/L


(4-75)


 


O2 Saturation   98 % (92-99)  


 


Arterial Blood pH


 


 


 7.47


(7.35-7.45) 





 


Arterial Blood pCO2 at


Patient Temp 


 


 30 mmHg


(35-46) 





 


Arterial Blood pO2 at Patient


Temp 


 


 127 mmHg


() 





 


Arterial Blood HCO3


 


 


 21 mmol/L


(21-28) 





 


Arterial Blood Base Excess


 


 


 -2 mmol/L


(-3-3) 





 


FiO2   40 (5l nc)  


 


White Blood Count


 


 


 


 13.6 x10^3/uL


(4.0-11.0)


 


Red Blood Count


 


 


 


 3.28 x10^6/uL


(4.30-5.70)


 


Hemoglobin


 


 


 


 8.8 g/dL


(13.0-17.5)


 


Hematocrit


 


 


 


 27.4 %


(39.0-53.0)


 


Mean Corpuscular Volume    84 fL () 


 


Mean Corpuscular Hemoglobin    27 pg (25-35) 


 


Mean Corpuscular Hemoglobin


Concent 


 


 


 32 g/dL


(31-37)


 


Red Cell Distribution Width


 


 


 


 16.2 %


(11.5-14.5)


 


Platelet Count


 


 


 


 286 x10^3/uL


(140-400)


 


Neutrophils (%) (Auto)    88 % (31-73) 


 


Lymphocytes (%) (Auto)    5 % (24-48) 


 


Monocytes (%) (Auto)    7 % (0-9) 


 


Eosinophils (%) (Auto)    0 % (0-3) 


 


Basophils (%) (Auto)    0 % (0-3) 


 


Neutrophils # (Auto)


 


 


 


 11.9 x10^3/uL


(1.8-7.7)


 


Lymphocytes # (Auto)


 


 


 


 0.7 x10^3/uL


(1.0-4.8)


 


Monocytes # (Auto)


 


 


 


 0.9 x10^3/uL


(0.0-1.1)


 


Eosinophils # (Auto)


 


 


 


 0.0 x10^3/uL


(0.0-0.7)


 


Basophils # (Auto)


 


 


 


 0.0 x10^3/uL


(0.0-0.2)


 


Magnesium Level


 


 


 


 2.2 mg/dL


(1.8-2.4)


 


NT-Pro-B-Type Natriuretic


Peptide 


 


 


 39879 pg/mL


(0-449)


 


Procalcitonin


 


 


 


 0.74 ng/mL


(0.00-0.10)


 


Test


 4/15/22


10:59 


 


 





 


Activated Partial


Thromboplast Time 36 SEC (24-38) 


 


 


 





 


Troponin I High Sensitivity


 69679 ng/L


(4-75) 


 


 














Assessment and Plan


Assessmemt and Plan


Problems


Medical Problems:


(1) Bilateral pleural effusion


Status: Acute  





(2) Congestive heart failure (CHF)


Status: Acute  





(3) Shortness of breath


Status: Acute  











Comment


Review of Relevant


I have reviewed the following items ophelia (where applicable) has been applied.


Medications:





Current Medications








 Medications


  (Trade)  Dose


 Ordered  Sig/Joie


 Route


 PRN Reason  Start Time


 Stop Time Status Last Admin


Dose Admin


 


 Lactobacillus


 Rhamnosus


  (Culturelle)  1 cap  BID


 PO


   4/14/22 21:00


    4/14/22 20:15





 


 Norepinephrine


 Bitartrate 8 mg/


 Dextrose  258 ml @ 


 11.262 mls/


 hr  CONT  PRN


 IV


 PER PROTOCOL  4/15/22 00:00


    4/15/22 13:11





 


 Heparin Sodium/


 Dextrose  250 ml @ 


 6.984 mls/


 hr  CONT  PRN


 IV


 PER PROTOCOL  4/15/22 04:30


    4/15/22 04:43





 


 Piperacillin Sod/


 Tazobactam Sod


 3.375 gm/Sodium


 Chloride  50 ml @ 


 100 mls/hr  Q6HRS


 IV


   4/15/22 07:30


    4/15/22 07:58





 


 Nitroglycerin


  (Nitroglycerin)  200 mcg  1X  ONCE


 IART


   4/15/22 13:00


 4/15/22 13:01 DC 4/15/22 13:39





 


 Verapamil HCl


  (Verapamil)  2.5 mg  1X  ONCE


 IART


   4/15/22 13:00


 4/15/22 13:01 DC 4/15/22 13:40





 


 Heparin Sodium


  (Porcine)


  (Heparin Sodium)  2,500 unit  1X  ONCE


 IART


   4/15/22 13:00


 4/15/22 13:01 DC 4/15/22 13:38





 


 Heparin Sodium/


 Sodium Chloride


  (HEPARIN for


 ARTERIAL LINE


 FLUSH)  1,000 unit  1X  ONCE


 IART


   4/15/22 13:00


 4/15/22 13:01 DC 4/15/22 13:33





 


 Heparin Sodium/


 Sodium Chloride


  (HEPARIN for


 ARTERIAL LINE


 FLUSH)  1,000 unit  1X  ONCE


 IART


   4/15/22 13:00


 4/15/22 13:01 DC 4/15/22 13:33





 


 Iodixanol


  (Visipaque 320)  100 ml  1X  ONCE


 IART


   4/15/22 13:00


 4/15/22 13:01 DC 4/15/22 13:33





 


 Lidocaine HCl


  (Xylocaine-Mpf


 1% 2ml Vial)  2 ml  1X  ONCE


 INJ


   4/15/22 13:00


 4/15/22 13:01 DC 4/15/22 13:34





 


 Heparin Sodium


  (Porcine)


  (Heparin 5,000


 Units/1,000ml NS)  5,000 unit  1X  ONCE


 IV


   4/15/22 13:15


 4/15/22 13:16 DC 4/15/22 13:15





 


 Lidocaine HCl


  (Lidocaine 1%


 20ml Vial)  20 ml  1X  ONCE


 INJ


   4/15/22 13:45


 4/15/22 13:46 DC 4/15/22 13:45














Justifications for Admission


Other Justification


CHF exacerbation











SANDY COVINGTON MD            Apr 15, 2022 14:16

## 2022-04-15 NOTE — PDOC2
NEUROLOGY CONSULT


Date of Service


DOS:


DATE: 4/15/22 


TIME: 10:09





Reason for Consult


Reason for Consult:


Possible seizure/stroke symptoms





Referring Physician


Referring Physician:


Dr. Marie





Source


Source:  Chart review, Patient





History of Present Illness


History of Present Illness


The patient is a 76-year-old right-handed male admitted 4/12 for heart failure 

and pleural effusion.  He had thoracentesis yesterday.  Last night he became 

lethargic and kept having apneic episodes.  He also had hypotension.  The nurse 

called me.  Patient had had a normal head CT.  He was not having any convulsive 

activity.  On the other hand, his troponin was markedly elevated.  Patient has a

history of sleep apnea and a history of posttraumatic stress disorder causing 

severe nightmares.  I saw him in 2014 for tremor and Dr. Orellana saw him in 2018 

for syncopal episodes.  At that time she worked him up with brain MRI, 7/25/18 

and EEG, 8/9/18, both negative.  Cervical MRI showed degenerative changes.  He 

feels back to himself this morning.





Past Medical History


Cardiovascular:  AFIB, CAD, CHF, HTN, Hyperlipidemia


Pulmonary:  Other (Sleep apnea)


Psych:  Anxiety, Other (Post traumatic stress disorder)


Musculoskeletal:   low back pain


ENT:  Other (Tinnitus)


Renal/:  Benign prostatic enlarg.


Endocrine:  Diabetes





Past Surgical History


Past Surgical History:  Cataract Removal, Hernia Repair ([facility]), Other 

(Coronary angioplasty, wrist)





Family History


Family History:  CAD





Social History


Social History


, ex-smoker, no alcohol





Current Medications


Current Medications





Current Medications


Aspirin (Aspirin Chewable) 324 mg 1X  ONCE PO  Last administered on 4/12/22at 

18:30;  Start 4/12/22 at 18:15;  Stop 4/12/22 at 18:16;  Status DC


Albuterol/ Ipratropium (Duoneb) 3 ml 1X  ONCE NEB  Last administered on 

4/12/22at 20:08;  Start 4/12/22 at 19:45;  Stop 4/12/22 at 19:46;  Status DC


Iohexol (Omnipaque 350 Mg/ml) 75 ml 1X  ONCE IV  Last administered on 4/12/22at 

19:45;  Start 4/12/22 at 19:45;  Stop 4/12/22 at 19:46;  Status DC


Acetaminophen (Tylenol) 650 mg PRN Q6HRS  PRN PO MILD PAIN / TEMP > 100.3'F;  

Start 4/12/22 at 21:15


Ondansetron HCl (Zofran) 4 mg PRN Q4HRS  PRN IVP NAUSEA/VOMITING 1st choice;  

Start 4/12/22 at 21:15


Guaifenesin (Robitussin Dm) 10 ml PRN Q6HRS  PRN PO COUGH;  Start 4/12/22 at 

21:15


Furosemide (Lasix) 40 mg 1X  ONCE IVP  Last administered on 4/12/22at 21:30;  

Start 4/12/22 at 21:30;  Stop 4/12/22 at 21:31;  Status DC


Morphine Sulfate (Morphine Sulfate) 2 mg PRN Q6HRS  PRN IVP SEVERE PAIN 7-10 

Last administered on 4/15/22at 04:50;  Start 4/13/22 at 00:00


Sennosides (Senna) 17.2 mg PRN BID  PRN PO CONSTIPATION;  Start 4/13/22 at 09:00


Docusate Sodium (Colace) 100 mg PRN DAILY  PRN PO HARD STOOLS;  Start 4/13/22 at

09:00


Ondansetron HCl (Zofran) 4 mg PRN Q6HRS  PRN IVP NAUSEA/VOMITING, 1st CHOICE;  

Start 4/13/22 at 09:00


Dextrose (Dextrose 50%-Water Syringe) 12.5 gm PRN Q15MIN  PRN IV SEE COMMENTS;  

Start 4/13/22 at 09:00


Acetaminophen (Tylenol) 650 mg PRN Q4HRS  PRN PO TEMP OVER 100.4F OR MILD PAIN; 

Start 4/13/22 at 09:00


Lorazepam (Ativan) 0.5 mg PRN Q6HRS  PRN PO ANXIETY / AGITATION;  Start 4/13/22 

at 09:00


Lorazepam (Ativan Inj) 0.25 mg PRN Q4HRS  PRN IV ANXIETY / AGITATION;  Start 

4/13/22 at 09:00


Enoxaparin Sodium (Lovenox 40mg Syringe) 40 mg Q24H SQ ;  Start 4/13/22 at 

09:00;  Stop 4/13/22 at 11:01;  Status DC


Prochlorperazine Edisylate (Compazine) 10 mg PRN Q6HRS  PRN IV NAUSEA/VOMITING, 

2nd CHOICE;  Start 4/13/22 at 09:00


Diphenhydramine HCl (Benadryl) 25 mg PRN Q6HRS  PRN IVP ITCHING;  Start 4/13/22 

at 09:00


Diphenhydramine HCl (Benadryl) 25 mg PRN Q6HRS  PRN PO ITCHING;  Start 4/13/22 

at 09:00


Diphenhydramine HCl (Benadryl) 25 mg PRN QHS  PRN PO INSOMNIA, 1st CHOICE;  Sta

rt 4/13/22 at 09:00


Zolpidem Tartrate (Ambien) 2.5 mg PRN QHS  PRN PO INSOMNIA, 2nd CHOICE;  Start 

4/13/22 at 09:00


Atorvastatin Calcium (Lipitor) 40 mg HS PO  Last administered on 4/14/22at 

20:15;  Start 4/13/22 at 21:00


Metoprolol Succinate (Toprol Xl) 25 mg DAILY PO  Last administered on 4/14/22at 

08:23;  Start 4/13/22 at 10:30


Diclofenac Sodium (Voltaren) 1 jatin BID TP  Last administered on 4/14/22at 20:21;

 Start 4/13/22 at 11:00


Furosemide (Lasix) 40 mg 1X  ONCE IVP  Last administered on 4/13/22at 11:33;  

Start 4/13/22 at 11:30;  Stop 4/13/22 at 11:31;  Status DC


Potassium Chloride (Klor-Con) 20 meq 1X  ONCE PO  Last administered on 4/13/22at

11:33;  Start 4/13/22 at 11:30;  Stop 4/13/22 at 11:31;  Status DC


Fluticasone Propionate (Flonase) 1 spray BID NS  Last administered on 4/14/22at 

20:16;  Start 4/13/22 at 21:00


Tamsulosin HCl (Flomax) 0.4 mg BID PO  Last administered on 4/14/22at 20:15;  

Start 4/13/22 at 21:00


Ceftriaxone Sodium (Rocephin) 1 gm Q24H IVP  Last administered on 4/14/22at 

12:44;  Start 4/14/22 at 13:00;  Stop 4/15/22 at 07:17;  Status DC


Apixaban (Eliquis) 5 mg BID PO ;  Start 4/14/22 at 21:00


Furosemide (Lasix) 40 mg DAILY PO  Last administered on 4/14/22at 16:25;  Start 

4/14/22 at 14:00


Info (Anti-Coagulation Monitoring By Pharmacy) 1 each PRN DAILY  PRN MC PER 

PROTOCOL;  Start 4/14/22 at 14:15


Lactobacillus Rhamnosus (Culturelle) 1 cap BID PO  Last administered on 

4/14/22at 20:15;  Start 4/14/22 at 21:00


Norepinephrine Bitartrate 8 mg/ Dextrose 258 ml @  11.262 mls/ hr CONT  PRN IV 

PER PROTOCOL Last administered on 4/15/22at 00:30;  Start 4/15/22 at 00:00


Heparin Sodium/ Dextrose 250 ml @  6.984 mls/ hr CONT  PRN IV PER PROTOCOL;  

Start 4/15/22 at 04:15;  Stop 4/15/22 at 04:28;  Status DC


Heparin Sodium (Porcine) (Heparin Sodium) 1,450 unit PRN Q6HRS  PRN IV FOR UFH 

LEVEL LESS THAN 0.2;  Start 4/15/22 at 04:15;  Stop 4/15/22 at 04:28;  Status DC


Heparin Sodium/ Dextrose 250 ml @  6.984 mls/ hr CONT  PRN IV PER PROTOCOL Last 

administered on 4/15/22at 04:43;  Start 4/15/22 at 04:30


Heparin Sodium (Porcine) (Heparin Sodium) 1,450 unit PRN Q6HRS  PRN IV FOR PTT <

40;  Start 4/15/22 at 04:30


Piperacillin Sod/ Tazobactam Sod (Zosyn Per Pharmacy) 1 each PRN DAILY  PRN MC 

SEE COMMENTS;  Start 4/15/22 at 07:30


Piperacillin Sod/ Tazobactam Sod 3.375 gm/Sodium Chloride 50 ml @  100 mls/hr 

Q6HRS IV  Last administered on 4/15/22at 07:58;  Start 4/15/22 at 07:30





Active Scripts


Active


Furosemide 40 Mg Tablet 1 Tab PO DAILY 30 Days


Metoprolol Succinate ( Xl ) (Metoprolol Succinate) 25 Mg Tab.er.24h 25 Mg PO 

DAILY 30 Days


Eliquis (Apixaban) 5 Mg Tablet 5 Mg PO BID 30 Days


Reported


Flomax (Tamsulosin Hcl) 0.4 Mg Cap.er.24h 1 Cap PO BID


Refresh Lacri-Lube Ointment (Mineral Oil/Petrolatum,White) 3.5 Gm Oint...g. 3.5 

Gm OU DAILY


Atorvastatin Calcium 20 Mg Tablet 2 Tab PO HS


Omeprazole 20 Mg Capsule.dr 1 Cap PO DAILY


Lubricating Plus (Carboxymethylcellulose Sodium) 1 Each Droperette 1 OU DAILY


Fluticasone Propionate Nasal Spray (Fluticasone Propionate) 16 Gm Spray.susp 1 

Sprays NS BID


Proair Hfa Inhaler (Albuterol Sulfate) 8.5 Gm Hfa.aer.ad 1 Puff INH PRN Q6HRS 

PRN


Aspirin 325 Mg Tablet 1 Tab PO DAILY





Allergies


Allergies:  


Coded Allergies:  


     ether (Verified  Adverse Reaction, Severe, "N/V", 2/4/22)





ROS


Review of System


Negative for fever, chills, weight loss, chest pain, indigestion, hematochezia, 

melena.  Positive for dyspnea and dysuria.  Full 14-point review of systems is 

negative.





Physical Exam


Physical Examination


General:


Well-developed, well-nourished white male in no acute distress


HEENT:


Normocephalic andatraumatic. Temporal arteriespulsatile and nontender.


Neck:


Supple without bruit, no meningismus


Musculoskeletal:


Stability:see neurologic. Gait exam:see neurologic. Tone:see 

neurologic.Strength:see neurologic.


Neurological:


Mental Status:intact, orientation, memory, attention span/concentration, 

language, fund of knowledge normal. Cranial Nerves:Pupils equal and reactive to

light, extraocular movements areintact, visual fields are full to 

confrontation. Facial sensation is normal. There is no facial asymmetry. 

Vestibulo-ocular reflex is intact. Palate elevates and tongue protrudes in mi

dline. All other cranial related problems are negative except as mentioned 

before.Reflexes:2+ and symmetric with flexor plantar responses. Motor:5/5 

strength with normal tone and bulk. Coordination:Finger-nose finger is normal. 

Rapid alternating movements and fine finger movements are intact. Gait:not 

tested. Sensory:Normal pinprick, vibration, light touch, proprioception.





Vitals


VITALS





Vital Signs








  Date Time  Temp Pulse Resp B/P (MAP) Pulse Ox O2 Delivery O2 Flow Rate FiO2


 


4/15/22 08:34     100 Nasal Cannula 2.0 


 


4/15/22 06:00  109 21 66/52 (57)    


 


4/15/22 04:00 97.7       





 97.7       











Labs


Labs





Laboratory Tests








Test


 4/13/22


13:06 4/14/22


04:15 4/14/22


22:27 4/14/22


23:15


 


Influenza Type A Antigen


 Negative


(NEGATIVE) 


 


 





 


Influenza Type B Antigen


 Negative


(NEGATIVE) 


 


 





 


SARS-CoV-2 Antigen (Rapid)


 Negative


(NEGATIVE) 


 


 





 


White Blood Count


 


 10.7 x10^3/uL


(4.0-11.0) 


 





 


Red Blood Count


 


 3.14 x10^6/uL


(4.30-5.70) 


 





 


Hemoglobin


 


 8.5 g/dL


(13.0-17.5) 


 





 


Hematocrit


 


 26.1 %


(39.0-53.0) 


 





 


Mean Corpuscular Volume  83 fL ()   


 


Mean Corpuscular Hemoglobin  27 pg (25-35)   


 


Mean Corpuscular Hemoglobin


Concent 


 33 g/dL


(31-37) 


 





 


Red Cell Distribution Width


 


 15.9 %


(11.5-14.5) 


 





 


Platelet Count


 


 235 x10^3/uL


(140-400) 


 





 


Neutrophils (%) (Auto)  75 % (31-73)   


 


Lymphocytes (%) (Auto)  12 % (24-48)   


 


Monocytes (%) (Auto)  12 % (0-9)   


 


Eosinophils (%) (Auto)  1 % (0-3)   


 


Basophils (%) (Auto)  1 % (0-3)   


 


Neutrophils # (Auto)


 


 8.0 x10^3/uL


(1.8-7.7) 


 





 


Lymphocytes # (Auto)


 


 1.3 x10^3/uL


(1.0-4.8) 


 





 


Monocytes # (Auto)


 


 1.3 x10^3/uL


(0.0-1.1) 


 





 


Eosinophils # (Auto)


 


 0.1 x10^3/uL


(0.0-0.7) 


 





 


Basophils # (Auto)


 


 0.1 x10^3/uL


(0.0-0.2) 


 





 


Sodium Level


 


 139 mmol/L


(136-145) 


 135 mmol/L


(136-145)


 


Potassium Level


 


 3.9 mmol/L


(3.5-5.1) 


 3.7 mmol/L


(3.5-5.1)


 


Chloride Level


 


 102 mmol/L


() 


 100 mmol/L


()


 


Carbon Dioxide Level


 


 29 mmol/L


(21-32) 


 27 mmol/L


(21-32)


 


Anion Gap  8 (6-14)   8 (6-14) 


 


Blood Urea Nitrogen


 


 29 mg/dL


(8-26) 


 36 mg/dL


(8-26)


 


Creatinine


 


 1.5 mg/dL


(0.7-1.3) 


 1.7 mg/dL


(0.7-1.3)


 


Estimated GFR


(Cockcroft-Gault) 


 45.4 


 


 39.3 





 


Glucose Level


 


 120 mg/dL


(70-99) 


 190 mg/dL


(70-99)


 


Calcium Level


 


 8.8 mg/dL


(8.5-10.1) 


 8.8 mg/dL


(8.5-10.1)


 


Phosphorus Level


 


 3.7 mg/dL


(2.6-4.7) 


 





 


Magnesium Level


 


 2.1 mg/dL


(1.8-2.4) 


 





 


Glucose (Fingerstick)


 


 


 161 mg/dL


(70-99) 





 


Lactic Acid Level


 


 


 


 2.8 mmol/L


(0.4-2.0)


 


Troponin I High Sensitivity    79 ng/L (4-75) 


 


Test


 4/15/22


02:10 4/15/22


02:45 


 





 


O2 Saturation 98 % (92-99)    


 


Arterial Blood pH


 7.47


(7.35-7.45) 


 


 





 


Arterial Blood pCO2 at


Patient Temp 30 mmHg


(35-46) 


 


 





 


Arterial Blood pO2 at Patient


Temp 127 mmHg


() 


 


 





 


Arterial Blood HCO3


 21 mmol/L


(21-28) 


 


 





 


Arterial Blood Base Excess


 -2 mmol/L


(-3-3) 


 


 





 


FiO2 40 (5l nc)    


 


White Blood Count


 


 13.6 x10^3/uL


(4.0-11.0) 


 





 


Red Blood Count


 


 3.28 x10^6/uL


(4.30-5.70) 


 





 


Hemoglobin


 


 8.8 g/dL


(13.0-17.5) 


 





 


Hematocrit


 


 27.4 %


(39.0-53.0) 


 





 


Mean Corpuscular Volume  84 fL ()   


 


Mean Corpuscular Hemoglobin  27 pg (25-35)   


 


Mean Corpuscular Hemoglobin


Concent 


 32 g/dL


(31-37) 


 





 


Red Cell Distribution Width


 


 16.2 %


(11.5-14.5) 


 





 


Platelet Count


 


 286 x10^3/uL


(140-400) 


 





 


Neutrophils (%) (Auto)  88 % (31-73)   


 


Lymphocytes (%) (Auto)  5 % (24-48)   


 


Monocytes (%) (Auto)  7 % (0-9)   


 


Eosinophils (%) (Auto)  0 % (0-3)   


 


Basophils (%) (Auto)  0 % (0-3)   


 


Neutrophils # (Auto)


 


 11.9 x10^3/uL


(1.8-7.7) 


 





 


Lymphocytes # (Auto)


 


 0.7 x10^3/uL


(1.0-4.8) 


 





 


Monocytes # (Auto)


 


 0.9 x10^3/uL


(0.0-1.1) 


 





 


Eosinophils # (Auto)


 


 0.0 x10^3/uL


(0.0-0.7) 


 





 


Basophils # (Auto)


 


 0.0 x10^3/uL


(0.0-0.2) 


 





 


Sodium Level


 


 136 mmol/L


(136-145) 


 





 


Potassium Level


 


 4.3 mmol/L


(3.5-5.1) 


 





 


Chloride Level


 


 101 mmol/L


() 


 





 


Carbon Dioxide Level


 


 23 mmol/L


(21-32) 


 





 


Anion Gap  12 (6-14)   


 


Blood Urea Nitrogen


 


 39 mg/dL


(8-26) 


 





 


Creatinine


 


 1.8 mg/dL


(0.7-1.3) 


 





 


Estimated GFR


(Cockcroft-Gault) 


 36.8 


 


 





 


Glucose Level


 


 181 mg/dL


(70-99) 


 





 


Lactic Acid Level


 


 2.1 mmol/L


(0.4-2.0) 


 





 


Calcium Level


 


 8.4 mg/dL


(8.5-10.1) 


 





 


Magnesium Level


 


 2.2 mg/dL


(1.8-2.4) 


 





 


Troponin I High Sensitivity


 


 616 ng/L


(4-75) 


 











Laboratory Tests








Test


 4/14/22


22:27 4/14/22


23:15 4/15/22


02:10 4/15/22


02:45


 


Glucose (Fingerstick)


 161 mg/dL


(70-99) 


 


 





 


Sodium Level


 


 135 mmol/L


(136-145) 


 136 mmol/L


(136-145)


 


Potassium Level


 


 3.7 mmol/L


(3.5-5.1) 


 4.3 mmol/L


(3.5-5.1)


 


Chloride Level


 


 100 mmol/L


() 


 101 mmol/L


()


 


Carbon Dioxide Level


 


 27 mmol/L


(21-32) 


 23 mmol/L


(21-32)


 


Anion Gap  8 (6-14)   12 (6-14) 


 


Blood Urea Nitrogen


 


 36 mg/dL


(8-26) 


 39 mg/dL


(8-26)


 


Creatinine


 


 1.7 mg/dL


(0.7-1.3) 


 1.8 mg/dL


(0.7-1.3)


 


Estimated GFR


(Cockcroft-Gault) 


 39.3 


 


 36.8 





 


Glucose Level


 


 190 mg/dL


(70-99) 


 181 mg/dL


(70-99)


 


Lactic Acid Level


 


 2.8 mmol/L


(0.4-2.0) 


 2.1 mmol/L


(0.4-2.0)


 


Calcium Level


 


 8.8 mg/dL


(8.5-10.1) 


 8.4 mg/dL


(8.5-10.1)


 


Troponin I High Sensitivity


 


 79 ng/L (4-75) 


 


 616 ng/L


(4-75)


 


O2 Saturation   98 % (92-99)  


 


Arterial Blood pH


 


 


 7.47


(7.35-7.45) 





 


Arterial Blood pCO2 at


Patient Temp 


 


 30 mmHg


(35-46) 





 


Arterial Blood pO2 at Patient


Temp 


 


 127 mmHg


() 





 


Arterial Blood HCO3


 


 


 21 mmol/L


(21-28) 





 


Arterial Blood Base Excess


 


 


 -2 mmol/L


(-3-3) 





 


FiO2   40 (5l nc)  


 


White Blood Count


 


 


 


 13.6 x10^3/uL


(4.0-11.0)


 


Red Blood Count


 


 


 


 3.28 x10^6/uL


(4.30-5.70)


 


Hemoglobin


 


 


 


 8.8 g/dL


(13.0-17.5)


 


Hematocrit


 


 


 


 27.4 %


(39.0-53.0)


 


Mean Corpuscular Volume    84 fL () 


 


Mean Corpuscular Hemoglobin    27 pg (25-35) 


 


Mean Corpuscular Hemoglobin


Concent 


 


 


 32 g/dL


(31-37)


 


Red Cell Distribution Width


 


 


 


 16.2 %


(11.5-14.5)


 


Platelet Count


 


 


 


 286 x10^3/uL


(140-400)


 


Neutrophils (%) (Auto)    88 % (31-73) 


 


Lymphocytes (%) (Auto)    5 % (24-48) 


 


Monocytes (%) (Auto)    7 % (0-9) 


 


Eosinophils (%) (Auto)    0 % (0-3) 


 


Basophils (%) (Auto)    0 % (0-3) 


 


Neutrophils # (Auto)


 


 


 


 11.9 x10^3/uL


(1.8-7.7)


 


Lymphocytes # (Auto)


 


 


 


 0.7 x10^3/uL


(1.0-4.8)


 


Monocytes # (Auto)


 


 


 


 0.9 x10^3/uL


(0.0-1.1)


 


Eosinophils # (Auto)


 


 


 


 0.0 x10^3/uL


(0.0-0.7)


 


Basophils # (Auto)


 


 


 


 0.0 x10^3/uL


(0.0-0.2)


 


Magnesium Level


 


 


 


 2.2 mg/dL


(1.8-2.4)











Images


Images


CT HEAD WITHOUT CONTRAST, 4/14





History: Reason: code stroke;UNRESPONSIVE  





Comparison: CT head without contrast, February 12, 2020.





Technique: Axial images are obtained of the head from the skull base through the

vertex without IV contrast.





Findings: 


No mass-effect, midline shift, extra-axial fluid collection, hemorrhage, or 

obvious acute infarction is identified.  Basilar cisterns are patent.  





The ventricles and sulci are prominent, consistent with generalized cerebral 

atrophy. There is periventricular white matter hypoattenuation.  This is a 

nonspecific finding but is commonly due to chronic small vessel ischemic dis

ease.  





Bone windows demonstrate no acute calvarial abnormality. 


The visualized paranasal sinuses are clear. Mastoid air cells are well aerated. 





IMPRESSION:


1.  No acute intracranial abnormality.


2.  Generalized cerebral atrophy and periventricular white matter changes 

probably due to chronic small vessel ischemic disease.





Assessment/Plan


Assessment/Plan


Impression:


Based on the nurse's description, the patient was most likely having Cheyne-

Schroeder respiration from his heart failure and hypotension.  Add to this his 

history of sleep apnea.  Also interesting is his history of nightmares and sleep

issues, he says that he has had trouble like this before when he is falling 

asleep.  I do not see any evidence that he has had a stroke, transient ischemic 

attack, or seizure.  He has been fully worked up for this in the past.





Recommendations:


No additional neurological studies needed


Treatment of medical issues.





Thank you for letting me help with the patient's care.











BELLA ALVAREZ MD           Apr 15, 2022 10:18

## 2022-04-15 NOTE — RAD
EXAM: AP View of the chest



DATE: 4/14/2022 11:56 PM



INDICATION: Reason: Sudden SOA / Spl. Instructions:  / History: 



COMPARISON: 4/12/2022



FINDINGS:

Mild cardiomegaly. Aorta is tortuous. Diffuse bilateral parenchymal airspace opacities, progressed co
mpared to 4/12/2022. Trace pleural effusions. No pneumothorax.



IMPRESSION:

1.  Progressing bilateral parenchymal opacities compared to 4/12/2022



Electronically signed by: Ric Park MD (4/15/2022 1:03 AM) CECILIO

## 2022-04-15 NOTE — NUR
At approx. 2300 this nurse checked on pt.  Pt was sitting up in his bed, watching tv.  When 
asked, pt stated he was "doing fine".  5-10 minutes later, pt used call button to summon 
nsg.  Pt stated that he could not breathe and had removed his nasal cannula.  Nsg then put 
his nasal cannula back on and took pt's vital signs.  Pt suddenly slumped down and became 
non responsive.  Nsg immediately called for assistance from the charge nurse and a code 
stroke was called.  Pt was taken to CT which resulted negative for acute changes.  

Pt would repeatedly lose conciousness, stop breathing for approximately 10-20 seconds, then 
gasp for air.  Nsg contacted Dr. Marie.  Orders placed for 500mls NS bolus X2 (if 1st not 
effective), then transfer to ICU if condition does not improve.  Dr. Villanueva consulted and 
no further orders given.  Pt was placed on bipap.  Pt's wife was contacted and kept updated 
on pt's status.

After 1000mls of NS pt had not improved.  BP was 65/45, hr- 115.  Pt transferred to ICU, 
report given.  

Pt's wife was contacted and informed of transfer.

## 2022-04-15 NOTE — RAD
Procedure: Placement of right internal jugular triple-lumen central venous catheter with ultrasound g
uidance.

Sterility: All elements of maximal sterile barrier technique including the use of a cap, mask, steril
e gown, sterile gloves, large sterile sheet, appropriate hand hygiene, and 2% chlorhexidine for cutan
eous antisepsis (or acceptable alternative antiseptic per current guidelines) were followed for this 
procedure.



Consent: The procedure was explained in its entirety to the patient or the patients designated repres
entative by a member of the treatment team, including a discussion of the risks, benefits and commonl
y accepted alternatives to the procedure, as well as the expected consequences of no therapy whatsoev
er.   Discussion of the risks included, but was not limited to, those that are most frequent and thos
e that are rare but possibly severe or life-threatening, as well as the possibility of unforeseen com
plications.



Technique and Findings: Following informed consent, the patient was prepped and draped in the usual s
terile fashion.  Ultrasound interrogation of the right neck revealed patency and compressibility of t
he right internal jugular vein.  A 21-gauge micropuncture was then used to gain access to this vein u
nder ultrasound guidance.  A hard copy ultrasound image was recorded. A guidewire was advanced centra
lly over which, following dilatation a triple-lumen central venous catheter was placed. The new indigo
ter was found to flush and aspirate normally. The catheter was secured in place. Sterile dressings we
re applied. No immediate complications were identified.



IMPRESSION: Placement of a a right internal jugular triple-lumen central venous catheter

 

 



Electronically signed by: Efe Alexander MD (4/15/2022 12:47 PM) [id number]

## 2022-04-15 NOTE — EKG
[facility]

              [address] [place] [address]

Test Date:    2022               Test Time:    23:00:12

Pat Name:     EVERETTE MONTEMAYOR        Department:   

Patient ID:   [id number]           Room:         114 1

Gender:       M                        Technician:   

:          [id number]               Requested By: SANDY COVINGTON

Order Number: [id number]           Reading MD:   Yakov De La Rosa

                                 Measurements

Intervals                              Axis          

Rate:         122                      P:            -29

NE:           152                      QRS:          -24

QRSD:         104                      T:            102

QT:           322                                    

QTc:          460                                    

                           Interpretive Statements

SINUS TACHYCARDIA

ATRIAL PREMATURE COMPLEX(ES)

LEFTWARD AXIS

LOW LIMB LEAD VOLTAGE

QRS(T) CONTOUR ABNORMALITY

CONSIDER ANTEROLATERAL MYOCARDIAL DAMAGE

CONSIDER INFERIOR MYOCARDIAL DAMAGE

POSSIBLY ABNORMAL ECG

Electronically Signed On 4- 13:14:28 CDT by Yakov De La Rosa

## 2022-04-15 NOTE — CARD
MR#: [id number]

Account#: [id number]

Accession#: [id number]

Date of Study: 04/15/2022

Ordering Physician: NELSON NGUYEN, 

Referring Physician: NELSON NGUYEN, 

Tech: RT Honey(R)





--------------- APPROVED REPORT --------------





Technologist: RT Honey(R)

Nurse: Rosi Wright RN



Procedure(s) performed: Fluoro time: 3.4 min

dose: 23 gycm2

contrast: 20cc visi

no sedation

Left heart catheterization, coronary angiography

Intra-aortic balloon pump placement for cardiogenic shock



INDICATION

The indication(s) include : non-STEMI .



[facility] Clinical Frailty Scale

[facility] Clinical Frailty Scale: Severely Frail



Heart Failure

Heart Failure: Yes

If Yes, Newly Diagnosed: No

If Yes, HF Type: Diastolic     Systolic     

If Yes, NYHA Class: Class III     



CASE TECHNIQUE

IV conscious sedation was used throughout procedure with appropriate monitoring and was performed in 
the presence of a registered nurse who was an independent trained observer other than the physician p
erforming the procedure. During this case, Fluoroscopy and low osmolar contrast were used for imaging
. Specimen(s) Removed: N/A Estimated Blood loss: 10 cc's.



PROCEDURE NARRATIVE

Clinical information:

76-year-old male presented to the hospital with heart failure and underwent diuresis and removal of f
luid via thoracenteses.  Earlier this afternoon he was found to have worsening cardiogenic shock, non
sustained VT, chest discomfort and an elevated troponin of 14.  In this setting he was urgently taken
 to the catheterization laboratory for further evaluation and treatment.



Procedure details:

The right groin and right wrist were prepped and draped in usual sterile fashion.  Under 1% lidocaine
 local anesthesia a 6 Maltese sheath was placed in the right radial artery.  Next diagnostic angiograp
hy was performed with a 6 Maltese TIG catheter.  Left ventricular end-diastolic pressure was obtained 
with a pigtail catheter and a pullback was performed.



Findings:

Aorta 80/60

LVEDP 35 mmHg

No LV to aortic pullback gradient



Coronary angiography:

Left main is a moderate caliber vessel with mild luminal irregularities

LAD is a heavily calcified small caliber vessel with a proximal to mid sequential 90% stenosis.

Left circumflex is a moderate caliber nondominant vessel with mild diffuse irregularities

OM1 is a small caliber vessel with normal angiographic appearance

OM 2 is a small caliber vessel with long proximal to mid 70% stenosis

RCA is a large-caliber dominant vessel with sequential proximal to mid 50 to 90% stenoses.



Intervertebral pump placement:

Given the patient's cardiac shock, requirement of vasopressor therapy and severe three-vessel coronar
y artery disease a decision was made to place a mechanical circulatory support device to help augment
 cardiac flow.  The right groin was infiltrated with 1% lidocaine local anesthesia and via ultrasound
 guidance a 6 Maltese sheath was placed in the right common femoral artery.  Next, a 7.5 Maltese intra-
aortic balloon pump sheath was placed through which a 7.5 Maltese balloon pump was placed in the desce
nding thoracic aorta with appropriate one-to-one augmentation.  The patient was continued on Levophed
 and heparin drips.



At case completion the right groin sheath was sutured to the skin and the radial sheath was removed a
nd hemostasis was achieved via a Latrell radial band.



No acute complications.



Conclusion

1.  Cardiogenic shock with acute on chronic systolic and diastolic heart failure

2.  Severe three-vessel coronary artery disease

3.  Successful placement of a 7.5 Maltese 40 cc Intermedic balloon pump for cardiac shock





Recommendations

1.  Continue medication optimization.  Given the patient's renal failure and significant cardiomyopat
hy with three-vessel coronary artery disease further intervention was deferred today in an effort to 
discuss the risks and benefits of high risk PCI with the patient's family and the patient's primary c
ardiologist Dr. Ovalle



Signed by : Nelson Nguyen, 

Electronically Approved : 04/15/2022 17:49:28

## 2022-04-15 NOTE — EKG
[facility]

              [address] [place] [address]

Test Date:    2022-04-15               Test Time:    05:22:02

Pat Name:     EVERETTE MONTEMAYOR        Department:   

Patient ID:   [id number]           Room:         114 1

Gender:       M                        Technician:   MICHAEL

:          1945               Requested By: HILDA BROWN

Order Number: [id number]           Reading MD:   Avery Lester MD

                                 Measurements

Intervals                              Axis          

Rate:         110                      P:            41

LA:           164                      QRS:          -28

QRSD:         98                       T:            75

QT:           340                                    

QTc:          466                                    

                           Interpretive Statements

SINUS TACHYCARDIA

PAC

INFERIOR INFARCT, OLD

Electronically Signed On 4- 12:03:09 CDT by Avery Lester MD

## 2022-04-15 NOTE — NUR
Rapid Response:



While walking to rapid response 6S staff called a code stroke on the same pt.  Upon arrival 
to room pt not responding to staff, or noxious stimuli. Eyes are open and pt having long 
periods of apnea. Initial BP 66/51 , SPO2 98%. This nurse and RT took pt to CT. 
Radiologist called results with nothing acute noted. Pt back in room and he became alert 
asking what happened and c/o chest pain. EKG ordered, and labs. EKG nothing acute, Troponin 
79. Dr. Marie notified of pts condition orders received to give a fluid bolus and to transfer 
to ICU if bp not better for pressors. Pt continues to go in and out of consciousness and 
stops breathing each time. Pt bagged several times and he would wake up during bagging and 
ask what is going on. Bipap placed on pt to help with respiratory rate. Dr Villanueva also 
notified of code stroke called and pt condition. No new orders received. Pt transferred to 
room 114.

-------------------------------------------------------------------------------

Addendum: 04/15/22 at 0139 by DIPAK ROSADO RN

-------------------------------------------------------------------------------

Amended: Links added.

## 2022-04-15 NOTE — NUR
This RN called Dr. Henson to discuss heparin gtt and aPTT levels. Patient still having 
bleeding issues from radial access site and according to titration protocol patients 
heparing gtt was to be bolused and rate increased. Dr. henson instructed this rn not to 
follow protocol at this time and recheck aptt in 6 hours and reevaluate at that time.

## 2022-04-15 NOTE — PDOC
PULMONARY PROGRESS NOTES


DATE: 4/15/22 


TIME: 07:20


Subjective


Status post bilateral thoracentesis at 4/14/2022


Patient became lethargic and less responsive and was having apneic episodes last

night.  Code stroke was called.  CT head reportedly negative.  Patient also 

became hypotensive.  Currently on low-dose Levophed.


Responds to commands.


Vitals





Vital Signs








  Date Time  Temp Pulse Resp B/P (MAP) Pulse Ox O2 Delivery O2 Flow Rate FiO2


 


4/15/22 06:00  109 21 66/52 (57) 100 Nasal Cannula 3.0 


 


4/15/22 04:00 97.7       





 97.7       








General:  No acute distress, Lethargic


Lungs:  Other (Decreased at the bases.)


Cardiovascular:  S1, S2


Abdomen:  Soft, Non-tender


Extremities:  No Edema


Skin:  Warm


Labs





Laboratory Tests








Test


 4/13/22


13:06 4/14/22


04:15 4/14/22


22:27 4/14/22


23:15


 


Influenza Type A Antigen


 Negative


(NEGATIVE) 


 


 





 


Influenza Type B Antigen


 Negative


(NEGATIVE) 


 


 





 


SARS-CoV-2 Antigen (Rapid)


 Negative


(NEGATIVE) 


 


 





 


White Blood Count


 


 10.7 x10^3/uL


(4.0-11.0) 


 





 


Red Blood Count


 


 3.14 x10^6/uL


(4.30-5.70) 


 





 


Hemoglobin


 


 8.5 g/dL


(13.0-17.5) 


 





 


Hematocrit


 


 26.1 %


(39.0-53.0) 


 





 


Mean Corpuscular Volume  83 fL ()   


 


Mean Corpuscular Hemoglobin  27 pg (25-35)   


 


Mean Corpuscular Hemoglobin


Concent 


 33 g/dL


(31-37) 


 





 


Red Cell Distribution Width


 


 15.9 %


(11.5-14.5) 


 





 


Platelet Count


 


 235 x10^3/uL


(140-400) 


 





 


Neutrophils (%) (Auto)  75 % (31-73)   


 


Lymphocytes (%) (Auto)  12 % (24-48)   


 


Monocytes (%) (Auto)  12 % (0-9)   


 


Eosinophils (%) (Auto)  1 % (0-3)   


 


Basophils (%) (Auto)  1 % (0-3)   


 


Neutrophils # (Auto)


 


 8.0 x10^3/uL


(1.8-7.7) 


 





 


Lymphocytes # (Auto)


 


 1.3 x10^3/uL


(1.0-4.8) 


 





 


Monocytes # (Auto)


 


 1.3 x10^3/uL


(0.0-1.1) 


 





 


Eosinophils # (Auto)


 


 0.1 x10^3/uL


(0.0-0.7) 


 





 


Basophils # (Auto)


 


 0.1 x10^3/uL


(0.0-0.2) 


 





 


Sodium Level


 


 139 mmol/L


(136-145) 


 135 mmol/L


(136-145)


 


Potassium Level


 


 3.9 mmol/L


(3.5-5.1) 


 3.7 mmol/L


(3.5-5.1)


 


Chloride Level


 


 102 mmol/L


() 


 100 mmol/L


()


 


Carbon Dioxide Level


 


 29 mmol/L


(21-32) 


 27 mmol/L


(21-32)


 


Anion Gap  8 (6-14)   8 (6-14) 


 


Blood Urea Nitrogen


 


 29 mg/dL


(8-26) 


 36 mg/dL


(8-26)


 


Creatinine


 


 1.5 mg/dL


(0.7-1.3) 


 1.7 mg/dL


(0.7-1.3)


 


Estimated GFR


(Cockcroft-Gault) 


 45.4 


 


 39.3 





 


Glucose Level


 


 120 mg/dL


(70-99) 


 190 mg/dL


(70-99)


 


Calcium Level


 


 8.8 mg/dL


(8.5-10.1) 


 8.8 mg/dL


(8.5-10.1)


 


Phosphorus Level


 


 3.7 mg/dL


(2.6-4.7) 


 





 


Magnesium Level


 


 2.1 mg/dL


(1.8-2.4) 


 





 


Glucose (Fingerstick)


 


 


 161 mg/dL


(70-99) 





 


Lactic Acid Level


 


 


 


 2.8 mmol/L


(0.4-2.0)


 


Troponin I High Sensitivity    79 ng/L (4-75) 


 


Test


 4/15/22


02:10 4/15/22


02:45 


 





 


O2 Saturation 98 % (92-99)    


 


Arterial Blood pH


 7.47


(7.35-7.45) 


 


 





 


Arterial Blood pCO2 at


Patient Temp 30 mmHg


(35-46) 


 


 





 


Arterial Blood pO2 at Patient


Temp 127 mmHg


() 


 


 





 


Arterial Blood HCO3


 21 mmol/L


(21-28) 


 


 





 


Arterial Blood Base Excess


 -2 mmol/L


(-3-3) 


 


 





 


FiO2 40 (5l nc)    


 


White Blood Count


 


 13.6 x10^3/uL


(4.0-11.0) 


 





 


Red Blood Count


 


 3.28 x10^6/uL


(4.30-5.70) 


 





 


Hemoglobin


 


 8.8 g/dL


(13.0-17.5) 


 





 


Hematocrit


 


 27.4 %


(39.0-53.0) 


 





 


Mean Corpuscular Volume  84 fL ()   


 


Mean Corpuscular Hemoglobin  27 pg (25-35)   


 


Mean Corpuscular Hemoglobin


Concent 


 32 g/dL


(31-37) 


 





 


Red Cell Distribution Width


 


 16.2 %


(11.5-14.5) 


 





 


Platelet Count


 


 286 x10^3/uL


(140-400) 


 





 


Neutrophils (%) (Auto)  88 % (31-73)   


 


Lymphocytes (%) (Auto)  5 % (24-48)   


 


Monocytes (%) (Auto)  7 % (0-9)   


 


Eosinophils (%) (Auto)  0 % (0-3)   


 


Basophils (%) (Auto)  0 % (0-3)   


 


Neutrophils # (Auto)


 


 11.9 x10^3/uL


(1.8-7.7) 


 





 


Lymphocytes # (Auto)


 


 0.7 x10^3/uL


(1.0-4.8) 


 





 


Monocytes # (Auto)


 


 0.9 x10^3/uL


(0.0-1.1) 


 





 


Eosinophils # (Auto)


 


 0.0 x10^3/uL


(0.0-0.7) 


 





 


Basophils # (Auto)


 


 0.0 x10^3/uL


(0.0-0.2) 


 





 


Sodium Level


 


 136 mmol/L


(136-145) 


 





 


Potassium Level


 


 4.3 mmol/L


(3.5-5.1) 


 





 


Chloride Level


 


 101 mmol/L


() 


 





 


Carbon Dioxide Level


 


 23 mmol/L


(21-32) 


 





 


Anion Gap  12 (6-14)   


 


Blood Urea Nitrogen


 


 39 mg/dL


(8-26) 


 





 


Creatinine


 


 1.8 mg/dL


(0.7-1.3) 


 





 


Estimated GFR


(Cockcroft-Gault) 


 36.8 


 


 





 


Glucose Level


 


 181 mg/dL


(70-99) 


 





 


Lactic Acid Level


 


 2.1 mmol/L


(0.4-2.0) 


 





 


Calcium Level


 


 8.4 mg/dL


(8.5-10.1) 


 





 


Magnesium Level


 


 2.2 mg/dL


(1.8-2.4) 


 





 


Troponin I High Sensitivity


 


 616 ng/L


(4-75) 


 











Laboratory Tests








Test


 4/14/22


22:27 4/14/22


23:15 4/15/22


02:10 4/15/22


02:45


 


Glucose (Fingerstick)


 161 mg/dL


(70-99) 


 


 





 


Sodium Level


 


 135 mmol/L


(136-145) 


 136 mmol/L


(136-145)


 


Potassium Level


 


 3.7 mmol/L


(3.5-5.1) 


 4.3 mmol/L


(3.5-5.1)


 


Chloride Level


 


 100 mmol/L


() 


 101 mmol/L


()


 


Carbon Dioxide Level


 


 27 mmol/L


(21-32) 


 23 mmol/L


(21-32)


 


Anion Gap  8 (6-14)   12 (6-14) 


 


Blood Urea Nitrogen


 


 36 mg/dL


(8-26) 


 39 mg/dL


(8-26)


 


Creatinine


 


 1.7 mg/dL


(0.7-1.3) 


 1.8 mg/dL


(0.7-1.3)


 


Estimated GFR


(Cockcroft-Gault) 


 39.3 


 


 36.8 





 


Glucose Level


 


 190 mg/dL


(70-99) 


 181 mg/dL


(70-99)


 


Lactic Acid Level


 


 2.8 mmol/L


(0.4-2.0) 


 2.1 mmol/L


(0.4-2.0)


 


Calcium Level


 


 8.8 mg/dL


(8.5-10.1) 


 8.4 mg/dL


(8.5-10.1)


 


Troponin I High Sensitivity


 


 79 ng/L (4-75) 


 


 616 ng/L


(4-75)


 


O2 Saturation   98 % (92-99)  


 


Arterial Blood pH


 


 


 7.47


(7.35-7.45) 





 


Arterial Blood pCO2 at


Patient Temp 


 


 30 mmHg


(35-46) 





 


Arterial Blood pO2 at Patient


Temp 


 


 127 mmHg


() 





 


Arterial Blood HCO3


 


 


 21 mmol/L


(21-28) 





 


Arterial Blood Base Excess


 


 


 -2 mmol/L


(-3-3) 





 


FiO2   40 (5l nc)  


 


White Blood Count


 


 


 


 13.6 x10^3/uL


(4.0-11.0)


 


Red Blood Count


 


 


 


 3.28 x10^6/uL


(4.30-5.70)


 


Hemoglobin


 


 


 


 8.8 g/dL


(13.0-17.5)


 


Hematocrit


 


 


 


 27.4 %


(39.0-53.0)


 


Mean Corpuscular Volume    84 fL () 


 


Mean Corpuscular Hemoglobin    27 pg (25-35) 


 


Mean Corpuscular Hemoglobin


Concent 


 


 


 32 g/dL


(31-37)


 


Red Cell Distribution Width


 


 


 


 16.2 %


(11.5-14.5)


 


Platelet Count


 


 


 


 286 x10^3/uL


(140-400)


 


Neutrophils (%) (Auto)    88 % (31-73) 


 


Lymphocytes (%) (Auto)    5 % (24-48) 


 


Monocytes (%) (Auto)    7 % (0-9) 


 


Eosinophils (%) (Auto)    0 % (0-3) 


 


Basophils (%) (Auto)    0 % (0-3) 


 


Neutrophils # (Auto)


 


 


 


 11.9 x10^3/uL


(1.8-7.7)


 


Lymphocytes # (Auto)


 


 


 


 0.7 x10^3/uL


(1.0-4.8)


 


Monocytes # (Auto)


 


 


 


 0.9 x10^3/uL


(0.0-1.1)


 


Eosinophils # (Auto)


 


 


 


 0.0 x10^3/uL


(0.0-0.7)


 


Basophils # (Auto)


 


 


 


 0.0 x10^3/uL


(0.0-0.2)


 


Magnesium Level


 


 


 


 2.2 mg/dL


(1.8-2.4)








Medications





Active Scripts








 Medications  Dose


 Route/Sig


 Max Daily Dose Days Date Category


 


 Flomax


  (Tamsulosin Hcl)


 0.4 Mg Cap.er.24h  1 Cap


 PO BID


   4/13/22 Reported


 


 Furosemide 40 Mg


 Tablet  1 Tab


 PO DAILY


  30 2/7/22 Rx


 


 Metoprolol


 Succinate ( Xl )


  (Metoprolol


 Succinate) 25 Mg


 Tab.er.24h  25 Mg


 PO DAILY


  30 2/7/22 Rx


 


 Eliquis


  (Apixaban) 5 Mg


 Tablet  5 Mg


 PO BID


  30 2/7/22 Rx


 


 Refresh


 Lacri-Lube


 Ointment (Mineral


 Oil/Petrolatum,White)


 3.5 Gm Oint...g.  3.5 Gm


 OU DAILY


   2/4/22 Reported


 


 Atorvastatin


 Calcium 20 Mg


 Tablet  2 Tab


 PO HS


   2/4/22 Reported


 


 Omeprazole 20 Mg


 Capsule.dr  1 Cap


 PO DAILY


   2/4/22 Reported


 


 Lubricating Plus


  (Carboxymethylcellulose


 Sodium) 1 Each


 Droperette  1


 OU DAILY


   2/4/22 Reported


 


 Fluticasone


 Propionate Nasal


 Spray


  (Fluticasone


 Propionate) 16 Gm


 Spray.susp  1 Sprays


 NS BID


   2/4/22 Reported


 


 Proair Hfa


 Inhaler


  (Albuterol


 Sulfate) 8.5 Gm


 Hfa.aer.ad  1 Puff


 INH PRN Q6HRS PRN


   7/25/18 Reported


 


 Aspirin 325 Mg


 Tablet  1 Tab


 PO DAILY


   8/3/15 Reported








Comments


Chest x-ray reviewed 4/15/2022.


Diffuse bilateral interstitial infiltrates





Impression


.


1.  Acute hypoxic respiratory failure secondary to acute on chronic systolic 


heart failure with bilateral pleural effusions.


2.  Low-grade fever.  Influenza and Covid negative.  Likely secondary to 

pneumonia.  Currently on Rocephin.  Would benefit from broadening the 

antibiotic.


3.  Abnormal CT chest with bilateral pleural effusion, which has been persistent


and moderate.  Status post bilateral thoracentesis 4/14/2022


4.  Underlying chronic obstructive pulmonary disease with 30 years of 


tobaccoism.


5.  Hypertension.  Status post 1 L of IV fluid.  With chest x-ray worsening, 

would avoid further IV fluids.


6.  Encephalopathy, likely contributed by hypotension.  CT head negative.  

Patient also received 1 dose of IV morphine which may have contributed as well.





Plan


.





RECOMMENDATIONS:


1.  Status post thoracentesis.  Follow pleural fluid analysis.  Restarted on 

Eliquis.


2.  Continue present oxygen.  As needed BiPAP.


3.  Ruled out for influenza and COVID.  Pneumonia is likely possibility for the 

source of fever.  Started on Rocephin.  Will broaden the antibiotic and changed 

to Zosyn.


4.  Avoid any benzos and narcotics.


5.  Hold off diuresis.  Obtain proBNP.  Obtain pro calcitonin as well


6.  Cardiology recommendations.


7.  Discussed with RN 


      Critical care time 30 minutes











GLEN GARCIA MD                 Apr 15, 2022 07:25

## 2022-04-15 NOTE — PDOC
CARDIOLOGY PROGRESS NOTE


SUBJECTIVE:


Overnight had resp problems. 


Concern for stroke, negative CT head, evaluated by neuro


Today denies any dyspnea but requiring significant pressors. 


Trop increased to 14





OBJECTIVE:


Vital Signs/I&O:





                                   Vital Signs








  Date Time  Temp Pulse Resp B/P (MAP) Pulse Ox O2 Delivery O2 Flow Rate FiO2


 


4/15/22 08:34     100 Nasal Cannula 2.0 


 


4/15/22 06:00  109 21 66/52 (57)    


 


4/15/22 04:00 97.7       





 97.7       














                                    I & O   


 


 4/14/22 4/14/22 4/15/22





 14:59 22:59 06:59


 


Intake Total 180 ml 100 ml 73.17 ml


 


Output Total 1600 ml  


 


Balance -1420 ml 100 ml 73.17 ml








Objective:


a/o x 3. NAD


No edema. 


RRR, no m/r/g. 


2+ radial pulses. 


Non-focal neuro exam.





CURRENT MEDICATIONS:


Eliquis held


Hep gtt


Lasix IV


Levophed gtt


Atorvastatin


Toprol - Held





DIAGNOSTIC TESTING:


EKG demonstrates SR with LBBB, prior inferior infarct


Tele with frequent PVC's and NSVT


Lab reviewed, Cr 1.8, Hgb stable


Labs:


                                Laboratory Tests


4/15/22 02:45











Laboratory Tests








Test


 4/14/22


22:27 4/14/22


23:15 4/15/22


02:10 4/15/22


02:45


 


Glucose (Fingerstick)


 161 mg/dL


(70-99)  H 


 


 





 


Sodium Level


 


 135 mmol/L


(136-145)  L 


 136 mmol/L


(136-145)


 


Potassium Level


 


 3.7 mmol/L


(3.5-5.1) 


 4.3 mmol/L


(3.5-5.1)


 


Chloride Level


 


 100 mmol/L


() 


 101 mmol/L


()


 


Carbon Dioxide Level


 


 27 mmol/L


(21-32) 


 23 mmol/L


(21-32)


 


Anion Gap  8 (6-14)    12 (6-14)  


 


Blood Urea Nitrogen


 


 36 mg/dL


(8-26)  H 


 39 mg/dL


(8-26)  H


 


Creatinine


 


 1.7 mg/dL


(0.7-1.3)  H 


 1.8 mg/dL


(0.7-1.3)  H


 


Estimated GFR


(Cockcroft-Gault) 


 39.3  


 


 36.8  





 


Glucose Level


 


 190 mg/dL


(70-99)  H 


 181 mg/dL


(70-99)  H


 


Lactic Acid Level


 


 2.8 mmol/L


(0.4-2.0)  H 


 2.1 mmol/L


(0.4-2.0)  H


 


Calcium Level


 


 8.8 mg/dL


(8.5-10.1) 


 8.4 mg/dL


(8.5-10.1)  L


 


Troponin I High Sensitivity


 


 79 ng/L (4-75)


H 


 616 ng/L


(4-75)  H


 


O2 Saturation   98 % (92-99)   


 


Arterial Blood pH


 


 


 7.47


(7.35-7.45)  H 





 


Arterial Blood pCO2 at


Patient Temp 


 


 30 mmHg


(35-46)  L 





 


Arterial Blood pO2 at Patient


Temp 


 


 127 mmHg


()  H 





 


Arterial Blood HCO3


 


 


 21 mmol/L


(21-28) 





 


Arterial Blood Base Excess


 


 


 -2 mmol/L


(-3-3) 





 


FiO2   40 (5l nc)   


 


White Blood Count


 


 


 


 13.6 x10^3/uL


(4.0-11.0)  H


 


Red Blood Count


 


 


 


 3.28 x10^6/uL


(4.30-5.70)  L


 


Hemoglobin


 


 


 


 8.8 g/dL


(13.0-17.5)  L


 


Hematocrit


 


 


 


 27.4 %


(39.0-53.0)  L


 


Mean Corpuscular Volume


 


 


 


 84 fL ()





 


Mean Corpuscular Hemoglobin    27 pg (25-35)  


 


Mean Corpuscular Hemoglobin


Concent 


 


 


 32 g/dL


(31-37)


 


Red Cell Distribution Width


 


 


 


 16.2 %


(11.5-14.5)  H


 


Platelet Count


 


 


 


 286 x10^3/uL


(140-400)


 


Neutrophils (%) (Auto)    88 % (31-73)  H


 


Lymphocytes (%) (Auto)    5 % (24-48)  L


 


Monocytes (%) (Auto)    7 % (0-9)  


 


Eosinophils (%) (Auto)    0 % (0-3)  


 


Basophils (%) (Auto)    0 % (0-3)  


 


Neutrophils # (Auto)


 


 


 


 11.9 x10^3/uL


(1.8-7.7)  H


 


Lymphocytes # (Auto)


 


 


 


 0.7 x10^3/uL


(1.0-4.8)  L


 


Monocytes # (Auto)


 


 


 


 0.9 x10^3/uL


(0.0-1.1)


 


Eosinophils # (Auto)


 


 


 


 0.0 x10^3/uL


(0.0-0.7)


 


Basophils # (Auto)


 


 


 


 0.0 x10^3/uL


(0.0-0.2)


 


Procalcitonin


 


 


 


 0.74 ng/mL


(0.00-0.10)  H


 


Test


 4/15/22


10:59 


 


 





 


Activated Partial


Thromboplast Time 36 SEC (24-38)


 


 


 





 


Troponin I High Sensitivity


 69856 ng/L


(4-75)  H 


 


 














ASSESSMENT:


1. Ischemic CMP


2. Cardiogenic shock


3. NSTEMI, trop of 14


4. Acute on chronic resp failure





PLAN:


1. Continue present therapy


2. Plan for cath lab to evaluate his coronary anatomy and right heart cath given

significant troponin elevation.





Justicifation of Admission Dx:


Justifications for Admission:


Justification of Admission Dx:  Yes











NELSON NGUYEN MD       Apr 15, 2022 12:07

## 2022-04-15 NOTE — NUR
Pt brought to ICU at 0007 from 6S. Pt was brought up on 6L NC, going in and out of apnea 
several times and gasping for air when breathing resumed after sternal rubs. Pt was put on 
bipap and Levo was started, pt did not lose consciousness once levo was started. Pt alert to 
self at time of admission but within the hour pt became more alert and responsive . Pt sats 
maintained >95% and pt was put on 3L with Etco2 monitor for apnea. Troponin of 616 was 
called to Dr. Marie who ordered a repeat EKG and heparin gtt.

## 2022-04-16 VITALS — DIASTOLIC BLOOD PRESSURE: 53 MMHG | SYSTOLIC BLOOD PRESSURE: 108 MMHG

## 2022-04-16 VITALS — SYSTOLIC BLOOD PRESSURE: 128 MMHG | DIASTOLIC BLOOD PRESSURE: 73 MMHG

## 2022-04-16 VITALS — SYSTOLIC BLOOD PRESSURE: 136 MMHG | DIASTOLIC BLOOD PRESSURE: 75 MMHG

## 2022-04-16 VITALS — SYSTOLIC BLOOD PRESSURE: 118 MMHG | DIASTOLIC BLOOD PRESSURE: 64 MMHG

## 2022-04-16 VITALS — SYSTOLIC BLOOD PRESSURE: 134 MMHG | DIASTOLIC BLOOD PRESSURE: 58 MMHG

## 2022-04-16 VITALS — DIASTOLIC BLOOD PRESSURE: 60 MMHG | SYSTOLIC BLOOD PRESSURE: 118 MMHG

## 2022-04-16 VITALS — DIASTOLIC BLOOD PRESSURE: 63 MMHG | SYSTOLIC BLOOD PRESSURE: 127 MMHG

## 2022-04-16 VITALS — SYSTOLIC BLOOD PRESSURE: 118 MMHG | DIASTOLIC BLOOD PRESSURE: 69 MMHG

## 2022-04-16 VITALS — DIASTOLIC BLOOD PRESSURE: 64 MMHG | SYSTOLIC BLOOD PRESSURE: 146 MMHG

## 2022-04-16 VITALS — DIASTOLIC BLOOD PRESSURE: 63 MMHG | SYSTOLIC BLOOD PRESSURE: 121 MMHG

## 2022-04-16 VITALS — DIASTOLIC BLOOD PRESSURE: 67 MMHG | SYSTOLIC BLOOD PRESSURE: 122 MMHG

## 2022-04-16 VITALS — DIASTOLIC BLOOD PRESSURE: 74 MMHG | SYSTOLIC BLOOD PRESSURE: 131 MMHG

## 2022-04-16 VITALS — SYSTOLIC BLOOD PRESSURE: 110 MMHG | DIASTOLIC BLOOD PRESSURE: 57 MMHG

## 2022-04-16 VITALS — DIASTOLIC BLOOD PRESSURE: 65 MMHG | SYSTOLIC BLOOD PRESSURE: 133 MMHG

## 2022-04-16 VITALS — SYSTOLIC BLOOD PRESSURE: 121 MMHG | DIASTOLIC BLOOD PRESSURE: 63 MMHG

## 2022-04-16 VITALS — DIASTOLIC BLOOD PRESSURE: 60 MMHG | SYSTOLIC BLOOD PRESSURE: 125 MMHG

## 2022-04-16 VITALS — DIASTOLIC BLOOD PRESSURE: 52 MMHG | SYSTOLIC BLOOD PRESSURE: 117 MMHG

## 2022-04-16 VITALS — SYSTOLIC BLOOD PRESSURE: 116 MMHG | DIASTOLIC BLOOD PRESSURE: 54 MMHG

## 2022-04-16 VITALS — SYSTOLIC BLOOD PRESSURE: 102 MMHG | DIASTOLIC BLOOD PRESSURE: 58 MMHG

## 2022-04-16 VITALS — DIASTOLIC BLOOD PRESSURE: 81 MMHG | SYSTOLIC BLOOD PRESSURE: 135 MMHG

## 2022-04-16 VITALS — DIASTOLIC BLOOD PRESSURE: 62 MMHG | SYSTOLIC BLOOD PRESSURE: 124 MMHG

## 2022-04-16 VITALS — SYSTOLIC BLOOD PRESSURE: 114 MMHG | DIASTOLIC BLOOD PRESSURE: 60 MMHG

## 2022-04-16 VITALS — DIASTOLIC BLOOD PRESSURE: 63 MMHG | SYSTOLIC BLOOD PRESSURE: 135 MMHG

## 2022-04-16 VITALS — SYSTOLIC BLOOD PRESSURE: 117 MMHG | DIASTOLIC BLOOD PRESSURE: 64 MMHG

## 2022-04-16 VITALS — SYSTOLIC BLOOD PRESSURE: 119 MMHG | DIASTOLIC BLOOD PRESSURE: 59 MMHG

## 2022-04-16 VITALS — DIASTOLIC BLOOD PRESSURE: 58 MMHG | SYSTOLIC BLOOD PRESSURE: 116 MMHG

## 2022-04-16 VITALS — SYSTOLIC BLOOD PRESSURE: 124 MMHG | DIASTOLIC BLOOD PRESSURE: 64 MMHG

## 2022-04-16 VITALS — SYSTOLIC BLOOD PRESSURE: 112 MMHG | DIASTOLIC BLOOD PRESSURE: 44 MMHG

## 2022-04-16 LAB
ANION GAP SERPL CALC-SCNC: 9 MMOL/L (ref 6–14)
BASOPHILS # BLD AUTO: 0.1 X10^3/UL (ref 0–0.2)
BASOPHILS NFR BLD: 1 % (ref 0–3)
BUN SERPL-MCNC: 40 MG/DL (ref 8–26)
CALCIUM SERPL-MCNC: 8.3 MG/DL (ref 8.5–10.1)
CHLORIDE SERPL-SCNC: 102 MMOL/L (ref 98–107)
CO2 SERPL-SCNC: 26 MMOL/L (ref 21–32)
CREAT SERPL-MCNC: 1.6 MG/DL (ref 0.7–1.3)
EOSINOPHIL NFR BLD: 0.3 X10^3/UL (ref 0–0.7)
EOSINOPHIL NFR BLD: 3 % (ref 0–3)
ERYTHROCYTE [DISTWIDTH] IN BLOOD BY AUTOMATED COUNT: 16.1 % (ref 11.5–14.5)
GFR SERPLBLD BASED ON 1.73 SQ M-ARVRAT: 42.1 ML/MIN
GLUCOSE SERPL-MCNC: 132 MG/DL (ref 70–99)
HCT VFR BLD CALC: 25.9 % (ref 39–53)
HGB BLD-MCNC: 8.4 G/DL (ref 13–17.5)
LYMPHOCYTES # BLD: 1.4 X10^3/UL (ref 1–4.8)
LYMPHOCYTES NFR BLD AUTO: 13 % (ref 24–48)
MAGNESIUM SERPL-MCNC: 2.1 MG/DL (ref 1.8–2.4)
MCH RBC QN AUTO: 27 PG (ref 25–35)
MCHC RBC AUTO-ENTMCNC: 32 G/DL (ref 31–37)
MCV RBC AUTO: 82 FL (ref 79–100)
MONO #: 1.1 X10^3/UL (ref 0–1.1)
MONOCYTES NFR BLD: 11 % (ref 0–9)
NEUT #: 7.3 X10^3/UL (ref 1.8–7.7)
NEUTROPHILS NFR BLD AUTO: 72 % (ref 31–73)
PLATELET # BLD AUTO: 243 X10^3/UL (ref 140–400)
POTASSIUM SERPL-SCNC: 3.4 MMOL/L (ref 3.5–5.1)
RBC # BLD AUTO: 3.14 X10^6/UL (ref 4.3–5.7)
SODIUM SERPL-SCNC: 137 MMOL/L (ref 136–145)
WBC # BLD AUTO: 10.2 X10^3/UL (ref 4–11)

## 2022-04-16 RX ADMIN — PIPERACILLIN SODIUM AND TAZOBACTAM SODIUM SCH MLS/HR: 3; .375 INJECTION, POWDER, LYOPHILIZED, FOR SOLUTION INTRAVENOUS at 11:39

## 2022-04-16 RX ADMIN — PIPERACILLIN SODIUM AND TAZOBACTAM SODIUM SCH MLS/HR: 3; .375 INJECTION, POWDER, LYOPHILIZED, FOR SOLUTION INTRAVENOUS at 18:30

## 2022-04-16 RX ADMIN — AMIODARONE HYDROCHLORIDE PRN MLS/HR: 50 INJECTION, SOLUTION INTRAVENOUS at 10:31

## 2022-04-16 RX ADMIN — FLUTICASONE PROPIONATE SCH SPRAY: 50 SPRAY, METERED NASAL at 21:02

## 2022-04-16 RX ADMIN — FLUTICASONE PROPIONATE SCH SPRAY: 50 SPRAY, METERED NASAL at 08:01

## 2022-04-16 RX ADMIN — DICLOFENAC SODIUM SCH APP: 10 GEL TOPICAL at 21:03

## 2022-04-16 RX ADMIN — DICLOFENAC SODIUM SCH APP: 10 GEL TOPICAL at 08:03

## 2022-04-16 RX ADMIN — Medication SCH CAP: at 07:59

## 2022-04-16 RX ADMIN — FUROSEMIDE SCH MG: 40 TABLET ORAL at 08:00

## 2022-04-16 RX ADMIN — Medication SCH CAP: at 21:02

## 2022-04-16 RX ADMIN — TAMSULOSIN HYDROCHLORIDE SCH MG: 0.4 CAPSULE ORAL at 09:00

## 2022-04-16 RX ADMIN — HEPARIN SODIUM PRN MLS/HR: 10000 INJECTION, SOLUTION INTRAVENOUS at 11:04

## 2022-04-16 RX ADMIN — TAMSULOSIN HYDROCHLORIDE SCH MG: 0.4 CAPSULE ORAL at 21:02

## 2022-04-16 RX ADMIN — PIPERACILLIN SODIUM AND TAZOBACTAM SODIUM SCH MLS/HR: 3; .375 INJECTION, POWDER, LYOPHILIZED, FOR SOLUTION INTRAVENOUS at 05:30

## 2022-04-16 RX ADMIN — METOPROLOL SUCCINATE SCH MG: 25 TABLET, EXTENDED RELEASE ORAL at 07:34

## 2022-04-16 RX ADMIN — ATORVASTATIN CALCIUM SCH MG: 20 TABLET, FILM COATED ORAL at 21:03

## 2022-04-16 RX ADMIN — MORPHINE SULFATE PRN MG: 2 INJECTION, SOLUTION INTRAMUSCULAR; INTRAVENOUS at 05:30

## 2022-04-16 NOTE — PDOC
PROGRESS NOTES


Date of Service


DATE: 4/16/22 


TIME: 12:53


Assessment


Problems


Medical Problems:


(1) Bilateral pleural effusion


Status: Acute  





(2) Congestive heart failure (CHF)


Status: Acute  





(3) Shortness of breath


Status: Acute  





Event on 4/14 was Cheyne-Schroeder respiration from his heart failure and 

hypotension.  Add to this his history of sleep apnea.  Also interesting is his 

history of nightmares and sleep issues, he says that he has had trouble like 

this before when he is falling asleep.  I do not see any evidence that he has 

had a stroke, transient ischemic attack, or seizure.  He has been fully worked 

up for this in the past.  No additional spells








Plan


No additional neurological studies needed


Treatment of medical issues.


Agree with cardiology plans for catheterization on 4/18


Subjective


Denies pain


Objective





Vital Signs








  Date Time  Temp Pulse Resp B/P (MAP) Pulse Ox O2 Delivery O2 Flow Rate FiO2


 


4/16/22 12:00 98.8 104 24 117/52 100 Nasal Cannula 2.0 





 98.8       














Intake and Output 


 


 4/16/22





 07:00


 


Intake Total 989 ml


 


Output Total 0 ml


 


Balance 989 ml


 


 


 


Intake Oral 120 ml


 


IV Total 869 ml


 


Output Urine Total 0 ml








PHYSICAL EXAM


Alert. Oriented to time, place and person.


PERRL.


EOMI.


CN: no focal findings.


Muscle tone: normal.


Muscle strength: 5/5


DTR: 2+


Plantar reflex: Flexor


Gait: not examined in bed.


Sensory exam: no abnormal findings.


No cerebellar signs elicited.


Review of Relevant


I have reviewed the following items ophelia (where applicable) has been applied.


Labs





Laboratory Tests








Test


 4/14/22


22:27 4/14/22


23:15 4/15/22


02:10 4/15/22


02:45


 


Glucose (Fingerstick)


 161 mg/dL


(70-99) 


 


 





 


Sodium Level


 


 135 mmol/L


(136-145) 


 136 mmol/L


(136-145)


 


Potassium Level


 


 3.7 mmol/L


(3.5-5.1) 


 4.3 mmol/L


(3.5-5.1)


 


Chloride Level


 


 100 mmol/L


() 


 101 mmol/L


()


 


Carbon Dioxide Level


 


 27 mmol/L


(21-32) 


 23 mmol/L


(21-32)


 


Anion Gap  8 (6-14)   12 (6-14) 


 


Blood Urea Nitrogen


 


 36 mg/dL


(8-26) 


 39 mg/dL


(8-26)


 


Creatinine


 


 1.7 mg/dL


(0.7-1.3) 


 1.8 mg/dL


(0.7-1.3)


 


Estimated GFR


(Cockcroft-Gault) 


 39.3 


 


 36.8 





 


Glucose Level


 


 190 mg/dL


(70-99) 


 181 mg/dL


(70-99)


 


Lactic Acid Level


 


 2.8 mmol/L


(0.4-2.0) 


 2.1 mmol/L


(0.4-2.0)


 


Calcium Level


 


 8.8 mg/dL


(8.5-10.1) 


 8.4 mg/dL


(8.5-10.1)


 


Troponin I High Sensitivity


 


 79 ng/L (4-75) 


 


 616 ng/L


(4-75)


 


O2 Saturation   98 % (92-99)  


 


Arterial Blood pH


 


 


 7.47


(7.35-7.45) 





 


Arterial Blood pCO2 at


Patient Temp 


 


 30 mmHg


(35-46) 





 


Arterial Blood pO2 at Patient


Temp 


 


 127 mmHg


() 





 


Arterial Blood HCO3


 


 


 21 mmol/L


(21-28) 





 


Arterial Blood Base Excess


 


 


 -2 mmol/L


(-3-3) 





 


FiO2   40 (5l nc)  


 


White Blood Count


 


 


 


 13.6 x10^3/uL


(4.0-11.0)


 


Red Blood Count


 


 


 


 3.28 x10^6/uL


(4.30-5.70)


 


Hemoglobin


 


 


 


 8.8 g/dL


(13.0-17.5)


 


Hematocrit


 


 


 


 27.4 %


(39.0-53.0)


 


Mean Corpuscular Volume    84 fL () 


 


Mean Corpuscular Hemoglobin    27 pg (25-35) 


 


Mean Corpuscular Hemoglobin


Concent 


 


 


 32 g/dL


(31-37)


 


Red Cell Distribution Width


 


 


 


 16.2 %


(11.5-14.5)


 


Platelet Count


 


 


 


 286 x10^3/uL


(140-400)


 


Neutrophils (%) (Auto)    88 % (31-73) 


 


Lymphocytes (%) (Auto)    5 % (24-48) 


 


Monocytes (%) (Auto)    7 % (0-9) 


 


Eosinophils (%) (Auto)    0 % (0-3) 


 


Basophils (%) (Auto)    0 % (0-3) 


 


Neutrophils # (Auto)


 


 


 


 11.9 x10^3/uL


(1.8-7.7)


 


Lymphocytes # (Auto)


 


 


 


 0.7 x10^3/uL


(1.0-4.8)


 


Monocytes # (Auto)


 


 


 


 0.9 x10^3/uL


(0.0-1.1)


 


Eosinophils # (Auto)


 


 


 


 0.0 x10^3/uL


(0.0-0.7)


 


Basophils # (Auto)


 


 


 


 0.0 x10^3/uL


(0.0-0.2)


 


Magnesium Level


 


 


 


 2.2 mg/dL


(1.8-2.4)


 


NT-Pro-B-Type Natriuretic


Peptide 


 


 


 42400 pg/mL


(0-449)


 


Procalcitonin


 


 


 


 0.74 ng/mL


(0.00-0.10)


 


Test


 4/15/22


10:59 4/15/22


16:00 4/15/22


22:05 4/16/22


05:25


 


Activated Partial


Thromboplast Time 36 SEC (24-38) 


 37 SEC (24-38) 


 35 SEC (24-38) 


 35 SEC (24-38) 





 


Troponin I High Sensitivity


 95449 ng/L


(4-75) 


 


 





 


White Blood Count


 


 


 


 10.2 x10^3/uL


(4.0-11.0)


 


Red Blood Count


 


 


 


 3.14 x10^6/uL


(4.30-5.70)


 


Hemoglobin


 


 


 


 8.4 g/dL


(13.0-17.5)


 


Hematocrit


 


 


 


 25.9 %


(39.0-53.0)


 


Mean Corpuscular Volume    82 fL () 


 


Mean Corpuscular Hemoglobin    27 pg (25-35) 


 


Mean Corpuscular Hemoglobin


Concent 


 


 


 32 g/dL


(31-37)


 


Red Cell Distribution Width


 


 


 


 16.1 %


(11.5-14.5)


 


Platelet Count


 


 


 


 243 x10^3/uL


(140-400)


 


Neutrophils (%) (Auto)    72 % (31-73) 


 


Lymphocytes (%) (Auto)    13 % (24-48) 


 


Monocytes (%) (Auto)    11 % (0-9) 


 


Eosinophils (%) (Auto)    3 % (0-3) 


 


Basophils (%) (Auto)    1 % (0-3) 


 


Neutrophils # (Auto)


 


 


 


 7.3 x10^3/uL


(1.8-7.7)


 


Lymphocytes # (Auto)


 


 


 


 1.4 x10^3/uL


(1.0-4.8)


 


Monocytes # (Auto)


 


 


 


 1.1 x10^3/uL


(0.0-1.1)


 


Eosinophils # (Auto)


 


 


 


 0.3 x10^3/uL


(0.0-0.7)


 


Basophils # (Auto)


 


 


 


 0.1 x10^3/uL


(0.0-0.2)


 


Sodium Level


 


 


 


 137 mmol/L


(136-145)


 


Potassium Level


 


 


 


 3.4 mmol/L


(3.5-5.1)


 


Chloride Level


 


 


 


 102 mmol/L


()


 


Carbon Dioxide Level


 


 


 


 26 mmol/L


(21-32)


 


Anion Gap    9 (6-14) 


 


Blood Urea Nitrogen


 


 


 


 40 mg/dL


(8-26)


 


Creatinine


 


 


 


 1.6 mg/dL


(0.7-1.3)


 


Estimated GFR


(Cockcroft-Gault) 


 


 


 42.1 





 


Glucose Level


 


 


 


 132 mg/dL


(70-99)


 


Calcium Level


 


 


 


 8.3 mg/dL


(8.5-10.1)


 


Magnesium Level


 


 


 


 2.1 mg/dL


(1.8-2.4)








Laboratory Tests








Test


 4/15/22


16:00 4/15/22


22:05 4/16/22


05:25


 


Activated Partial


Thromboplast Time 37 SEC (24-38) 


 35 SEC (24-38) 


 35 SEC (24-38) 





 


White Blood Count


 


 


 10.2 x10^3/uL


(4.0-11.0)


 


Red Blood Count


 


 


 3.14 x10^6/uL


(4.30-5.70)


 


Hemoglobin


 


 


 8.4 g/dL


(13.0-17.5)


 


Hematocrit


 


 


 25.9 %


(39.0-53.0)


 


Mean Corpuscular Volume   82 fL () 


 


Mean Corpuscular Hemoglobin   27 pg (25-35) 


 


Mean Corpuscular Hemoglobin


Concent 


 


 32 g/dL


(31-37)


 


Red Cell Distribution Width


 


 


 16.1 %


(11.5-14.5)


 


Platelet Count


 


 


 243 x10^3/uL


(140-400)


 


Neutrophils (%) (Auto)   72 % (31-73) 


 


Lymphocytes (%) (Auto)   13 % (24-48) 


 


Monocytes (%) (Auto)   11 % (0-9) 


 


Eosinophils (%) (Auto)   3 % (0-3) 


 


Basophils (%) (Auto)   1 % (0-3) 


 


Neutrophils # (Auto)


 


 


 7.3 x10^3/uL


(1.8-7.7)


 


Lymphocytes # (Auto)


 


 


 1.4 x10^3/uL


(1.0-4.8)


 


Monocytes # (Auto)


 


 


 1.1 x10^3/uL


(0.0-1.1)


 


Eosinophils # (Auto)


 


 


 0.3 x10^3/uL


(0.0-0.7)


 


Basophils # (Auto)


 


 


 0.1 x10^3/uL


(0.0-0.2)


 


Sodium Level


 


 


 137 mmol/L


(136-145)


 


Potassium Level


 


 


 3.4 mmol/L


(3.5-5.1)


 


Chloride Level


 


 


 102 mmol/L


()


 


Carbon Dioxide Level


 


 


 26 mmol/L


(21-32)


 


Anion Gap   9 (6-14) 


 


Blood Urea Nitrogen


 


 


 40 mg/dL


(8-26)


 


Creatinine


 


 


 1.6 mg/dL


(0.7-1.3)


 


Estimated GFR


(Cockcroft-Gault) 


 


 42.1 





 


Glucose Level


 


 


 132 mg/dL


(70-99)


 


Calcium Level


 


 


 8.3 mg/dL


(8.5-10.1)


 


Magnesium Level


 


 


 2.1 mg/dL


(1.8-2.4)








Microbiology


4/14/22 Gram Stain - Final, Resulted


          


4/14/22 Aerobic and Anaerobic Culture - Preliminary, Resulted


Medications





Current Medications


Aspirin (Aspirin Chewable) 324 mg 1X  ONCE PO  Last administered on 4/12/22at 

18:30;  Start 4/12/22 at 18:15;  Stop 4/12/22 at 18:16;  Status DC


Albuterol/ Ipratropium (Duoneb) 3 ml 1X  ONCE NEB  Last administered on 

4/12/22at 20:08;  Start 4/12/22 at 19:45;  Stop 4/12/22 at 19:46;  Status DC


Iohexol (Omnipaque 350 Mg/ml) 75 ml 1X  ONCE IV  Last administered on 4/12/22at 

19:45;  Start 4/12/22 at 19:45;  Stop 4/12/22 at 19:46;  Status DC


Acetaminophen (Tylenol) 650 mg PRN Q6HRS  PRN PO MILD PAIN / TEMP > 100.3'F;  

Start 4/12/22 at 21:15;  Stop 4/15/22 at 10:53;  Status DC


Ondansetron HCl (Zofran) 4 mg PRN Q4HRS  PRN IVP NAUSEA/VOMITING 1st choice;  

Start 4/12/22 at 21:15


Guaifenesin (Robitussin Dm) 10 ml PRN Q6HRS  PRN PO COUGH;  Start 4/12/22 at 

21:15


Furosemide (Lasix) 40 mg 1X  ONCE IVP  Last administered on 4/12/22at 21:30;  

Start 4/12/22 at 21:30;  Stop 4/12/22 at 21:31;  Status DC


Morphine Sulfate (Morphine Sulfate) 2 mg PRN Q6HRS  PRN IVP SEVERE PAIN 7-10 

Last administered on 4/16/22at 05:30;  Start 4/13/22 at 00:00


Sennosides (Senna) 17.2 mg PRN BID  PRN PO CONSTIPATION;  Start 4/13/22 at 09:00


Docusate Sodium (Colace) 100 mg PRN DAILY  PRN PO HARD STOOLS;  Start 4/13/22 at

09:00


Ondansetron HCl (Zofran) 4 mg PRN Q6HRS  PRN IVP NAUSEA/VOMITING, 1st CHOICE;  

Start 4/13/22 at 09:00


Dextrose (Dextrose 50%-Water Syringe) 12.5 gm PRN Q15MIN  PRN IV SEE COMMENTS;  

Start 4/13/22 at 09:00


Acetaminophen (Tylenol) 650 mg PRN Q4HRS  PRN PO TEMP OVER 100.4F OR MILD PAIN; 

Start 4/13/22 at 09:00


Lorazepam (Ativan) 0.5 mg PRN Q6HRS  PRN PO ANXIETY / AGITATION;  Start 4/13/22 

at 09:00


Lorazepam (Ativan Inj) 0.25 mg PRN Q4HRS  PRN IV ANXIETY / AGITATION;  Start 

4/13/22 at 09:00


Enoxaparin Sodium (Lovenox 40mg Syringe) 40 mg Q24H SQ ;  Start 4/13/22 at 

09:00;  Stop 4/13/22 at 11:01;  Status DC


Prochlorperazine Edisylate (Compazine) 10 mg PRN Q6HRS  PRN IV NAUSEA/VOMITING, 

2nd CHOICE;  Start 4/13/22 at 09:00


Diphenhydramine HCl (Benadryl) 25 mg PRN Q6HRS  PRN IVP ITCHING;  Start 4/13/22 

at 09:00


Diphenhydramine HCl (Benadryl) 25 mg PRN Q6HRS  PRN PO ITCHING;  Start 4/13/22 

at 09:00


Diphenhydramine HCl (Benadryl) 25 mg PRN QHS  PRN PO INSOMNIA, 1st CHOICE Last 

administered on 4/15/22at 21:20;  Start 4/13/22 at 09:00


Zolpidem Tartrate (Ambien) 2.5 mg PRN QHS  PRN PO INSOMNIA, 2nd CHOICE;  Start 

4/13/22 at 09:00


Atorvastatin Calcium (Lipitor) 40 mg HS PO  Last administered on 4/15/22at 

21:20;  Start 4/13/22 at 21:00


Metoprolol Succinate (Toprol Xl) 25 mg DAILY PO  Last administered on 4/14/22at 

08:23;  Start 4/13/22 at 10:30


Diclofenac Sodium (Voltaren) 1 jatin BID TP  Last administered on 4/16/22at 08:03;

 Start 4/13/22 at 11:00


Furosemide (Lasix) 40 mg 1X  ONCE IVP  Last administered on 4/13/22at 11:33;  

Start 4/13/22 at 11:30;  Stop 4/13/22 at 11:31;  Status DC


Potassium Chloride (Klor-Con) 20 meq 1X  ONCE PO  Last administered on 4/13/22at

11:33;  Start 4/13/22 at 11:30;  Stop 4/13/22 at 11:31;  Status DC


Fluticasone Propionate (Flonase) 1 spray BID NS  Last administered on 4/16/22at 

08:01;  Start 4/13/22 at 21:00


Tamsulosin HCl (Flomax) 0.4 mg BID PO  Last administered on 4/16/22at 09:00;  

Start 4/13/22 at 21:00


Ceftriaxone Sodium (Rocephin) 1 gm Q24H IVP  Last administered on 4/14/22at 

12:44;  Start 4/14/22 at 13:00;  Stop 4/15/22 at 07:17;  Status DC


Apixaban (Eliquis) 5 mg BID PO ;  Start 4/14/22 at 21:00;  Stop 4/15/22 at 

10:52;  Status DC


Furosemide (Lasix) 40 mg DAILY PO  Last administered on 4/16/22at 08:00;  Start 

4/14/22 at 14:00


Info (Anti-Coagulation Monitoring By Pharmacy) 1 each PRN DAILY  PRN MC PER 

PROTOCOL;  Start 4/14/22 at 14:15


Lactobacillus Rhamnosus (Culturelle) 1 cap BID PO  Last administered on 

4/16/22at 07:59;  Start 4/14/22 at 21:00


Norepinephrine Bitartrate 8 mg/ Dextrose 258 ml @  11.262 mls/ hr CONT  PRN IV 

PER PROTOCOL Last administered on 4/16/22at 10:31;  Start 4/15/22 at 00:00


Heparin Sodium/ Dextrose 250 ml @  6.984 mls/ hr CONT  PRN IV PER PROTOCOL;  

Start 4/15/22 at 04:15;  Stop 4/15/22 at 04:28;  Status DC


Heparin Sodium (Porcine) (Heparin Sodium) 1,450 unit PRN Q6HRS  PRN IV FOR UFH 

LEVEL LESS THAN 0.2;  Start 4/15/22 at 04:15;  Stop 4/15/22 at 04:28;  Status DC


Heparin Sodium/ Dextrose 250 ml @  6.984 mls/ hr CONT  PRN IV PER PROTOCOL Last 

administered on 4/16/22at 11:04;  Start 4/15/22 at 04:30


Heparin Sodium (Porcine) (Heparin Sodium) 1,450 unit PRN Q6HRS  PRN IV FOR PTT <

40 Last administered on 4/15/22at 23:24;  Start 4/15/22 at 04:30


Piperacillin Sod/ Tazobactam Sod (Zosyn Per Pharmacy) 1 each PRN DAILY  PRN MC S

EE COMMENTS;  Start 4/15/22 at 07:30


Piperacillin Sod/ Tazobactam Sod 3.375 gm/Sodium Chloride 50 ml @  100 mls/hr 

Q6HRS IV  Last administered on 4/16/22at 11:39;  Start 4/15/22 at 07:30


Dobutamine HCl/ Dextrose 250 ml @  4.073 mls/ hr CONT  PRN IV SEE I/O RECORD;  

Start 4/15/22 at 10:30


Lidocaine HCl (Xylocaine-Mpf 1% 2ml Vial) 2 ml STK-MED ONCE .ROUTE ;  Start 

4/15/22 at 12:13;  Stop 4/15/22 at 12:13;  Status DC


Iodixanol (Visipaque 320) 100 ml STK-MED ONCE .ROUTE ;  Start 4/15/22 at 12:13; 

Stop 4/15/22 at 12:13;  Status DC


Heparin Sodium/ Sodium Chloride 1,500 ml @  As Directed STK-MED ONCE .ROUTE ;  

Start 4/15/22 at 12:13;  Stop 4/15/22 at 12:14;  Status DC


Fentanyl Citrate (Fentanyl 2ml Vial) 100 mcg STK-MED ONCE .ROUTE ;  Start 

4/15/22 at 12:40;  Stop 4/15/22 at 12:40;  Status DC


Midazolam HCl (Versed) 2 mg STK-MED ONCE .ROUTE ;  Start 4/15/22 at 12:40;  Stop

4/15/22 at 12:40;  Status DC


Heparin Sodium (Porcine) (Heparin Sodium) 10,000 unit STK-MED ONCE .ROUTE ;  

Start 4/15/22 at 12:40;  Stop 4/15/22 at 12:40;  Status DC


Verapamil HCl (Verapamil) 5 mg STK-MED ONCE .ROUTE ;  Start 4/15/22 at 12:40;  

Stop 4/15/22 at 12:40;  Status DC


Nitroglycerin (Nitroglycerin) 200 mcg STK-MED ONCE .ROUTE ;  Start 4/15/22 at 

12:40;  Stop 4/15/22 at 12:41;  Status DC


Nitroglycerin (Nitroglycerin) 200 mcg 1X  ONCE IART  Last administered on 

4/15/22at 13:39;  Start 4/15/22 at 13:00;  Stop 4/15/22 at 13:01;  Status DC


Verapamil HCl (Verapamil) 2.5 mg 1X  ONCE IART  Last administered on 4/15/22at 

13:40;  Start 4/15/22 at 13:00;  Stop 4/15/22 at 13:01;  Status DC


Heparin Sodium (Porcine) (Heparin Sodium) 2,500 unit 1X  ONCE IART  Last 

administered on 4/15/22at 13:38;  Start 4/15/22 at 13:00;  Stop 4/15/22 at 

13:01;  Status DC


Heparin Sodium/ Sodium Chloride (HEPARIN for ARTERIAL LINE FLUSH) 1,000 unit 1X 

ONCE IART  Last administered on 4/15/22at 13:33;  Start 4/15/22 at 13:00;  Stop 

4/15/22 at 13:01;  Status DC


Heparin Sodium/ Sodium Chloride (HEPARIN for ARTERIAL LINE FLUSH) 1,000 unit 1X 

ONCE IART  Last administered on 4/15/22at 13:33;  Start 4/15/22 at 13:00;  Stop 

4/15/22 at 13:01;  Status DC


Iodixanol (Visipaque 320) 100 ml 1X  ONCE IART  Last administered on 4/15/22at 

13:33;  Start 4/15/22 at 13:00;  Stop 4/15/22 at 13:01;  Status DC


Lidocaine HCl (Xylocaine-Mpf 1% 2ml Vial) 2 ml 1X  ONCE INJ  Last administered 

on 4/15/22at 13:34;  Start 4/15/22 at 13:00;  Stop 4/15/22 at 13:01;  Status DC


Info (CONTRAST GIVEN -- Rx MONITORING) 1 each PRN DAILY  PRN MC SEE COMMENTS;  

Start 4/15/22 at 13:00;  Stop 4/17/22 at 12:59


Lidocaine HCl (Lidocaine 1% 20ml Vial) 20 ml STK-MED ONCE .ROUTE ;  Start 

4/15/22 at 13:06;  Stop 4/15/22 at 13:06;  Status DC


Heparin Sodium (Porcine) (Heparin 5,000 Units/1,000ml NS) 5,000 unit 1X  ONCE IV

 Last administered on 4/15/22at 13:15;  Start 4/15/22 at 13:15;  Stop 4/15/22 at

13:16;  Status DC


Lidocaine HCl (Lidocaine 1% 20ml Vial) 20 ml 1X  ONCE INJ  Last administered on 

4/15/22at 13:45;  Start 4/15/22 at 13:45;  Stop 4/15/22 at 13:46;  Status DC


Potassium Chloride (Klor-Con) 40 meq 1X  ONCE PO  Last administered on 4/16/22at

08:00;  Start 4/16/22 at 07:30;  Stop 4/16/22 at 07:32;  Status DC


Clopidogrel Bisulfate (Plavix) 75 mg DAILYWBKFT PO ;  Start 4/17/22 at 08:00


Clopidogrel Bisulfate (Plavix) 300 mg 1X  ONCE PO  Last administered on 

4/16/22at 10:49;  Start 4/16/22 at 10:45;  Stop 4/16/22 at 10:46;  Status DC





Active Scripts


Active


Furosemide 40 Mg Tablet 1 Tab PO DAILY 30 Days


Metoprolol Succinate ( Xl ) (Metoprolol Succinate) 25 Mg Tab.er.24h 25 Mg PO 

DAILY 30 Days


Eliquis (Apixaban) 5 Mg Tablet 5 Mg PO BID 30 Days


Reported


Stiolto Respimat Inhal Spray (Tiotropium Br/Olodaterol HCl) 4 Gm Mist.inhal 4 Gm

IH HS


Flomax (Tamsulosin Hcl) 0.4 Mg Cap.er.24h 1 Cap PO BID


Refresh Lacri-Lube Ointment (Mineral Oil/Petrolatum,White) 3.5 Gm Oint...g. 3.5 

Gm OU DAILY


Atorvastatin Calcium 20 Mg Tablet 2 Tab PO HS


Omeprazole 20 Mg Capsule.dr 1 Cap PO DAILY


Lubricating Plus (Carboxymethylcellulose Sodium) 1 Each Droperette 1 OU DAILY


Proair Hfa Inhaler (Albuterol Sulfate) 8.5 Gm Hfa.aer.ad 1 Puff INH PRN Q6HRS 

PRN


Aspirin 325 Mg Tablet 1 Tab PO DAILY


Vitals/I & O





Vital Sign - Last 24 Hours








 4/15/22 4/15/22 4/15/22 4/15/22





 13:00 13:40 13:56 14:00


 


Pulse 108 92 90 90


 


Resp 20  23 18


 


B/P (MAP) 96/63 (74) 159/110  137/81 (99)


 


Pulse Ox 100  100 100


 


O2 Delivery Nasal Cannula  Nasal Cannula Nasal Cannula


 


O2 Flow Rate 3.0  3.0 3.0





 4/15/22 4/15/22 4/15/22 4/15/22





 14:00 14:00 15:00 15:00


 


Pulse 101 115 98 101


 


Resp 20 18 18 20


 


B/P (MAP) 158/57 150/71 (97) 158/87 (110) 158/87


 


Pulse Ox 100 95 100 100


 


O2 Delivery Nasal Cannula Nasal Cannula Nasal Cannula Nasal Cannula


 


O2 Flow Rate 3.0 3.0 3.0 3.0





 4/15/22 4/15/22 4/15/22 4/15/22





 16:00 16:00 16:00 16:15


 


Pulse 92  92 92


 


Resp 20  18 20


 


B/P (MAP) 147/79  147/79 (101) 145/78


 


Pulse Ox 100  100 100


 


O2 Delivery Nasal Cannula Nasal Cannula Nasal Cannula Nasal Cannula


 


O2 Flow Rate 3.0 3.0 3.0 3.0





 4/15/22 4/15/22 4/15/22 4/15/22





 16:15 16:30 16:30 17:00


 


Pulse 92 92 100 92


 


Resp 18 18 20 18


 


B/P (MAP) 147/79 (101) 147/79 (101) 152/81 147/79 (101)


 


Pulse Ox 100 100 100 100


 


O2 Delivery Nasal Cannula Nasal Cannula Nasal Cannula Nasal Cannula


 


O2 Flow Rate 3.0 3.0 3.0 3.0





 4/15/22 4/15/22 4/15/22 4/15/22





 17:00 18:00 18:00 18:15


 


Pulse 104 92 99 105


 


Resp 12 18 16 18


 


B/P (MAP) 152/85 147/79 (101) 124/63 139/67


 


Pulse Ox 100 100 100 100


 


O2 Delivery Nasal Cannula Nasal Cannula Nasal Cannula Nasal Cannula


 


O2 Flow Rate 3.0 3.0 3.0 3.0





 4/15/22 4/15/22 4/15/22 4/15/22





 19:00 20:00 20:00 21:00


 


Temp 97.5  98.4 





 97.5  98.4 


 


Pulse 105  97 92


 


Resp 38  17 14


 


B/P (MAP) 138/71  132/60 123/60


 


Pulse Ox 100  100 100


 


O2 Delivery Nasal Cannula Nasal Cannula Nasal Cannula Nasal Cannula


 


O2 Flow Rate 3.0 3.0 3.0 3.0


 


    





    





 4/15/22 4/15/22 4/16/22 4/16/22





 22:00 23:00 00:00 00:00


 


Temp    98.2





    98.2


 


Pulse 98 97  100


 


Resp 22 16  15


 


B/P (MAP) 124/66 117/61  117/64


 


Pulse Ox 100 93  98


 


O2 Delivery Nasal Cannula Nasal Cannula Nasal Cannula Nasal Cannula


 


O2 Flow Rate 3.0 3.0 3.0 3.0


 


    





    





 4/16/22 4/16/22 4/16/22 4/16/22





 01:00 02:00 02:39 03:00


 


Pulse 87 99  98


 


Resp 14 10  19


 


B/P (MAP) 127/63 118/69  118/64


 


Pulse Ox 100 100 99 98


 


O2 Delivery Nasal Cannula Nasal Cannula Nasal Cannula Nasal Cannula


 


O2 Flow Rate 3.0 3.0 2.0 3.0





 4/16/22 4/16/22 4/16/22 4/16/22





 04:00 04:00 05:00 05:30


 


Temp 98.2   





 98.2   


 


Pulse 90  94 


 


Resp 15  19 30


 


B/P (MAP) 124/62  122/67 


 


Pulse Ox 100  99 100


 


O2 Delivery Nasal Cannula Nasal Cannula Nasal Cannula Nasal Cannula


 


O2 Flow Rate 3.0 3.0 3.0 3.0


 


    





    





 4/16/22 4/16/22 4/16/22 4/16/22





 06:00 06:00 07:00 08:00


 


Pulse  87 84 


 


Resp 12 12 16 


 


B/P (MAP)  136/75 110/57 


 


Pulse Ox 100 100 100 


 


O2 Delivery Nasal Cannula Nasal Cannula Nasal Cannula Nasal Cannula


 


O2 Flow Rate 3.0 3.0 3.0 2.0





 4/16/22 4/16/22 4/16/22 4/16/22





 08:00 09:00 09:30 10:00


 


Temp 98.5   





 98.5   


 


Pulse 90 101 111 107


 


Resp 16 16 22 19


 


B/P (MAP) 108/53 135/81 128/73 131/74


 


Pulse Ox 99 98 100 100


 


O2 Delivery Nasal Cannula Nasal Cannula Nasal Cannula Nasal Cannula


 


O2 Flow Rate 3.0 2.0 2.0 2.0


 


    





    





 4/16/22 4/16/22 4/16/22 





 11:00 12:00 12:00 


 


Temp   98.8 





   98.8 


 


Pulse 100  104 


 


Resp 20  24 


 


B/P (MAP) 124/64  117/52 


 


Pulse Ox 100  100 


 


O2 Delivery Nasal Cannula Nasal Cannula Nasal Cannula 


 


O2 Flow Rate 2.0 2.0 2.0 














Intake and Output   


 


 4/15/22 4/15/22 4/16/22





 15:00 23:00 07:00


 


Intake Total 0 ml 170 ml 819 ml


 


Output Total  0 ml 0 ml


 


Balance 0 ml 170 ml 819 ml











Justicifation of Admission Dx:


Justifications for Admission:


Justification of Admission Dx:  Yes











BELLA ALVAREZ MD           Apr 16, 2022 12:55

## 2022-04-16 NOTE — RAD
Exam performed: One view chest



HISTORY: Abnormal chest x-ray.



DATE OF SERVICE: 4/16/2022.



COMPARISON: One view chest from 4/15/2022.



Portable view chest findings and 

impression:



Heart size and mediastinal silhouette is stable. Pulmonary vascularity is congested. A right IJ centr
al line, similar in position. Ongoing bilateral pulmonary infiltrates are seen, slightly worsening in
 the left upper lobe. There is no pleural effusion or pneumothorax.



Electronically signed by: Danita Quintana MD (4/16/2022 4:11 PM) [facility]

## 2022-04-16 NOTE — PDOC
CARDIOLOGY PROGRESS NOTE


SUBJECTIVE:


Patient remains in good spirits.  He denies any current chest pain or dyspnea.  

I am unclear if he has insight into his severe cardiomyopathy and medical 

problems.





OBJECTIVE:


Vital Signs/I&O:





                                   Vital Signs








  Date Time  Temp Pulse Resp B/P (MAP) Pulse Ox O2 Delivery O2 Flow Rate FiO2


 


4/16/22 10:00  107 19 131/74 100 Nasal Cannula 2.0 


 


4/16/22 08:00 98.5       





 98.5       














                                    I & O   


 


 4/15/22 4/15/22 4/16/22





 15:00 23:00 07:00


 


Intake Total 0 ml 170 ml 819 ml


 


Output Total  0 ml 0 ml


 


Balance 0 ml 170 ml 819 ml








Objective:


GEN.:    No apparent distress.  Alert and oriented.


HEENT:    Head is normocephalic, atraumatic


NECK:    Supple.  


LUNGS:    Clear to auscultation.


HEART:    RRR, S1, S2 present.  Peripheral pulses intact


ABDOMEN:    Soft, nontender.  Positive bowel sounds.


EXTREMITIES:    Without any cyanosis.  Dopplerable pulses bilaterally    


NEUROLOGIC:     Normal speech, normal tone


PSYCHIATRIC:    Normal affect, normal mood.


SKIN:   No ulcerations


Right groin access site is clean, dry and intact.





CURRENT MEDICATIONS:


Heparin drip, Levophed drip, atorvastatin





DIAGNOSTIC TESTING:


Laboratory studies reviewed, creatinine 1.6 and stable


Hemoglobin 8.4


Labs:


                                Laboratory Tests


4/16/22 05:25











Laboratory Tests








Test


 4/15/22


10:59 4/15/22


16:00 4/15/22


22:05 4/16/22


05:25


 


Activated Partial


Thromboplast Time 36 SEC (24-38)


 37 SEC (24-38)


 35 SEC (24-38)


 35 SEC (24-38)





 


Troponin I High Sensitivity


 93951 ng/L


(4-75)  H 


 


 





 


White Blood Count


 


 


 


 10.2 x10^3/uL


(4.0-11.0)


 


Red Blood Count


 


 


 


 3.14 x10^6/uL


(4.30-5.70)  L


 


Hemoglobin


 


 


 


 8.4 g/dL


(13.0-17.5)  L


 


Hematocrit


 


 


 


 25.9 %


(39.0-53.0)  L


 


Mean Corpuscular Volume


 


 


 


 82 fL ()





 


Mean Corpuscular Hemoglobin    27 pg (25-35)  


 


Mean Corpuscular Hemoglobin


Concent 


 


 


 32 g/dL


(31-37)


 


Red Cell Distribution Width


 


 


 


 16.1 %


(11.5-14.5)  H


 


Platelet Count


 


 


 


 243 x10^3/uL


(140-400)


 


Neutrophils (%) (Auto)    72 % (31-73)  


 


Lymphocytes (%) (Auto)    13 % (24-48)  L


 


Monocytes (%) (Auto)    11 % (0-9)  H


 


Eosinophils (%) (Auto)    3 % (0-3)  


 


Basophils (%) (Auto)    1 % (0-3)  


 


Neutrophils # (Auto)


 


 


 


 7.3 x10^3/uL


(1.8-7.7)


 


Lymphocytes # (Auto)


 


 


 


 1.4 x10^3/uL


(1.0-4.8)


 


Monocytes # (Auto)


 


 


 


 1.1 x10^3/uL


(0.0-1.1)


 


Eosinophils # (Auto)


 


 


 


 0.3 x10^3/uL


(0.0-0.7)


 


Basophils # (Auto)


 


 


 


 0.1 x10^3/uL


(0.0-0.2)


 


Sodium Level


 


 


 


 137 mmol/L


(136-145)


 


Potassium Level


 


 


 


 3.4 mmol/L


(3.5-5.1)  L


 


Chloride Level


 


 


 


 102 mmol/L


()


 


Carbon Dioxide Level


 


 


 


 26 mmol/L


(21-32)


 


Anion Gap    9 (6-14)  


 


Blood Urea Nitrogen


 


 


 


 40 mg/dL


(8-26)  H


 


Creatinine


 


 


 


 1.6 mg/dL


(0.7-1.3)  H


 


Estimated GFR


(Cockcroft-Gault) 


 


 


 42.1  





 


Glucose Level


 


 


 


 132 mg/dL


(70-99)  H


 


Calcium Level


 


 


 


 8.3 mg/dL


(8.5-10.1)  L











ASSESSMENT:


1.  Ischemic cardiomyopathy with cardiogenic shock


2.  Non-STEMI


3.  Dyslipidemia


4.  Prior history of CVA


5.  Prior history of paroxysmal atrial fibrillation





PLAN:


1.  Continue heparin drip.  Continue present medications and try to wean 

Levophed.  Patient appears to be fairly euvolemic after insertion of balloon 

pump with good urine output and stable creatinine


2.  We will discussed with family regarding possibility of high risk PCI and the

risks and benefits.  I have already spoken to his primary cardiologist Dr. Ovalle.


3.  Initiate Plavix therapy and continue heparin.  After his PCI he could then 

be transition to Plavix and Eliquis.





Continue present therapy and we will follow along closely.





Justicifation of Admission Dx:


Justifications for Admission:


Justification of Admission Dx:  Yes











NELSON NGUYEN MD       Apr 16, 2022 10:30

## 2022-04-16 NOTE — PDOC
[facility] PROGRESS NOTE


Date of Service


DOS:


DATE: 4/16/22 


TIME: 14:28





Chief Complaint


Chief Complaint


Assessment/Plan


Acute hypoxic respiratory failure


Bilateral pleural effusions status post thoracentesis 4/14/2022


Acute on chronic CHF exacerbation


Costochondritis


History of CHF with LVEF of 15 to 20% on echo 2/7/2022


History of CAD


History of MIRIAM


History of dyslipidemia


History of hypertension


History of atrial fibrillation on Eliquis








Admit to hospitalist service for further management


Cardiology consult for CHF management


Pulmonology consult for possible thoracentesis


Hold anticoagulation for now patient's chart, labs, images were reviewed and 

discussed with RN


Fluid and sodium restriction


Lovenox for DVT prophylaxis


Protonix GI prophylaxis


ADA diet


CODE STATUS full


Discussed with RN and SW


Disposition inpatient management as above


DPOA: Brisa Deborah





History of Present Illness


History of Present Illness


76-year-old male with multiple comorbidities who comes in with shortness of 

breath and chest pain.  Shortness of breath has been going on for for few 

days.Patient's room air sat while I was in the room was 92% but he is quite 

tachypneic and has to stop frequently during the question-and-answer session to 

catch his breath.  Denies any fevers, abdominal pain, diarrhea, dysuria or 

syncope.





4/14/2022


No acute events overnight.  Patient seen examined bedside.  Saturating 99% on 2 

L nasal cannula.  Patient had thoracentesis done this morning with 800 cc of 

fluid removed.  Will await for fluid analysis.  Patient's chart, labs, images 

were reviewed and discussed with RN





4/15: Afebrile.  S/P bilateral thoracentesis yesterday.  Patient was moved down 

to the ICU after having apneic event.  Code stroke was was called and neurology 

consulted.  No concerns for CVA, per neurology.  Patient will be taken to the 

Cath Lab today to have been intra-aortic balloon pump placed.  Discussed with 

RN.





4/16: Patient seen in ICU.  He has no complaints of chest pain, appears to be in

good spirits.  He is scheduled for high risk PCI on Monday; discussed with son 

at bedside.  He remains on heparin gtt and Levophed.





Vitals/I&O


Vitals/I&O:





                                   Vital Signs








  Date Time  Temp Pulse Resp B/P (MAP) Pulse Ox O2 Delivery O2 Flow Rate FiO2


 


4/16/22 14:00  110 22 125/60 100 Nasal Cannula 2.0 


 


4/16/22 12:00 98.8       





 98.8       














                                    I & O   


 


 4/15/22 4/15/22 4/16/22





 15:00 23:00 07:00


 


Intake Total 0 ml 170 ml 819 ml


 


Output Total  0 ml 0 ml


 


Balance 0 ml 170 ml 819 ml











Physical Exam


General:  Alert, Oriented X3, Cooperative, mild distress


Heart:  Other (sinus tachycardia )


Lungs:  Other (Decreased at the bases.)


Abdomen:  Soft, No tenderness


Extremities:  No edema


Skin:  No significant lesion





Labs


Labs:





Laboratory Tests








Test


 4/15/22


16:00 4/15/22


22:05 4/16/22


05:25


 


Activated Partial


Thromboplast Time 37 SEC (24-38) 


 35 SEC (24-38) 


 35 SEC (24-38) 





 


White Blood Count


 


 


 10.2 x10^3/uL


(4.0-11.0)


 


Red Blood Count


 


 


 3.14 x10^6/uL


(4.30-5.70)


 


Hemoglobin


 


 


 8.4 g/dL


(13.0-17.5)


 


Hematocrit


 


 


 25.9 %


(39.0-53.0)


 


Mean Corpuscular Volume   82 fL () 


 


Mean Corpuscular Hemoglobin   27 pg (25-35) 


 


Mean Corpuscular Hemoglobin


Concent 


 


 32 g/dL


(31-37)


 


Red Cell Distribution Width


 


 


 16.1 %


(11.5-14.5)


 


Platelet Count


 


 


 243 x10^3/uL


(140-400)


 


Neutrophils (%) (Auto)   72 % (31-73) 


 


Lymphocytes (%) (Auto)   13 % (24-48) 


 


Monocytes (%) (Auto)   11 % (0-9) 


 


Eosinophils (%) (Auto)   3 % (0-3) 


 


Basophils (%) (Auto)   1 % (0-3) 


 


Neutrophils # (Auto)


 


 


 7.3 x10^3/uL


(1.8-7.7)


 


Lymphocytes # (Auto)


 


 


 1.4 x10^3/uL


(1.0-4.8)


 


Monocytes # (Auto)


 


 


 1.1 x10^3/uL


(0.0-1.1)


 


Eosinophils # (Auto)


 


 


 0.3 x10^3/uL


(0.0-0.7)


 


Basophils # (Auto)


 


 


 0.1 x10^3/uL


(0.0-0.2)


 


Sodium Level


 


 


 137 mmol/L


(136-145)


 


Potassium Level


 


 


 3.4 mmol/L


(3.5-5.1)


 


Chloride Level


 


 


 102 mmol/L


()


 


Carbon Dioxide Level


 


 


 26 mmol/L


(21-32)


 


Anion Gap   9 (6-14) 


 


Blood Urea Nitrogen


 


 


 40 mg/dL


(8-26)


 


Creatinine


 


 


 1.6 mg/dL


(0.7-1.3)


 


Estimated GFR


(Cockcroft-Gault) 


 


 42.1 





 


Glucose Level


 


 


 132 mg/dL


(70-99)


 


Calcium Level


 


 


 8.3 mg/dL


(8.5-10.1)


 


Magnesium Level


 


 


 2.1 mg/dL


(1.8-2.4)











Assessment and Plan


Assessmemt and Plan


Problems


Medical Problems:


(1) Bilateral pleural effusion


Status: Acute  





(2) Congestive heart failure (CHF)


Status: Acute  





(3) Shortness of breath


Status: Acute  











Comment


Review of Relevant


I have reviewed the following items ophelia (where applicable) has been applied.


Medications:





Current Medications








 Medications


  (Trade)  Dose


 Ordered  Sig/Joie


 Route


 PRN Reason  Start Time


 Stop Time Status Last Admin


Dose Admin


 


 Potassium Chloride


  (Klor-Con)  40 meq  1X  ONCE


 PO


   4/16/22 07:30


 4/16/22 07:32 DC 4/16/22 08:00





 


 Clopidogrel


 Bisulfate


  (Plavix)  300 mg  1X  ONCE


 PO


   4/16/22 10:45


 4/16/22 10:46 DC 4/16/22 10:49














Justifications for Admission


Other Justification


CHF exacerbation











SANDY COVINGTON MD            Apr 16, 2022 14:31

## 2022-04-16 NOTE — PDOC
PULMONARY PROGRESS NOTES


DATE: 4/16/22 


TIME: 10:44


Subjective


Patient denies any shortness of breath.  Remains on nasal cannula.


Status post cardiac cath.  Severe three-vessel coronary artery disease and 

severe cardiomyopathy with an EF of 15%.  Status post intra-aortic balloon pump.

 


Status post bilateral thoracentesis at 4/14/2022


Vitals





Vital Signs








  Date Time  Temp Pulse Resp B/P (MAP) Pulse Ox O2 Delivery O2 Flow Rate FiO2


 


4/16/22 10:00  107 19 131/74 100 Nasal Cannula 2.0 


 


4/16/22 08:00 98.5       





 98.5       








General:  Alert, No acute distress


Lungs:  Other (Decreased at the bases.)


Cardiovascular:  S1, S2


Abdomen:  Soft, Non-tender


Extremities:  No Edema


Skin:  Warm


Labs





Laboratory Tests








Test


 4/14/22


22:27 4/14/22


23:15 4/15/22


02:10 4/15/22


02:45


 


Glucose (Fingerstick)


 161 mg/dL


(70-99) 


 


 





 


Sodium Level


 


 135 mmol/L


(136-145) 


 136 mmol/L


(136-145)


 


Potassium Level


 


 3.7 mmol/L


(3.5-5.1) 


 4.3 mmol/L


(3.5-5.1)


 


Chloride Level


 


 100 mmol/L


() 


 101 mmol/L


()


 


Carbon Dioxide Level


 


 27 mmol/L


(21-32) 


 23 mmol/L


(21-32)


 


Anion Gap  8 (6-14)   12 (6-14) 


 


Blood Urea Nitrogen


 


 36 mg/dL


(8-26) 


 39 mg/dL


(8-26)


 


Creatinine


 


 1.7 mg/dL


(0.7-1.3) 


 1.8 mg/dL


(0.7-1.3)


 


Estimated GFR


(Cockcroft-Gault) 


 39.3 


 


 36.8 





 


Glucose Level


 


 190 mg/dL


(70-99) 


 181 mg/dL


(70-99)


 


Lactic Acid Level


 


 2.8 mmol/L


(0.4-2.0) 


 2.1 mmol/L


(0.4-2.0)


 


Calcium Level


 


 8.8 mg/dL


(8.5-10.1) 


 8.4 mg/dL


(8.5-10.1)


 


Troponin I High Sensitivity


 


 79 ng/L (4-75) 


 


 616 ng/L


(4-75)


 


O2 Saturation   98 % (92-99)  


 


Arterial Blood pH


 


 


 7.47


(7.35-7.45) 





 


Arterial Blood pCO2 at


Patient Temp 


 


 30 mmHg


(35-46) 





 


Arterial Blood pO2 at Patient


Temp 


 


 127 mmHg


() 





 


Arterial Blood HCO3


 


 


 21 mmol/L


(21-28) 





 


Arterial Blood Base Excess


 


 


 -2 mmol/L


(-3-3) 





 


FiO2   40 (5l nc)  


 


White Blood Count


 


 


 


 13.6 x10^3/uL


(4.0-11.0)


 


Red Blood Count


 


 


 


 3.28 x10^6/uL


(4.30-5.70)


 


Hemoglobin


 


 


 


 8.8 g/dL


(13.0-17.5)


 


Hematocrit


 


 


 


 27.4 %


(39.0-53.0)


 


Mean Corpuscular Volume    84 fL () 


 


Mean Corpuscular Hemoglobin    27 pg (25-35) 


 


Mean Corpuscular Hemoglobin


Concent 


 


 


 32 g/dL


(31-37)


 


Red Cell Distribution Width


 


 


 


 16.2 %


(11.5-14.5)


 


Platelet Count


 


 


 


 286 x10^3/uL


(140-400)


 


Neutrophils (%) (Auto)    88 % (31-73) 


 


Lymphocytes (%) (Auto)    5 % (24-48) 


 


Monocytes (%) (Auto)    7 % (0-9) 


 


Eosinophils (%) (Auto)    0 % (0-3) 


 


Basophils (%) (Auto)    0 % (0-3) 


 


Neutrophils # (Auto)


 


 


 


 11.9 x10^3/uL


(1.8-7.7)


 


Lymphocytes # (Auto)


 


 


 


 0.7 x10^3/uL


(1.0-4.8)


 


Monocytes # (Auto)


 


 


 


 0.9 x10^3/uL


(0.0-1.1)


 


Eosinophils # (Auto)


 


 


 


 0.0 x10^3/uL


(0.0-0.7)


 


Basophils # (Auto)


 


 


 


 0.0 x10^3/uL


(0.0-0.2)


 


Magnesium Level


 


 


 


 2.2 mg/dL


(1.8-2.4)


 


NT-Pro-B-Type Natriuretic


Peptide 


 


 


 14963 pg/mL


(0-449)


 


Procalcitonin


 


 


 


 0.74 ng/mL


(0.00-0.10)


 


Test


 4/15/22


10:59 4/15/22


16:00 4/15/22


22:05 4/16/22


05:25


 


Activated Partial


Thromboplast Time 36 SEC (24-38) 


 37 SEC (24-38) 


 35 SEC (24-38) 


 35 SEC (24-38) 





 


Troponin I High Sensitivity


 01979 ng/L


(4-75) 


 


 





 


White Blood Count


 


 


 


 10.2 x10^3/uL


(4.0-11.0)


 


Red Blood Count


 


 


 


 3.14 x10^6/uL


(4.30-5.70)


 


Hemoglobin


 


 


 


 8.4 g/dL


(13.0-17.5)


 


Hematocrit


 


 


 


 25.9 %


(39.0-53.0)


 


Mean Corpuscular Volume    82 fL () 


 


Mean Corpuscular Hemoglobin    27 pg (25-35) 


 


Mean Corpuscular Hemoglobin


Concent 


 


 


 32 g/dL


(31-37)


 


Red Cell Distribution Width


 


 


 


 16.1 %


(11.5-14.5)


 


Platelet Count


 


 


 


 243 x10^3/uL


(140-400)


 


Neutrophils (%) (Auto)    72 % (31-73) 


 


Lymphocytes (%) (Auto)    13 % (24-48) 


 


Monocytes (%) (Auto)    11 % (0-9) 


 


Eosinophils (%) (Auto)    3 % (0-3) 


 


Basophils (%) (Auto)    1 % (0-3) 


 


Neutrophils # (Auto)


 


 


 


 7.3 x10^3/uL


(1.8-7.7)


 


Lymphocytes # (Auto)


 


 


 


 1.4 x10^3/uL


(1.0-4.8)


 


Monocytes # (Auto)


 


 


 


 1.1 x10^3/uL


(0.0-1.1)


 


Eosinophils # (Auto)


 


 


 


 0.3 x10^3/uL


(0.0-0.7)


 


Basophils # (Auto)


 


 


 


 0.1 x10^3/uL


(0.0-0.2)


 


Sodium Level


 


 


 


 137 mmol/L


(136-145)


 


Potassium Level


 


 


 


 3.4 mmol/L


(3.5-5.1)


 


Chloride Level


 


 


 


 102 mmol/L


()


 


Carbon Dioxide Level


 


 


 


 26 mmol/L


(21-32)


 


Anion Gap    9 (6-14) 


 


Blood Urea Nitrogen


 


 


 


 40 mg/dL


(8-26)


 


Creatinine


 


 


 


 1.6 mg/dL


(0.7-1.3)


 


Estimated GFR


(Cockcroft-Gault) 


 


 


 42.1 





 


Glucose Level


 


 


 


 132 mg/dL


(70-99)


 


Calcium Level


 


 


 


 8.3 mg/dL


(8.5-10.1)


 


Magnesium Level


 


 


 


 2.1 mg/dL


(1.8-2.4)








Laboratory Tests








Test


 4/15/22


10:59 4/15/22


16:00 4/15/22


22:05 4/16/22


05:25


 


Activated Partial


Thromboplast Time 36 SEC (24-38) 


 37 SEC (24-38) 


 35 SEC (24-38) 


 35 SEC (24-38) 





 


Troponin I High Sensitivity


 14341 ng/L


(4-75) 


 


 





 


White Blood Count


 


 


 


 10.2 x10^3/uL


(4.0-11.0)


 


Red Blood Count


 


 


 


 3.14 x10^6/uL


(4.30-5.70)


 


Hemoglobin


 


 


 


 8.4 g/dL


(13.0-17.5)


 


Hematocrit


 


 


 


 25.9 %


(39.0-53.0)


 


Mean Corpuscular Volume    82 fL () 


 


Mean Corpuscular Hemoglobin    27 pg (25-35) 


 


Mean Corpuscular Hemoglobin


Concent 


 


 


 32 g/dL


(31-37)


 


Red Cell Distribution Width


 


 


 


 16.1 %


(11.5-14.5)


 


Platelet Count


 


 


 


 243 x10^3/uL


(140-400)


 


Neutrophils (%) (Auto)    72 % (31-73) 


 


Lymphocytes (%) (Auto)    13 % (24-48) 


 


Monocytes (%) (Auto)    11 % (0-9) 


 


Eosinophils (%) (Auto)    3 % (0-3) 


 


Basophils (%) (Auto)    1 % (0-3) 


 


Neutrophils # (Auto)


 


 


 


 7.3 x10^3/uL


(1.8-7.7)


 


Lymphocytes # (Auto)


 


 


 


 1.4 x10^3/uL


(1.0-4.8)


 


Monocytes # (Auto)


 


 


 


 1.1 x10^3/uL


(0.0-1.1)


 


Eosinophils # (Auto)


 


 


 


 0.3 x10^3/uL


(0.0-0.7)


 


Basophils # (Auto)


 


 


 


 0.1 x10^3/uL


(0.0-0.2)


 


Sodium Level


 


 


 


 137 mmol/L


(136-145)


 


Potassium Level


 


 


 


 3.4 mmol/L


(3.5-5.1)


 


Chloride Level


 


 


 


 102 mmol/L


()


 


Carbon Dioxide Level


 


 


 


 26 mmol/L


(21-32)


 


Anion Gap    9 (6-14) 


 


Blood Urea Nitrogen


 


 


 


 40 mg/dL


(8-26)


 


Creatinine


 


 


 


 1.6 mg/dL


(0.7-1.3)


 


Estimated GFR


(Cockcroft-Gault) 


 


 


 42.1 





 


Glucose Level


 


 


 


 132 mg/dL


(70-99)


 


Calcium Level


 


 


 


 8.3 mg/dL


(8.5-10.1)


 


Magnesium Level


 


 


 


 2.1 mg/dL


(1.8-2.4)








Medications





Active Scripts








 Medications  Dose


 Route/Sig


 Max Daily Dose Days Date Category


 


 Flomax


  (Tamsulosin Hcl)


 0.4 Mg Cap.er.24h  1 Cap


 PO BID


   4/13/22 Reported


 


 Furosemide 40 Mg


 Tablet  1 Tab


 PO DAILY


  30 2/7/22 Rx


 


 Metoprolol


 Succinate ( Xl )


  (Metoprolol


 Succinate) 25 Mg


 Tab.er.24h  25 Mg


 PO DAILY


  30 2/7/22 Rx


 


 Eliquis


  (Apixaban) 5 Mg


 Tablet  5 Mg


 PO BID


  30 2/7/22 Rx


 


 Refresh


 Lacri-Lube


 Ointment (Mineral


 Oil/Petrolatum,White)


 3.5 Gm Oint...g.  3.5 Gm


 OU DAILY


   2/4/22 Reported


 


 Atorvastatin


 Calcium 20 Mg


 Tablet  2 Tab


 PO HS


   2/4/22 Reported


 


 Omeprazole 20 Mg


 Capsule.dr  1 Cap


 PO DAILY


   2/4/22 Reported


 


 Lubricating Plus


  (Carboxymethylcellulose


 Sodium) 1 Each


 Droperette  1


 OU DAILY


   2/4/22 Reported


 


 Fluticasone


 Propionate Nasal


 Spray


  (Fluticasone


 Propionate) 16 Gm


 Spray.susp  1 Sprays


 NS BID


   2/4/22 Reported


 


 Proair Hfa


 Inhaler


  (Albuterol


 Sulfate) 8.5 Gm


 Hfa.aer.ad  1 Puff


 INH PRN Q6HRS PRN


   7/25/18 Reported


 


 Aspirin 325 Mg


 Tablet  1 Tab


 PO DAILY


   8/3/15 Reported








Comments


Chest x-ray reviewed 4/15/2022.


Diffuse bilateral interstitial infiltrates





Impression


.


1.  Acute hypoxic respiratory failure secondary to acute on chronic systolic 


heart failure with bilateral pleural effusions.  Status post thoracentesis 

4/14/2022


2.  Low-grade fever.  Influenza and Covid negative.  Likely secondary to 

pneumonia.  Currently on antibiotics


3.  Abnormal CT chest with bilateral pleural effusion, which has been persistent


and moderate.  Status post bilateral thoracentesis 4/14/2022


4.  Underlying chronic obstructive pulmonary disease with 30 years of 


tobaccoism.


5.  Hypertension.  Status post 1 L of IV fluid.  With chest x-ray worsening, 

would avoid further IV fluids.


6.  Encephalopathy, likely contributed by hypotension.  CT head negative.  

Patient also received 1 dose of IV morphine which may have contributed as well.


7.  Status post cardiac cath.  Severe three-vessel coronary artery disease and 

severe cardiomyopathy.  Status post intra-aortic balloon pump.


8.  Cardiogenic shock.





Cardiac cath report.





Conclusion


1.  Cardiogenic shock with acute on chronic systolic and diastolic heart failure


2.  Severe three-vessel coronary artery disease


3.  Successful placement of a 7.5 French 40 cc Intermedic balloon pump for 

cardiac shock








Recommendations


1.  Continue medication optimization.  Given the patient's renal failure and 

significant cardiomyopathy with three-vessel coronary artery disease further 

intervention was deferred today in an effort to discuss the risks and benefits 

of high risk PCI with the patient's family and the patient's primary 

cardiologist Dr. Ovalle





Signed by : Avery Lester, 


Electronically Approved : 04/15/2022 17:49:28





Plan


.


Updated 4/16/2022


1.  Pulmonary status stable.  On nasal cannula.  Status post thoracentesis.  

Follow pleural fluid analysis.  Restarted on Eliquis.


2.  Continue present oxygen.  As needed BiPAP.


3.  Ruled out for influenza and COVID.  Pneumonia is likely possibility for the 

source of fever.  On antibiotics.


4.  Status post cardiac cath.  Currently on intra-aortic balloon pump.  Severe 

cardiomyopathy and severe three-vessel coronary artery disease.  Cardiology to 

consider PCI Monday.


5.  Normal pro calcitonin 


6.  Cardiology recommendations.


7.  Discussed with RN 


      Critical care time 35 minutes








RECOMMENDATIONS:


1.  Status post thoracentesis.  Follow pleural fluid analysis.  Restarted on 

Eliquis.


2.  Continue present oxygen.  As needed BiPAP.


3.  Ruled out for influenza and COVID.  Pneumonia is likely possibility for the 

source of fever.  Started on Rocephin.  Will broaden the antibiotic and changed 

to Zosyn.


4.  Avoid any benzos and narcotics.


5.  Hold off diuresis.  Obtain proBNP.  Obtain pro calcitonin as well


6.  Cardiology recommendations.


7.  Discussed with RN 


      Critical care time 30 minutes











GLEN GARCIA MD                 Apr 16, 2022 10:48

## 2022-04-17 VITALS — SYSTOLIC BLOOD PRESSURE: 105 MMHG | DIASTOLIC BLOOD PRESSURE: 52 MMHG

## 2022-04-17 VITALS — DIASTOLIC BLOOD PRESSURE: 58 MMHG | SYSTOLIC BLOOD PRESSURE: 119 MMHG

## 2022-04-17 VITALS — DIASTOLIC BLOOD PRESSURE: 53 MMHG | SYSTOLIC BLOOD PRESSURE: 118 MMHG

## 2022-04-17 VITALS — SYSTOLIC BLOOD PRESSURE: 95 MMHG | DIASTOLIC BLOOD PRESSURE: 49 MMHG

## 2022-04-17 VITALS — DIASTOLIC BLOOD PRESSURE: 59 MMHG | SYSTOLIC BLOOD PRESSURE: 103 MMHG

## 2022-04-17 VITALS — SYSTOLIC BLOOD PRESSURE: 104 MMHG | DIASTOLIC BLOOD PRESSURE: 47 MMHG

## 2022-04-17 VITALS — DIASTOLIC BLOOD PRESSURE: 49 MMHG | SYSTOLIC BLOOD PRESSURE: 105 MMHG

## 2022-04-17 VITALS — SYSTOLIC BLOOD PRESSURE: 121 MMHG | DIASTOLIC BLOOD PRESSURE: 62 MMHG

## 2022-04-17 VITALS — DIASTOLIC BLOOD PRESSURE: 49 MMHG | SYSTOLIC BLOOD PRESSURE: 81 MMHG

## 2022-04-17 VITALS — DIASTOLIC BLOOD PRESSURE: 61 MMHG | SYSTOLIC BLOOD PRESSURE: 122 MMHG

## 2022-04-17 VITALS — DIASTOLIC BLOOD PRESSURE: 62 MMHG | SYSTOLIC BLOOD PRESSURE: 125 MMHG

## 2022-04-17 VITALS — SYSTOLIC BLOOD PRESSURE: 123 MMHG | DIASTOLIC BLOOD PRESSURE: 58 MMHG

## 2022-04-17 VITALS — SYSTOLIC BLOOD PRESSURE: 116 MMHG | DIASTOLIC BLOOD PRESSURE: 63 MMHG

## 2022-04-17 VITALS — SYSTOLIC BLOOD PRESSURE: 111 MMHG | DIASTOLIC BLOOD PRESSURE: 54 MMHG

## 2022-04-17 VITALS — SYSTOLIC BLOOD PRESSURE: 127 MMHG | DIASTOLIC BLOOD PRESSURE: 56 MMHG

## 2022-04-17 VITALS — SYSTOLIC BLOOD PRESSURE: 111 MMHG | DIASTOLIC BLOOD PRESSURE: 52 MMHG

## 2022-04-17 VITALS — SYSTOLIC BLOOD PRESSURE: 103 MMHG | DIASTOLIC BLOOD PRESSURE: 44 MMHG

## 2022-04-17 VITALS — SYSTOLIC BLOOD PRESSURE: 110 MMHG | DIASTOLIC BLOOD PRESSURE: 57 MMHG

## 2022-04-17 VITALS — DIASTOLIC BLOOD PRESSURE: 55 MMHG | SYSTOLIC BLOOD PRESSURE: 102 MMHG

## 2022-04-17 VITALS — SYSTOLIC BLOOD PRESSURE: 113 MMHG | DIASTOLIC BLOOD PRESSURE: 61 MMHG

## 2022-04-17 VITALS — SYSTOLIC BLOOD PRESSURE: 104 MMHG | DIASTOLIC BLOOD PRESSURE: 50 MMHG

## 2022-04-17 VITALS — DIASTOLIC BLOOD PRESSURE: 56 MMHG | SYSTOLIC BLOOD PRESSURE: 119 MMHG

## 2022-04-17 VITALS — DIASTOLIC BLOOD PRESSURE: 56 MMHG | SYSTOLIC BLOOD PRESSURE: 110 MMHG

## 2022-04-17 LAB
ANION GAP SERPL CALC-SCNC: 8 MMOL/L (ref 6–14)
BASOPHILS # BLD AUTO: 0.1 X10^3/UL (ref 0–0.2)
BASOPHILS NFR BLD: 1 % (ref 0–3)
BUN SERPL-MCNC: 31 MG/DL (ref 8–26)
CALCIUM SERPL-MCNC: 8.4 MG/DL (ref 8.5–10.1)
CHLORIDE SERPL-SCNC: 103 MMOL/L (ref 98–107)
CO2 SERPL-SCNC: 28 MMOL/L (ref 21–32)
CREAT SERPL-MCNC: 1.4 MG/DL (ref 0.7–1.3)
EOSINOPHIL NFR BLD: 0.3 X10^3/UL (ref 0–0.7)
EOSINOPHIL NFR BLD: 4 % (ref 0–3)
ERYTHROCYTE [DISTWIDTH] IN BLOOD BY AUTOMATED COUNT: 16.2 % (ref 11.5–14.5)
GFR SERPLBLD BASED ON 1.73 SQ M-ARVRAT: 49.1 ML/MIN
GLUCOSE SERPL-MCNC: 156 MG/DL (ref 70–99)
HCT VFR BLD CALC: 24.9 % (ref 39–53)
HGB BLD-MCNC: 8.1 G/DL (ref 13–17.5)
LYMPHOCYTES # BLD: 0.8 X10^3/UL (ref 1–4.8)
LYMPHOCYTES NFR BLD AUTO: 12 % (ref 24–48)
MCH RBC QN AUTO: 27 PG (ref 25–35)
MCHC RBC AUTO-ENTMCNC: 33 G/DL (ref 31–37)
MCV RBC AUTO: 82 FL (ref 79–100)
MONO #: 0.6 X10^3/UL (ref 0–1.1)
MONOCYTES NFR BLD: 10 % (ref 0–9)
NEUT #: 4.6 X10^3/UL (ref 1.8–7.7)
NEUTROPHILS NFR BLD AUTO: 73 % (ref 31–73)
PLATELET # BLD AUTO: 143 X10^3/UL (ref 140–400)
POTASSIUM SERPL-SCNC: 3.5 MMOL/L (ref 3.5–5.1)
RBC # BLD AUTO: 3.04 X10^6/UL (ref 4.3–5.7)
SODIUM SERPL-SCNC: 139 MMOL/L (ref 136–145)
WBC # BLD AUTO: 6.3 X10^3/UL (ref 4–11)

## 2022-04-17 RX ADMIN — FUROSEMIDE SCH MG: 40 TABLET ORAL at 09:33

## 2022-04-17 RX ADMIN — FLUTICASONE PROPIONATE SCH SPRAY: 50 SPRAY, METERED NASAL at 20:48

## 2022-04-17 RX ADMIN — DICLOFENAC SODIUM SCH APP: 10 GEL TOPICAL at 20:48

## 2022-04-17 RX ADMIN — PIPERACILLIN SODIUM AND TAZOBACTAM SODIUM SCH MLS/HR: 3; .375 INJECTION, POWDER, LYOPHILIZED, FOR SOLUTION INTRAVENOUS at 18:10

## 2022-04-17 RX ADMIN — FLUTICASONE PROPIONATE SCH SPRAY: 50 SPRAY, METERED NASAL at 09:33

## 2022-04-17 RX ADMIN — ATORVASTATIN CALCIUM SCH MG: 20 TABLET, FILM COATED ORAL at 20:48

## 2022-04-17 RX ADMIN — PIPERACILLIN SODIUM AND TAZOBACTAM SODIUM SCH MLS/HR: 3; .375 INJECTION, POWDER, LYOPHILIZED, FOR SOLUTION INTRAVENOUS at 05:57

## 2022-04-17 RX ADMIN — PIPERACILLIN SODIUM AND TAZOBACTAM SODIUM SCH MLS/HR: 3; .375 INJECTION, POWDER, LYOPHILIZED, FOR SOLUTION INTRAVENOUS at 23:30

## 2022-04-17 RX ADMIN — TAMSULOSIN HYDROCHLORIDE SCH MG: 0.4 CAPSULE ORAL at 20:47

## 2022-04-17 RX ADMIN — PIPERACILLIN SODIUM AND TAZOBACTAM SODIUM SCH MLS/HR: 3; .375 INJECTION, POWDER, LYOPHILIZED, FOR SOLUTION INTRAVENOUS at 12:01

## 2022-04-17 RX ADMIN — Medication SCH CAP: at 20:47

## 2022-04-17 RX ADMIN — TAMSULOSIN HYDROCHLORIDE SCH MG: 0.4 CAPSULE ORAL at 09:33

## 2022-04-17 RX ADMIN — PIPERACILLIN SODIUM AND TAZOBACTAM SODIUM SCH MLS/HR: 3; .375 INJECTION, POWDER, LYOPHILIZED, FOR SOLUTION INTRAVENOUS at 00:00

## 2022-04-17 RX ADMIN — CLOPIDOGREL BISULFATE SCH MG: 75 TABLET ORAL at 09:33

## 2022-04-17 RX ADMIN — DICLOFENAC SODIUM SCH APP: 10 GEL TOPICAL at 09:34

## 2022-04-17 RX ADMIN — Medication SCH CAP: at 09:33

## 2022-04-17 RX ADMIN — METOPROLOL SUCCINATE SCH MG: 25 TABLET, EXTENDED RELEASE ORAL at 09:00

## 2022-04-17 RX ADMIN — HEPARIN SODIUM PRN MLS/HR: 10000 INJECTION, SOLUTION INTRAVENOUS at 16:59

## 2022-04-17 NOTE — NUR
Pt stable, no events throughout day. IABP in place-site clean dry and intact. Pt alert and 
oriented x4, bilateral pulses intact. no pain present-patient repositioned. Vital sign 
stable-remain off levophed.

## 2022-04-17 NOTE — PDOC
[facility] PROGRESS NOTE


Date of Service


DOS:


DATE: 4/17/22 


TIME: 11:43





Chief Complaint


Chief Complaint


Assessment/Plan


Acute hypoxic respiratory failure


Bilateral pleural effusions status post thoracentesis 4/14/2022


Acute on chronic CHF exacerbation


Costochondritis


History of CHF with LVEF of 15 to 20% on echo 2/7/2022


History of CAD


History of MIRIAM


History of dyslipidemia


History of hypertension


History of atrial fibrillation on Eliquis








Admit to hospitalist service for further management


Cardiology consult for CHF management


Pulmonology consult for possible thoracentesis


Hold anticoagulation for now patient's chart, labs, images were reviewed and 

discussed with RN


Fluid and sodium restriction


Lovenox for DVT prophylaxis


Protonix GI prophylaxis


ADA diet


CODE STATUS full


Discussed with RN and SW


Disposition inpatient management as above


DPOA: Brisa Deborah





History of Present Illness


History of Present Illness


76-year-old male with multiple comorbidities who comes in with shortness of 

breath and chest pain.  Shortness of breath has been going on for for few 

days.Patient's room air sat while I was in the room was 92% but he is quite 

tachypneic and has to stop frequently during the question-and-answer session to 

catch his breath.  Denies any fevers, abdominal pain, diarrhea, dysuria or 

syncope.





4/14/2022


No acute events overnight.  Patient seen examined bedside.  Saturating 99% on 2 

L nasal cannula.  Patient had thoracentesis done this morning with 800 cc of 

fluid removed.  Will await for fluid analysis.  Patient's chart, labs, images 

were reviewed and discussed with RN





4/15: Afebrile.  S/P bilateral thoracentesis yesterday.  Patient was moved down 

to the ICU after having apneic event.  Code stroke was was called and neurology 

consulted.  No concerns for CVA, per neurology.  Patient will be taken to the 

Cath Lab today to have been intra-aortic balloon pump placed.  Discussed with 

RN.





4/16: Patient seen in ICU.  He has no complaints of chest pain, appears to be in

good spirits.  He is scheduled for high risk PCI on Monday; discussed with son 

at bedside.  He remains on heparin gtt and Levophed.





4/17: Afebrile.  Currently resting calmly in bed without complaints.  He is off 

Levophed.  Plan is for high risk PCI on Monday.  Discussed with RN.





Vitals/I&O


Vitals/I&O:





                                   Vital Signs








  Date Time  Temp Pulse Resp B/P (MAP) Pulse Ox O2 Delivery O2 Flow Rate FiO2


 


4/17/22 11:00  88 16 122/61 98 Room Air  


 


4/17/22 07:00 98.9       





 98.9       


 


4/16/22 17:00       2.0 














                                    I & O   


 


 4/16/22 4/16/22 4/17/22





 15:00 23:00 07:00


 


Intake Total 120 ml 953 ml 551 ml


 


Output Total 1200 ml 1050 ml 385 ml


 


Balance -1080 ml -97 ml 166 ml











Physical Exam


General:  Alert, Oriented X3, Cooperative, No acute distress


Heart:  Other (sinus tachycardia )


Lungs:  Other (Decreased at the bases.)


Abdomen:  Soft, No tenderness


Extremities:  No edema


Skin:  No significant lesion





Labs


Labs:





Laboratory Tests








Test


 4/17/22


04:10 4/17/22


10:00


 


Activated Partial


Thromboplast Time 62 SEC (24-38) 


 55 SEC (24-38) 














Assessment and Plan


Assessmemt and Plan


Problems


Medical Problems:


(1) Bilateral pleural effusion


Status: Acute  





(2) Congestive heart failure (CHF)


Status: Acute  





(3) Shortness of breath


Status: Acute  











Comment


Review of Relevant


I have reviewed the following items ophelia (where applicable) has been applied.


Medications:





Current Medications








 Medications


  (Trade)  Dose


 Ordered  Sig/Joie


 Route


 PRN Reason  Start Time


 Stop Time Status Last Admin


Dose Admin


 


 Clopidogrel


 Bisulfate


  (Plavix)  75 mg  [id number]


 PO


   4/17/22 08:00


    4/17/22 09:33














Justifications for Admission


Other Justification


CHF exacerbation











SANDY COVINGTON MD            Apr 17, 2022 11:44

## 2022-04-17 NOTE — PDOC
CARDIOLOGY PROGRESS NOTE


SUBJECTIVE:


No new events overnight. 


patient in good spirits this morning


Levophed able to be stopped this morning.





OBJECTIVE:


Vital Signs/I&O:





                                   Vital Signs








  Date Time  Temp Pulse Resp B/P (MAP) Pulse Ox O2 Delivery O2 Flow Rate FiO2


 


4/17/22 11:00  88 16 122/61 98 Room Air  


 


4/17/22 07:00 98.9       





 98.9       


 


4/16/22 17:00       2.0 














                                    I & O   


 


 4/16/22 4/16/22 4/17/22





 15:00 23:00 07:00


 


Intake Total 120 ml 953 ml 551 ml


 


Output Total 1200 ml 1050 ml 385 ml


 


Balance -1080 ml -97 ml 166 ml








Objective:


GEN.:    No apparent distress.  Alert and oriented.


HEENT:    Head is normocephalic, atraumatic


NECK:    Supple.  


LUNGS:    Clear to auscultation.


HEART:    RRR, S1, S2 present.  Peripheral pulses intact


ABDOMEN:    Soft, nontender.  Positive bowel sounds.


EXTREMITIES:    Without any cyanosis.    


NEUROLOGIC:     Normal speech, normal tone


PSYCHIATRIC:    Normal affect, normal mood.


SKIN:   No ulcerations





CURRENT MEDICATIONS:


plavix


hep gtt


lasix 40mg daily


atorvastatin 40mg daily


toprol XL 25mg daily





DIAGNOSTIC TESTING:


No new testing. Labs pending


Labs:


                                Laboratory Tests


4/17/22 11:40











Laboratory Tests








Test


 4/17/22


04:10 4/17/22


10:00 4/17/22


11:40


 


Activated Partial


Thromboplast Time 62 SEC (24-38)


H 55 SEC (24-38)


H 





 


White Blood Count


 


 


 6.3 x10^3/uL


(4.0-11.0)


 


Red Blood Count


 


 


 3.04 x10^6/uL


(4.30-5.70)  L


 


Hemoglobin


 


 


 8.1 g/dL


(13.0-17.5)  L


 


Hematocrit


 


 


 24.9 %


(39.0-53.0)  L


 


Mean Corpuscular Volume


 


 


 82 fL ()





 


Mean Corpuscular Hemoglobin   27 pg (25-35)  


 


Mean Corpuscular Hemoglobin


Concent 


 


 33 g/dL


(31-37)


 


Red Cell Distribution Width


 


 


 16.2 %


(11.5-14.5)  H


 


Platelet Count


 


 


 143 x10^3/uL


(140-400)


 


Neutrophils (%) (Auto)   73 % (31-73)  


 


Lymphocytes (%) (Auto)   12 % (24-48)  L


 


Monocytes (%) (Auto)   10 % (0-9)  H


 


Eosinophils (%) (Auto)   4 % (0-3)  H


 


Basophils (%) (Auto)   1 % (0-3)  


 


Neutrophils # (Auto)


 


 


 4.6 x10^3/uL


(1.8-7.7)


 


Lymphocytes # (Auto)


 


 


 0.8 x10^3/uL


(1.0-4.8)  L


 


Monocytes # (Auto)


 


 


 0.6 x10^3/uL


(0.0-1.1)


 


Eosinophils # (Auto)


 


 


 0.3 x10^3/uL


(0.0-0.7)


 


Basophils # (Auto)


 


 


 0.1 x10^3/uL


(0.0-0.2)











ASSESSMENT:


1. Cardiogenic shock and ischemic CMP. Improved. 


2. CAD


3. TERRY





PLAN:


1. Plan for high risk PCI tomorrow for multivessel coronary disease. Again 

discussed with Dr. Ovalle (patient's primary cardiologist) and family and 

patient. They all understand risks/benefits and are willing to proceed. Will 

discuss with Dr. De La Rosa regarding timing and cath lab availability





Justicifation of Admission Dx:


Justifications for Admission:


Justification of Admission Dx:  Yes











NELSON NGUYEN MD       Apr 17, 2022 12:20

## 2022-04-17 NOTE — PDOC
PULMONARY PROGRESS NOTES


DATE: 4/17/22 


TIME: 09:57


Subjective


Patient denies any shortness of breath.  Remains on nasal cannula.


Status post cardiac cath.  Severe three-vessel coronary artery disease and 

severe cardiomyopathy with an EF of 15%.  Status post intra-aortic balloon pump.

 


Status post bilateral thoracentesis at 4/14/2022


Vitals





Vital Signs








  Date Time  Temp Pulse Resp B/P (MAP) Pulse Ox O2 Delivery O2 Flow Rate FiO2


 


4/17/22 08:00      Room Air  


 


4/17/22 06:00  81 12 102/55 97   


 


4/17/22 04:00 98.9       





 98.9       


 


4/16/22 17:00       2.0 








General:  Alert, No acute distress


Lungs:  Other (Decreased at the bases.)


Cardiovascular:  S1, S2


Abdomen:  Soft, Non-tender


Extremities:  No Edema


Skin:  Warm


Labs





Laboratory Tests








Test


 4/15/22


10:59 4/15/22


16:00 4/15/22


22:05 4/16/22


05:25


 


Activated Partial


Thromboplast Time 36 SEC (24-38) 


 37 SEC (24-38) 


 35 SEC (24-38) 


 35 SEC (24-38) 





 


Troponin I High Sensitivity


 21599 ng/L


(4-75) 


 


 





 


White Blood Count


 


 


 


 10.2 x10^3/uL


(4.0-11.0)


 


Red Blood Count


 


 


 


 3.14 x10^6/uL


(4.30-5.70)


 


Hemoglobin


 


 


 


 8.4 g/dL


(13.0-17.5)


 


Hematocrit


 


 


 


 25.9 %


(39.0-53.0)


 


Mean Corpuscular Volume    82 fL () 


 


Mean Corpuscular Hemoglobin    27 pg (25-35) 


 


Mean Corpuscular Hemoglobin


Concent 


 


 


 32 g/dL


(31-37)


 


Red Cell Distribution Width


 


 


 


 16.1 %


(11.5-14.5)


 


Platelet Count


 


 


 


 243 x10^3/uL


(140-400)


 


Neutrophils (%) (Auto)    72 % (31-73) 


 


Lymphocytes (%) (Auto)    13 % (24-48) 


 


Monocytes (%) (Auto)    11 % (0-9) 


 


Eosinophils (%) (Auto)    3 % (0-3) 


 


Basophils (%) (Auto)    1 % (0-3) 


 


Neutrophils # (Auto)


 


 


 


 7.3 x10^3/uL


(1.8-7.7)


 


Lymphocytes # (Auto)


 


 


 


 1.4 x10^3/uL


(1.0-4.8)


 


Monocytes # (Auto)


 


 


 


 1.1 x10^3/uL


(0.0-1.1)


 


Eosinophils # (Auto)


 


 


 


 0.3 x10^3/uL


(0.0-0.7)


 


Basophils # (Auto)


 


 


 


 0.1 x10^3/uL


(0.0-0.2)


 


Sodium Level


 


 


 


 137 mmol/L


(136-145)


 


Potassium Level


 


 


 


 3.4 mmol/L


(3.5-5.1)


 


Chloride Level


 


 


 


 102 mmol/L


()


 


Carbon Dioxide Level


 


 


 


 26 mmol/L


(21-32)


 


Anion Gap    9 (6-14) 


 


Blood Urea Nitrogen


 


 


 


 40 mg/dL


(8-26)


 


Creatinine


 


 


 


 1.6 mg/dL


(0.7-1.3)


 


Estimated GFR


(Cockcroft-Gault) 


 


 


 42.1 





 


Glucose Level


 


 


 


 132 mg/dL


(70-99)


 


Calcium Level


 


 


 


 8.3 mg/dL


(8.5-10.1)


 


Magnesium Level


 


 


 


 2.1 mg/dL


(1.8-2.4)


 


Test


 4/17/22


04:10 


 


 





 


Activated Partial


Thromboplast Time 62 SEC (24-38) 


 


 


 











Laboratory Tests








Test


 4/17/22


04:10


 


Activated Partial


Thromboplast Time 62 SEC (24-38) 











Medications





Active Scripts








 Medications  Dose


 Route/Sig


 Max Daily Dose Days Date Category


 


 Flomax


  (Tamsulosin Hcl)


 0.4 Mg Cap.er.24h  1 Cap


 PO BID


   4/13/22 Reported


 


 Furosemide 40 Mg


 Tablet  1 Tab


 PO DAILY


  30 2/7/22 Rx


 


 Metoprolol


 Succinate ( Xl )


  (Metoprolol


 Succinate) 25 Mg


 Tab.er.24h  25 Mg


 PO DAILY


  30 2/7/22 Rx


 


 Eliquis


  (Apixaban) 5 Mg


 Tablet  5 Mg


 PO BID


  30 2/7/22 Rx


 


 Refresh


 Lacri-Lube


 Ointment (Mineral


 Oil/Petrolatum,White)


 3.5 Gm Oint...g.  3.5 Gm


 OU DAILY


   2/4/22 Reported


 


 Atorvastatin


 Calcium 20 Mg


 Tablet  2 Tab


 PO HS


   2/4/22 Reported


 


 Omeprazole 20 Mg


 Capsule.dr  1 Cap


 PO DAILY


   2/4/22 Reported


 


 Lubricating Plus


  (Carboxymethylcellulose


 Sodium) 1 Each


 Droperette  1


 OU DAILY


   2/4/22 Reported


 


 Fluticasone


 Propionate Nasal


 Spray


  (Fluticasone


 Propionate) 16 Gm


 Spray.susp  1 Sprays


 NS BID


   2/4/22 Reported


 


 Proair Hfa


 Inhaler


  (Albuterol


 Sulfate) 8.5 Gm


 Hfa.aer.ad  1 Puff


 INH PRN Q6HRS PRN


   7/25/18 Reported


 


 Aspirin 325 Mg


 Tablet  1 Tab


 PO DAILY


   8/3/15 Reported








Comments


Chest x-ray reviewed 4/16/2022.


Evidence of interstitial edema.





Chest x-ray reviewed 4/15/2022.


Diffuse bilateral interstitial infiltrates





Impression


.


1.  Acute hypoxic respiratory failure secondary to acute on chronic systolic 


heart failure with bilateral pleural effusions.  Status post thoracentesis 

4/14/2022


2.  Low-grade fever.  Influenza and Covid negative.  Cannot exclude pneumonia.  

Currently on antibiotics


3.  Abnormal CT chest with bilateral pleural effusion, which has been persistent


and moderate.  Status post bilateral thoracentesis 4/14/2022


4.  Underlying chronic obstructive pulmonary disease with 30 years of 


tobaccoism.


5.  Hypotension.  Status post 1 L of IV fluid.  With chest x-ray worsening, 

would avoid further IV fluids.


6.  Encephalopathy, likely contributed by hypotension.  CT head negative.  

Completely resolved.


7.  Status post cardiac cath.  Severe three-vessel coronary artery disease and 

severe cardiomyopathy.  Status post intra-aortic balloon pump.


8.  Cardiogenic shock.





Cardiac cath report.





Conclusion


1.  Cardiogenic shock with acute on chronic systolic and diastolic heart failure


2.  Severe three-vessel coronary artery disease


3.  Successful placement of a 7.5 French 40 cc Intermedic balloon pump for 

cardiac shock








Recommendations


1.  Continue medication optimization.  Given the patient's renal failure and 

significant cardiomyopathy with three-vessel coronary artery disease further 

intervention was deferred today in an effort to discuss the risks and benefits 

of high risk PCI with the patient's family and the patient's primary 

cardiologist Dr. Ovalle





Signed by : Avery Lester, 


Electronically Approved : 04/15/2022 17:49:28





Plan


.


Updated 4/17/2022


1.  Pulmonary status stable.  On nasal cannula.  Status post thoracentesis.  

Follow pleural fluid analysis.  Eliquis on hold.  On heparin per protocol.


2.  Continue present oxygen.  As needed BiPAP.


3.  Ruled out for influenza and COVID.  Pneumonia is likely possibility for the 

source of fever.  On antibiotics.  Clinically improving


4.  Status post cardiac cath.  Currently on intra-aortic balloon pump.  Severe 

cardiomyopathy and severe three-vessel coronary artery disease.  Cardiology to 

consider PCI Monday.


5.  Normal pro calcitonin 


6.  Cardiology recommendations.


7.  Discussed with RN 


      





Updated 4/16/2022


1.  Pulmonary status stable.  On nasal cannula.  Status post thoracentesis.  Fo

llow pleural fluid analysis.  Restarted on Eliquis.


2.  Continue present oxygen.  As needed BiPAP.


3.  Ruled out for influenza and COVID.  Pneumonia is likely possibility for the 

source of fever.  On antibiotics.


4.  Status post cardiac cath.  Currently on intra-aortic balloon pump.  Severe 

cardiomyopathy and severe three-vessel coronary artery disease.  Cardiology to 

consider PCI Monday.


5.  Normal pro calcitonin 


6.  Cardiology recommendations.


7.  Discussed with RN 


      Critical care time 35 minutes








RECOMMENDATIONS:


1.  Status post thoracentesis.  Follow pleural fluid analysis.  Restarted on 

Eliquis.


2.  Continue present oxygen.  As needed BiPAP.


3.  Ruled out for influenza and COVID.  Pneumonia is likely possibility for the 

source of fever.  Started on Rocephin.  Will broaden the antibiotic and changed 

to Zosyn.


4.  Avoid any benzos and narcotics.


5.  Hold off diuresis.  Obtain proBNP.  Obtain pro calcitonin as well


6.  Cardiology recommendations.


7.  Discussed with RN 


      Critical care time 30 minutes











GLEN GARCIA MD                 Apr 17, 2022 10:00 no

## 2022-04-18 VITALS — SYSTOLIC BLOOD PRESSURE: 108 MMHG | DIASTOLIC BLOOD PRESSURE: 49 MMHG

## 2022-04-18 VITALS — DIASTOLIC BLOOD PRESSURE: 62 MMHG | SYSTOLIC BLOOD PRESSURE: 119 MMHG

## 2022-04-18 VITALS — DIASTOLIC BLOOD PRESSURE: 77 MMHG | SYSTOLIC BLOOD PRESSURE: 94 MMHG

## 2022-04-18 VITALS — SYSTOLIC BLOOD PRESSURE: 115 MMHG | DIASTOLIC BLOOD PRESSURE: 52 MMHG

## 2022-04-18 VITALS — SYSTOLIC BLOOD PRESSURE: 103 MMHG | DIASTOLIC BLOOD PRESSURE: 67 MMHG

## 2022-04-18 VITALS — DIASTOLIC BLOOD PRESSURE: 53 MMHG | SYSTOLIC BLOOD PRESSURE: 117 MMHG

## 2022-04-18 VITALS — DIASTOLIC BLOOD PRESSURE: 52 MMHG | SYSTOLIC BLOOD PRESSURE: 92 MMHG

## 2022-04-18 VITALS — DIASTOLIC BLOOD PRESSURE: 70 MMHG | SYSTOLIC BLOOD PRESSURE: 128 MMHG

## 2022-04-18 VITALS — DIASTOLIC BLOOD PRESSURE: 54 MMHG | SYSTOLIC BLOOD PRESSURE: 105 MMHG

## 2022-04-18 VITALS — DIASTOLIC BLOOD PRESSURE: 59 MMHG | SYSTOLIC BLOOD PRESSURE: 102 MMHG

## 2022-04-18 VITALS — SYSTOLIC BLOOD PRESSURE: 128 MMHG | DIASTOLIC BLOOD PRESSURE: 60 MMHG

## 2022-04-18 VITALS — DIASTOLIC BLOOD PRESSURE: 63 MMHG | SYSTOLIC BLOOD PRESSURE: 124 MMHG

## 2022-04-18 VITALS — DIASTOLIC BLOOD PRESSURE: 67 MMHG | SYSTOLIC BLOOD PRESSURE: 120 MMHG

## 2022-04-18 VITALS — DIASTOLIC BLOOD PRESSURE: 64 MMHG | SYSTOLIC BLOOD PRESSURE: 112 MMHG

## 2022-04-18 VITALS — SYSTOLIC BLOOD PRESSURE: 120 MMHG | DIASTOLIC BLOOD PRESSURE: 58 MMHG

## 2022-04-18 VITALS — DIASTOLIC BLOOD PRESSURE: 53 MMHG | SYSTOLIC BLOOD PRESSURE: 114 MMHG

## 2022-04-18 VITALS — SYSTOLIC BLOOD PRESSURE: 139 MMHG | DIASTOLIC BLOOD PRESSURE: 66 MMHG

## 2022-04-18 VITALS — SYSTOLIC BLOOD PRESSURE: 102 MMHG | DIASTOLIC BLOOD PRESSURE: 56 MMHG

## 2022-04-18 VITALS — DIASTOLIC BLOOD PRESSURE: 56 MMHG | SYSTOLIC BLOOD PRESSURE: 108 MMHG

## 2022-04-18 VITALS — SYSTOLIC BLOOD PRESSURE: 100 MMHG | DIASTOLIC BLOOD PRESSURE: 54 MMHG

## 2022-04-18 VITALS — DIASTOLIC BLOOD PRESSURE: 56 MMHG | SYSTOLIC BLOOD PRESSURE: 115 MMHG

## 2022-04-18 VITALS — SYSTOLIC BLOOD PRESSURE: 112 MMHG | DIASTOLIC BLOOD PRESSURE: 56 MMHG

## 2022-04-18 VITALS — DIASTOLIC BLOOD PRESSURE: 58 MMHG | SYSTOLIC BLOOD PRESSURE: 111 MMHG

## 2022-04-18 VITALS — DIASTOLIC BLOOD PRESSURE: 61 MMHG | SYSTOLIC BLOOD PRESSURE: 109 MMHG

## 2022-04-18 LAB
ERYTHROCYTE [DISTWIDTH] IN BLOOD BY AUTOMATED COUNT: 16 % (ref 11.5–14.5)
HCT VFR BLD CALC: 24.2 % (ref 39–53)
HGB BLD-MCNC: 7.8 G/DL (ref 13–17.5)
MCH RBC QN AUTO: 27 PG (ref 25–35)
MCHC RBC AUTO-ENTMCNC: 32 G/DL (ref 31–37)
MCV RBC AUTO: 82 FL (ref 79–100)
PLATELET # BLD AUTO: 135 X10^3/UL (ref 140–400)
RBC # BLD AUTO: 2.97 X10^6/UL (ref 4.3–5.7)
WBC # BLD AUTO: 7.4 X10^3/UL (ref 4–11)

## 2022-04-18 PROCEDURE — B2111ZZ FLUOROSCOPY OF MULTIPLE CORONARY ARTERIES USING LOW OSMOLAR CONTRAST: ICD-10-PCS | Performed by: RADIOLOGY

## 2022-04-18 PROCEDURE — 02H633Z INSERTION OF INFUSION DEVICE INTO RIGHT ATRIUM, PERCUTANEOUS APPROACH: ICD-10-PCS | Performed by: INTERNAL MEDICINE

## 2022-04-18 PROCEDURE — 02C13Z7 EXTIRPATION OF MATTER FROM CORONARY ARTERY, TWO ARTERIES, ORBITAL ATHERECTOMY TECHNIQUE, PERCUTANEOUS APPROACH: ICD-10-PCS | Performed by: RADIOLOGY

## 2022-04-18 PROCEDURE — 027236Z DILATION OF CORONARY ARTERY, THREE ARTERIES WITH THREE DRUG-ELUTING INTRALUMINAL DEVICES, PERCUTANEOUS APPROACH: ICD-10-PCS | Performed by: RADIOLOGY

## 2022-04-18 PROCEDURE — 4A023N7 MEASUREMENT OF CARDIAC SAMPLING AND PRESSURE, LEFT HEART, PERCUTANEOUS APPROACH: ICD-10-PCS | Performed by: INTERNAL MEDICINE

## 2022-04-18 PROCEDURE — 5A02210 ASSISTANCE WITH CARDIAC OUTPUT USING BALLOON PUMP, CONTINUOUS: ICD-10-PCS | Performed by: RADIOLOGY

## 2022-04-18 RX ADMIN — FUROSEMIDE SCH MG: 40 TABLET ORAL at 08:34

## 2022-04-18 RX ADMIN — METOPROLOL SUCCINATE SCH MG: 25 TABLET, EXTENDED RELEASE ORAL at 08:34

## 2022-04-18 RX ADMIN — Medication SCH CAP: at 21:32

## 2022-04-18 RX ADMIN — FLUTICASONE PROPIONATE SCH SPRAY: 50 SPRAY, METERED NASAL at 21:38

## 2022-04-18 RX ADMIN — PIPERACILLIN SODIUM AND TAZOBACTAM SODIUM SCH MLS/HR: 3; .375 INJECTION, POWDER, LYOPHILIZED, FOR SOLUTION INTRAVENOUS at 11:28

## 2022-04-18 RX ADMIN — Medication SCH CAP: at 18:12

## 2022-04-18 RX ADMIN — PIPERACILLIN SODIUM AND TAZOBACTAM SODIUM SCH MLS/HR: 3; .375 INJECTION, POWDER, LYOPHILIZED, FOR SOLUTION INTRAVENOUS at 05:56

## 2022-04-18 RX ADMIN — PIPERACILLIN SODIUM AND TAZOBACTAM SODIUM SCH MLS/HR: 3; .375 INJECTION, POWDER, LYOPHILIZED, FOR SOLUTION INTRAVENOUS at 23:52

## 2022-04-18 RX ADMIN — ATORVASTATIN CALCIUM SCH MG: 20 TABLET, FILM COATED ORAL at 21:32

## 2022-04-18 RX ADMIN — TAMSULOSIN HYDROCHLORIDE SCH MG: 0.4 CAPSULE ORAL at 18:12

## 2022-04-18 RX ADMIN — HEPARIN SODIUM PRN MLS/HR: 10000 INJECTION, SOLUTION INTRAVENOUS at 13:42

## 2022-04-18 RX ADMIN — DICLOFENAC SODIUM SCH APP: 10 GEL TOPICAL at 21:32

## 2022-04-18 RX ADMIN — TAMSULOSIN HYDROCHLORIDE SCH MG: 0.4 CAPSULE ORAL at 21:32

## 2022-04-18 RX ADMIN — CLOPIDOGREL BISULFATE SCH MG: 75 TABLET ORAL at 08:48

## 2022-04-18 RX ADMIN — FLUTICASONE PROPIONATE SCH SPRAY: 50 SPRAY, METERED NASAL at 08:49

## 2022-04-18 RX ADMIN — DICLOFENAC SODIUM SCH APP: 10 GEL TOPICAL at 08:49

## 2022-04-18 RX ADMIN — PIPERACILLIN SODIUM AND TAZOBACTAM SODIUM SCH MLS/HR: 3; .375 INJECTION, POWDER, LYOPHILIZED, FOR SOLUTION INTRAVENOUS at 18:10

## 2022-04-18 NOTE — NUR
Nursing Note:

Left venous and Arterial sheaths removed after 20-25 minutes of manual pressure. Pt 
tolerated well.

## 2022-04-18 NOTE — PDOC
[facility] PROGRESS NOTE


Date of Service


DOS:


DATE: 4/18/22 


TIME: 14:51





Chief Complaint


Chief Complaint


Assessment/Plan


Acute hypoxic respiratory failure


Bilateral pleural effusions status post thoracentesis 4/14/2022


Acute on chronic CHF exacerbation


Costochondritis


History of CHF with LVEF of 15 to 20% on echo 2/7/2022


History of CAD


History of MIRIAM


History of dyslipidemia


History of hypertension


History of atrial fibrillation on Eliquis








Admit to hospitalist service for further management


Cardiology consult for CHF management


Pulmonology consult for possible thoracentesis


Hold anticoagulation for now patient's chart, labs, images were reviewed and 

discussed with RN


Fluid and sodium restriction


Lovenox for DVT prophylaxis


Protonix GI prophylaxis


ADA diet


CODE STATUS full


Discussed with RN and SW


Disposition inpatient management as above


DPOA: Brisa Cabrera





History of Present Illness


History of Present Illness


76-year-old male with multiple comorbidities who comes in with shortness of 

breath and chest pain.  Shortness of breath has been going on for for few 

days.Patient's room air sat while I was in the room was 92% but he is quite 

tachypneic and has to stop frequently during the question-and-answer session to 

catch his breath.  Denies any fevers, abdominal pain, diarrhea, dysuria or 

syncope.





4/14/2022


No acute events overnight.  Patient seen examined bedside.  Saturating 99% on 2 

L nasal cannula.  Patient had thoracentesis done this morning with 800 cc of 

fluid removed.  Will await for fluid analysis.  Patient's chart, labs, images 

were reviewed and discussed with RN





4/15: Afebrile.  S/P bilateral thoracentesis yesterday.  Patient was moved down 

to the ICU after having apneic event.  Code stroke was was called and neurology 

consulted.  No concerns for CVA, per neurology.  Patient will be taken to the 

Cath Lab today to have been intra-aortic balloon pump placed.  Discussed with 

RN.





4/16: Patient seen in ICU.  He has no complaints of chest pain, appears to be in

good spirits.  He is scheduled for high risk PCI on Monday; discussed with son 

at bedside.  He remains on heparin gtt and Levophed.





4/17: Afebrile.  Currently resting calmly in bed without complaints.  He is off 

Levophed.  Plan is for high risk PCI on Monday.  Discussed with RN.





4/18: Patient with no complaints today, denies chest pain.  Remains in good 

spirits.  Cardiology to perform high risk PCI today.  Discussed with RN.





Vitals/I&O


Vitals/I&O:





                                   Vital Signs








  Date Time  Temp Pulse Resp B/P (MAP) Pulse Ox O2 Delivery O2 Flow Rate FiO2


 


4/18/22 14:00  77 18 111/58 98 Room Air  


 


4/18/22 12:00 97.9       





 97.9       














                                    I & O   


 


 4/17/22 4/17/22 4/18/22





 15:00 23:00 07:00


 


Intake Total 415 ml 290 ml 215 ml


 


Output Total 135 ml 650 ml 300 ml


 


Balance 280 ml -360 ml -85 ml











Physical Exam


General:  Alert, Oriented X3, Cooperative, No acute distress


Heart:  Other (sinus tachycardia )


Lungs:  Other (Decreased at the bases.)


Abdomen:  Soft, No tenderness


Extremities:  No edema


Skin:  No significant lesion





Labs


Labs:





Laboratory Tests








Test


 4/17/22


17:08 4/17/22


23:05 4/18/22


05:15


 


Activated Partial


Thromboplast Time 69 SEC (24-38) 


 89 SEC (24-38) 


 92 SEC (24-38) 





 


White Blood Count


 


 


 7.4 x10^3/uL


(4.0-11.0)


 


Red Blood Count


 


 


 2.97 x10^6/uL


(4.30-5.70)


 


Hemoglobin


 


 


 7.8 g/dL


(13.0-17.5)


 


Hematocrit


 


 


 24.2 %


(39.0-53.0)


 


Mean Corpuscular Volume   82 fL () 


 


Mean Corpuscular Hemoglobin   27 pg (25-35) 


 


Mean Corpuscular Hemoglobin


Concent 


 


 32 g/dL


(31-37)


 


Red Cell Distribution Width


 


 


 16.0 %


(11.5-14.5)


 


Platelet Count


 


 


 135 x10^3/uL


(140-400)











Assessment and Plan


Assessmemt and Plan


Problems


Medical Problems:


(1) Bilateral pleural effusion


Status: Acute  





(2) Congestive heart failure (CHF)


Status: Acute  





(3) Shortness of breath


Status: Acute  











Comment


Review of Relevant


I have reviewed the following items ophelia (where applicable) has been applied.





Justifications for Admission


Other Justification


CHF exacerbation











SANDY COVINGTON MD            Apr 18, 2022 14:52

## 2022-04-18 NOTE — PDOC
PULMONARY PROGRESS NOTES


DATE: 4/18/22 


TIME: 10:15


Subjective


Patient denies any shortness of breath.  Remains on nasal cannula.


Status post cardiac cath.  Severe three-vessel coronary artery disease and 

severe cardiomyopathy with an EF of 15%.  Status post intra-aortic balloon pump.

 


Status post bilateral thoracentesis at 4/14/2022


Vitals





Vital Signs








  Date Time  Temp Pulse Resp B/P (MAP) Pulse Ox O2 Delivery O2 Flow Rate FiO2


 


4/18/22 10:00  79 16 114/53 97 Room Air  


 


4/18/22 08:00 98.3       





 98.3       








General:  Alert, No acute distress


Lungs:  Other (Decreased at the bases.)


Cardiovascular:  S1, S2


Abdomen:  Soft, Non-tender


Extremities:  No Edema


Skin:  Warm


Labs





Laboratory Tests








Test


 4/17/22


04:10 4/17/22


10:00 4/17/22


11:40 4/17/22


17:08


 


Activated Partial


Thromboplast Time 62 SEC (24-38) 


 55 SEC (24-38) 


 


 69 SEC (24-38) 





 


White Blood Count


 


 


 6.3 x10^3/uL


(4.0-11.0) 





 


Red Blood Count


 


 


 3.04 x10^6/uL


(4.30-5.70) 





 


Hemoglobin


 


 


 8.1 g/dL


(13.0-17.5) 





 


Hematocrit


 


 


 24.9 %


(39.0-53.0) 





 


Mean Corpuscular Volume   82 fL ()  


 


Mean Corpuscular Hemoglobin   27 pg (25-35)  


 


Mean Corpuscular Hemoglobin


Concent 


 


 33 g/dL


(31-37) 





 


Red Cell Distribution Width


 


 


 16.2 %


(11.5-14.5) 





 


Platelet Count


 


 


 143 x10^3/uL


(140-400) 





 


Neutrophils (%) (Auto)   73 % (31-73)  


 


Lymphocytes (%) (Auto)   12 % (24-48)  


 


Monocytes (%) (Auto)   10 % (0-9)  


 


Eosinophils (%) (Auto)   4 % (0-3)  


 


Basophils (%) (Auto)   1 % (0-3)  


 


Neutrophils # (Auto)


 


 


 4.6 x10^3/uL


(1.8-7.7) 





 


Lymphocytes # (Auto)


 


 


 0.8 x10^3/uL


(1.0-4.8) 





 


Monocytes # (Auto)


 


 


 0.6 x10^3/uL


(0.0-1.1) 





 


Eosinophils # (Auto)


 


 


 0.3 x10^3/uL


(0.0-0.7) 





 


Basophils # (Auto)


 


 


 0.1 x10^3/uL


(0.0-0.2) 





 


Sodium Level


 


 


 139 mmol/L


(136-145) 





 


Potassium Level


 


 


 3.5 mmol/L


(3.5-5.1) 





 


Chloride Level


 


 


 103 mmol/L


() 





 


Carbon Dioxide Level


 


 


 28 mmol/L


(21-32) 





 


Anion Gap   8 (6-14)  


 


Blood Urea Nitrogen


 


 


 31 mg/dL


(8-26) 





 


Creatinine


 


 


 1.4 mg/dL


(0.7-1.3) 





 


Estimated GFR


(Cockcroft-Gault) 


 


 49.1 


 





 


Glucose Level


 


 


 156 mg/dL


(70-99) 





 


Calcium Level


 


 


 8.4 mg/dL


(8.5-10.1) 





 


Test


 4/17/22


23:05 4/18/22


05:15 


 





 


Activated Partial


Thromboplast Time 89 SEC (24-38) 


 92 SEC (24-38) 


 


 





 


White Blood Count


 


 7.4 x10^3/uL


(4.0-11.0) 


 





 


Red Blood Count


 


 2.97 x10^6/uL


(4.30-5.70) 


 





 


Hemoglobin


 


 7.8 g/dL


(13.0-17.5) 


 





 


Hematocrit


 


 24.2 %


(39.0-53.0) 


 





 


Mean Corpuscular Volume  82 fL ()   


 


Mean Corpuscular Hemoglobin  27 pg (25-35)   


 


Mean Corpuscular Hemoglobin


Concent 


 32 g/dL


(31-37) 


 





 


Red Cell Distribution Width


 


 16.0 %


(11.5-14.5) 


 





 


Platelet Count


 


 135 x10^3/uL


(140-400) 


 











Laboratory Tests








Test


 4/17/22


11:40 4/17/22


17:08 4/17/22


23:05 4/18/22


05:15


 


White Blood Count


 6.3 x10^3/uL


(4.0-11.0) 


 


 7.4 x10^3/uL


(4.0-11.0)


 


Red Blood Count


 3.04 x10^6/uL


(4.30-5.70) 


 


 2.97 x10^6/uL


(4.30-5.70)


 


Hemoglobin


 8.1 g/dL


(13.0-17.5) 


 


 7.8 g/dL


(13.0-17.5)


 


Hematocrit


 24.9 %


(39.0-53.0) 


 


 24.2 %


(39.0-53.0)


 


Mean Corpuscular Volume 82 fL ()    82 fL () 


 


Mean Corpuscular Hemoglobin 27 pg (25-35)    27 pg (25-35) 


 


Mean Corpuscular Hemoglobin


Concent 33 g/dL


(31-37) 


 


 32 g/dL


(31-37)


 


Red Cell Distribution Width


 16.2 %


(11.5-14.5) 


 


 16.0 %


(11.5-14.5)


 


Platelet Count


 143 x10^3/uL


(140-400) 


 


 135 x10^3/uL


(140-400)


 


Neutrophils (%) (Auto) 73 % (31-73)    


 


Lymphocytes (%) (Auto) 12 % (24-48)    


 


Monocytes (%) (Auto) 10 % (0-9)    


 


Eosinophils (%) (Auto) 4 % (0-3)    


 


Basophils (%) (Auto) 1 % (0-3)    


 


Neutrophils # (Auto)


 4.6 x10^3/uL


(1.8-7.7) 


 


 





 


Lymphocytes # (Auto)


 0.8 x10^3/uL


(1.0-4.8) 


 


 





 


Monocytes # (Auto)


 0.6 x10^3/uL


(0.0-1.1) 


 


 





 


Eosinophils # (Auto)


 0.3 x10^3/uL


(0.0-0.7) 


 


 





 


Basophils # (Auto)


 0.1 x10^3/uL


(0.0-0.2) 


 


 





 


Sodium Level


 139 mmol/L


(136-145) 


 


 





 


Potassium Level


 3.5 mmol/L


(3.5-5.1) 


 


 





 


Chloride Level


 103 mmol/L


() 


 


 





 


Carbon Dioxide Level


 28 mmol/L


(21-32) 


 


 





 


Anion Gap 8 (6-14)    


 


Blood Urea Nitrogen


 31 mg/dL


(8-26) 


 


 





 


Creatinine


 1.4 mg/dL


(0.7-1.3) 


 


 





 


Estimated GFR


(Cockcroft-Gault) 49.1 


 


 


 





 


Glucose Level


 156 mg/dL


(70-99) 


 


 





 


Calcium Level


 8.4 mg/dL


(8.5-10.1) 


 


 





 


Activated Partial


Thromboplast Time 


 69 SEC (24-38) 


 89 SEC (24-38) 


 92 SEC (24-38) 











Medications





Active Scripts








 Medications  Dose


 Route/Sig


 Max Daily Dose Days Date Category


 


 Flomax


  (Tamsulosin Hcl)


 0.4 Mg Cap.er.24h  1 Cap


 PO BID


   4/13/22 Reported


 


 Furosemide 40 Mg


 Tablet  1 Tab


 PO DAILY


  30 2/7/22 Rx


 


 Metoprolol


 Succinate ( Xl )


  (Metoprolol


 Succinate) 25 Mg


 Tab.er.24h  25 Mg


 PO DAILY


  30 2/7/22 Rx


 


 Eliquis


  (Apixaban) 5 Mg


 Tablet  5 Mg


 PO BID


  30 2/7/22 Rx


 


 Refresh


 Lacri-Lube


 Ointment (Mineral


 Oil/Petrolatum,White)


 3.5 Gm Oint...g.  3.5 Gm


 OU DAILY


   2/4/22 Reported


 


 Atorvastatin


 Calcium 20 Mg


 Tablet  2 Tab


 PO HS


   2/4/22 Reported


 


 Omeprazole 20 Mg


 Capsule.dr  1 Cap


 PO DAILY


   2/4/22 Reported


 


 Lubricating Plus


  (Carboxymethylcellulose


 Sodium) 1 Each


 Droperette  1


 OU DAILY


   2/4/22 Reported


 


 Fluticasone


 Propionate Nasal


 Spray


  (Fluticasone


 Propionate) 16 Gm


 Spray.susp  1 Sprays


 NS BID


   2/4/22 Reported


 


 Proair Hfa


 Inhaler


  (Albuterol


 Sulfate) 8.5 Gm


 Hfa.aer.ad  1 Puff


 INH PRN Q6HRS PRN


   7/25/18 Reported


 


 Aspirin 325 Mg


 Tablet  1 Tab


 PO DAILY


   8/3/15 Reported








Comments


Chest x-ray reviewed 4/16/2022.


Evidence of interstitial edema.





Chest x-ray reviewed 4/15/2022.


Diffuse bilateral interstitial infiltrates





Impression


.


1.  Acute hypoxic respiratory failure secondary to acute on chronic systolic 


heart failure with bilateral pleural effusions.  Status post thoracentesis 

4/14/2022


2.  Low-grade fever.  Influenza and Covid negative.  Cannot exclude pneumonia.  

Currently on antibiotics


3.  Abnormal CT chest with bilateral pleural effusion, which has been persistent


and moderate.  Status post bilateral thoracentesis 4/14/2022


4.  Underlying chronic obstructive pulmonary disease with 30 years of 


tobaccoism.


5.  Hypotension.  Status post 1 L of IV fluid.  With chest x-ray worsening, w

ould avoid further IV fluids.


6.  Encephalopathy, likely contributed by hypotension.  CT head negative.  

Completely resolved.


7.  Status post cardiac cath.  Severe three-vessel coronary artery disease and 

severe cardiomyopathy.  Status post intra-aortic balloon pump.


8.  Cardiogenic shock.





Cardiac cath report.





Conclusion


1.  Cardiogenic shock with acute on chronic systolic and diastolic heart failure


2.  Severe three-vessel coronary artery disease


3.  Successful placement of a 7.5 French 40 cc Intermedic balloon pump for 

cardiac shock








Recommendations


1.  Continue medication optimization.  Given the patient's renal failure and si

gnificant cardiomyopathy with three-vessel coronary artery disease further 

intervention was deferred today in an effort to discuss the risks and benefits 

of high risk PCI with the patient's family and the patient's primary 

cardiologist Dr. Ovalle





Signed by : Avery Lester, 


Electronically Approved : 04/15/2022 17:49:28





Plan


.


Updated 4/18/2022


1.  Pulmonary status stable.  On room air.  Status post thoracentesis.  Follow 

pleural fluid analysis.  Eliquis on hold.  On heparin per protocol.


2.  Continue present oxygen. 


3.  Ruled out for influenza and COVID.  Pneumonia is likely possibility for the 

source of fever.  On antibiotics.  Clinically improving


4.  Status post cardiac cath.  Currently on intra-aortic balloon pump.  Severe 

cardiomyopathy and severe three-vessel coronary artery disease.  Cardiology to 

consider PCI later today.


5.  Normal pro calcitonin 


6.  Cardiology recommendations.


7.  Discussed with RN 





Updated 4/17/2022


1.  Pulmonary status stable.  On nasal cannula.  Status post thoracentesis.  

Follow pleural fluid analysis.  Eliquis on hold.  On heparin per protocol.


2.  Continue present oxygen.  As needed BiPAP.


3.  Ruled out for influenza and COVID.  Pneumonia is likely possibility for the 

source of fever.  On antibiotics.  Clinically improving


4.  Status post cardiac cath.  Currently on intra-aortic balloon pump.  Severe 

cardiomyopathy and severe three-vessel coronary artery disease.  Cardiology to 

consider PCI Monday.


5.  Normal pro calcitonin 


6.  Cardiology recommendations.


7.  Discussed with GLEN ARVIZU MD                 Apr 18, 2022 10:16

## 2022-04-18 NOTE — PDOC
PROGRESS NOTES


Date of Service


DATE: 4/18/22 


TIME: 08:32


Assessment


Problems


Medical Problems:


(1) Bilateral pleural effusion


Status: Acute  





(2) Congestive heart failure (CHF)


Status: Acute  





(3) Shortness of breath


Status: Acute  





Event on 4/14 was Cheyne-Schroeder respiration from his heart failure and 

hypotension.  Add to this his history of sleep apnea.  Also interesting is his 

history of nightmares and sleep issues, he says that he has had trouble like 

this before when he is falling asleep.  I do not see any evidence that he has 

had a stroke, transient ischemic attack, or seizure.  He has been fully worked 

up for this in the past.  No additional spells








Plan


No additional neurological studies needed


Treatment of medical issues.


Subjective


No complaints


Objective





Vital Signs








  Date Time  Temp Pulse Resp B/P (MAP) Pulse Ox O2 Delivery O2 Flow Rate FiO2


 


4/18/22 06:00  79 14 139/66 96 Room Air  


 


4/18/22 04:00 98.4       





 98.4       














Intake and Output 


 


 4/18/22





 07:00


 


Intake Total 920 ml


 


Output Total 1085 ml


 


Balance -165 ml


 


 


 


Intake Oral 605 ml


 


IV Total 315 ml


 


Output Urine Total 1085 ml








PHYSICAL EXAM


Alert. Oriented to time, place and person.


PERRL.


EOMI.


CN: no focal findings.


Muscle tone: normal.


Muscle strength: 5/5


DTR: 2+


Plantar reflex: Flexor


Gait: not examined in bed.


Sensory exam: no abnormal findings.


No cerebellar signs elicited.


Review of Relevant


I have reviewed the following items ophelia (where applicable) has been applied.


Labs





Laboratory Tests








Test


 4/17/22


04:10 4/17/22


10:00 4/17/22


11:40 4/17/22


17:08


 


Activated Partial


Thromboplast Time 62 SEC (24-38) 


 55 SEC (24-38) 


 


 69 SEC (24-38) 





 


White Blood Count


 


 


 6.3 x10^3/uL


(4.0-11.0) 





 


Red Blood Count


 


 


 3.04 x10^6/uL


(4.30-5.70) 





 


Hemoglobin


 


 


 8.1 g/dL


(13.0-17.5) 





 


Hematocrit


 


 


 24.9 %


(39.0-53.0) 





 


Mean Corpuscular Volume   82 fL ()  


 


Mean Corpuscular Hemoglobin   27 pg (25-35)  


 


Mean Corpuscular Hemoglobin


Concent 


 


 33 g/dL


(31-37) 





 


Red Cell Distribution Width


 


 


 16.2 %


(11.5-14.5) 





 


Platelet Count


 


 


 143 x10^3/uL


(140-400) 





 


Neutrophils (%) (Auto)   73 % (31-73)  


 


Lymphocytes (%) (Auto)   12 % (24-48)  


 


Monocytes (%) (Auto)   10 % (0-9)  


 


Eosinophils (%) (Auto)   4 % (0-3)  


 


Basophils (%) (Auto)   1 % (0-3)  


 


Neutrophils # (Auto)


 


 


 4.6 x10^3/uL


(1.8-7.7) 





 


Lymphocytes # (Auto)


 


 


 0.8 x10^3/uL


(1.0-4.8) 





 


Monocytes # (Auto)


 


 


 0.6 x10^3/uL


(0.0-1.1) 





 


Eosinophils # (Auto)


 


 


 0.3 x10^3/uL


(0.0-0.7) 





 


Basophils # (Auto)


 


 


 0.1 x10^3/uL


(0.0-0.2) 





 


Sodium Level


 


 


 139 mmol/L


(136-145) 





 


Potassium Level


 


 


 3.5 mmol/L


(3.5-5.1) 





 


Chloride Level


 


 


 103 mmol/L


() 





 


Carbon Dioxide Level


 


 


 28 mmol/L


(21-32) 





 


Anion Gap   8 (6-14)  


 


Blood Urea Nitrogen


 


 


 31 mg/dL


(8-26) 





 


Creatinine


 


 


 1.4 mg/dL


(0.7-1.3) 





 


Estimated GFR


(Cockcroft-Gault) 


 


 49.1 


 





 


Glucose Level


 


 


 156 mg/dL


(70-99) 





 


Calcium Level


 


 


 8.4 mg/dL


(8.5-10.1) 





 


Test


 4/17/22


23:05 4/18/22


05:15 


 





 


Activated Partial


Thromboplast Time 89 SEC (24-38) 


 92 SEC (24-38) 


 


 





 


White Blood Count


 


 7.4 x10^3/uL


(4.0-11.0) 


 





 


Red Blood Count


 


 2.97 x10^6/uL


(4.30-5.70) 


 





 


Hemoglobin


 


 7.8 g/dL


(13.0-17.5) 


 





 


Hematocrit


 


 24.2 %


(39.0-53.0) 


 





 


Mean Corpuscular Volume  82 fL ()   


 


Mean Corpuscular Hemoglobin  27 pg (25-35)   


 


Mean Corpuscular Hemoglobin


Concent 


 32 g/dL


(31-37) 


 





 


Red Cell Distribution Width


 


 16.0 %


(11.5-14.5) 


 





 


Platelet Count


 


 135 x10^3/uL


(140-400) 


 











Laboratory Tests








Test


 4/17/22


10:00 4/17/22


11:40 4/17/22


17:08 4/17/22


23:05


 


Activated Partial


Thromboplast Time 55 SEC (24-38) 


 


 69 SEC (24-38) 


 89 SEC (24-38) 





 


White Blood Count


 


 6.3 x10^3/uL


(4.0-11.0) 


 





 


Red Blood Count


 


 3.04 x10^6/uL


(4.30-5.70) 


 





 


Hemoglobin


 


 8.1 g/dL


(13.0-17.5) 


 





 


Hematocrit


 


 24.9 %


(39.0-53.0) 


 





 


Mean Corpuscular Volume  82 fL ()   


 


Mean Corpuscular Hemoglobin  27 pg (25-35)   


 


Mean Corpuscular Hemoglobin


Concent 


 33 g/dL


(31-37) 


 





 


Red Cell Distribution Width


 


 16.2 %


(11.5-14.5) 


 





 


Platelet Count


 


 143 x10^3/uL


(140-400) 


 





 


Neutrophils (%) (Auto)  73 % (31-73)   


 


Lymphocytes (%) (Auto)  12 % (24-48)   


 


Monocytes (%) (Auto)  10 % (0-9)   


 


Eosinophils (%) (Auto)  4 % (0-3)   


 


Basophils (%) (Auto)  1 % (0-3)   


 


Neutrophils # (Auto)


 


 4.6 x10^3/uL


(1.8-7.7) 


 





 


Lymphocytes # (Auto)


 


 0.8 x10^3/uL


(1.0-4.8) 


 





 


Monocytes # (Auto)


 


 0.6 x10^3/uL


(0.0-1.1) 


 





 


Eosinophils # (Auto)


 


 0.3 x10^3/uL


(0.0-0.7) 


 





 


Basophils # (Auto)


 


 0.1 x10^3/uL


(0.0-0.2) 


 





 


Sodium Level


 


 139 mmol/L


(136-145) 


 





 


Potassium Level


 


 3.5 mmol/L


(3.5-5.1) 


 





 


Chloride Level


 


 103 mmol/L


() 


 





 


Carbon Dioxide Level


 


 28 mmol/L


(21-32) 


 





 


Anion Gap  8 (6-14)   


 


Blood Urea Nitrogen


 


 31 mg/dL


(8-26) 


 





 


Creatinine


 


 1.4 mg/dL


(0.7-1.3) 


 





 


Estimated GFR


(Cockcroft-Gault) 


 49.1 


 


 





 


Glucose Level


 


 156 mg/dL


(70-99) 


 





 


Calcium Level


 


 8.4 mg/dL


(8.5-10.1) 


 





 


Test


 4/18/22


05:15 


 


 





 


White Blood Count


 7.4 x10^3/uL


(4.0-11.0) 


 


 





 


Red Blood Count


 2.97 x10^6/uL


(4.30-5.70) 


 


 





 


Hemoglobin


 7.8 g/dL


(13.0-17.5) 


 


 





 


Hematocrit


 24.2 %


(39.0-53.0) 


 


 





 


Mean Corpuscular Volume 82 fL ()    


 


Mean Corpuscular Hemoglobin 27 pg (25-35)    


 


Mean Corpuscular Hemoglobin


Concent 32 g/dL


(31-37) 


 


 





 


Red Cell Distribution Width


 16.0 %


(11.5-14.5) 


 


 





 


Platelet Count


 135 x10^3/uL


(140-400) 


 


 





 


Activated Partial


Thromboplast Time 92 SEC (24-38) 


 


 


 











Microbiology


4/14/22 Gram Stain - Final, Resulted


          


4/14/22 Aerobic and Anaerobic Culture - Preliminary, Resulted


Medications





Current Medications


Aspirin (Aspirin Chewable) 324 mg 1X  ONCE PO  Last administered on 4/12/22at 

18:30;  Start 4/12/22 at 18:15;  Stop 4/12/22 at 18:16;  Status DC


Albuterol/ Ipratropium (Duoneb) 3 ml 1X  ONCE NEB  Last administered on 

4/12/22at 20:08;  Start 4/12/22 at 19:45;  Stop 4/12/22 at 19:46;  Status DC


Iohexol (Omnipaque 350 Mg/ml) 75 ml 1X  ONCE IV  Last administered on 4/12/22at 

19:45;  Start 4/12/22 at 19:45;  Stop 4/12/22 at 19:46;  Status DC


Acetaminophen (Tylenol) 650 mg PRN Q6HRS  PRN PO MILD PAIN / TEMP > 100.3'F;  St

art 4/12/22 at 21:15;  Stop 4/15/22 at 10:53;  Status DC


Ondansetron HCl (Zofran) 4 mg PRN Q4HRS  PRN IVP NAUSEA/VOMITING 1st choice;  

Start 4/12/22 at 21:15;  Stop 4/16/22 at 16:07;  Status DC


Guaifenesin (Robitussin Dm) 10 ml PRN Q6HRS  PRN PO COUGH;  Start 4/12/22 at 

21:15


Furosemide (Lasix) 40 mg 1X  ONCE IVP  Last administered on 4/12/22at 21:30;  

Start 4/12/22 at 21:30;  Stop 4/12/22 at 21:31;  Status DC


Morphine Sulfate (Morphine Sulfate) 2 mg PRN Q6HRS  PRN IVP SEVERE PAIN 7-10 

Last administered on 4/16/22at 05:30;  Start 4/13/22 at 00:00


Sennosides (Senna) 17.2 mg PRN BID  PRN PO CONSTIPATION;  Start 4/13/22 at 09:00


Docusate Sodium (Colace) 100 mg PRN DAILY  PRN PO HARD STOOLS;  Start 4/13/22 at

09:00


Ondansetron HCl (Zofran) 4 mg PRN Q6HRS  PRN IVP NAUSEA/VOMITING, 1st CHOICE;  

Start 4/13/22 at 09:00


Dextrose (Dextrose 50%-Water Syringe) 12.5 gm PRN Q15MIN  PRN IV SEE COMMENTS;  

Start 4/13/22 at 09:00


Acetaminophen (Tylenol) 650 mg PRN Q4HRS  PRN PO TEMP OVER 100.4F OR MILD PAIN; 

Start 4/13/22 at 09:00


Lorazepam (Ativan) 0.5 mg PRN Q6HRS  PRN PO ANXIETY / AGITATION;  Start 4/13/22 

at 09:00


Lorazepam (Ativan Inj) 0.25 mg PRN Q4HRS  PRN IV ANXIETY / AGITATION;  Start 

4/13/22 at 09:00


Enoxaparin Sodium (Lovenox 40mg Syringe) 40 mg Q24H SQ ;  Start 4/13/22 at 

09:00;  Stop 4/13/22 at 11:01;  Status DC


Prochlorperazine Edisylate (Compazine) 10 mg PRN Q6HRS  PRN IV NAUSEA/VOMITING, 

2nd CHOICE;  Start 4/13/22 at 09:00


Diphenhydramine HCl (Benadryl) 25 mg PRN Q6HRS  PRN IVP ITCHING;  Start 4/13/22 

at 09:00


Diphenhydramine HCl (Benadryl) 25 mg PRN Q6HRS  PRN PO ITCHING;  Start 4/13/22 

at 09:00


Diphenhydramine HCl (Benadryl) 25 mg PRN QHS  PRN PO INSOMNIA, 1st CHOICE Last 

administered on 4/17/22at 20:47;  Start 4/13/22 at 09:00


Zolpidem Tartrate (Ambien) 2.5 mg PRN QHS  PRN PO INSOMNIA, 2nd CHOICE;  Start 

4/13/22 at 09:00


Atorvastatin Calcium (Lipitor) 40 mg HS PO  Last administered on 4/17/22at 

20:48;  Start 4/13/22 at 21:00


Metoprolol Succinate (Toprol Xl) 25 mg DAILY PO  Last administered on 4/14/22at 

08:23;  Start 4/13/22 at 10:30


Diclofenac Sodium (Voltaren) 1 jatin BID TP  Last administered on 4/17/22at 20:48;

 Start 4/13/22 at 11:00


Furosemide (Lasix) 40 mg 1X  ONCE IVP  Last administered on 4/13/22at 11:33;  

Start 4/13/22 at 11:30;  Stop 4/13/22 at 11:31;  Status DC


Potassium Chloride (Klor-Con) 20 meq 1X  ONCE PO  Last administered on 4/13/22at

11:33;  Start 4/13/22 at 11:30;  Stop 4/13/22 at 11:31;  Status DC


Fluticasone Propionate (Flonase) 1 spray BID NS  Last administered on 4/17/22at 

20:48;  Start 4/13/22 at 21:00


Tamsulosin HCl (Flomax) 0.4 mg BID PO  Last administered on 4/17/22at 20:47;  

Start 4/13/22 at 21:00


Ceftriaxone Sodium (Rocephin) 1 gm Q24H IVP  Last administered on 4/14/22at 

12:44;  Start 4/14/22 at 13:00;  Stop 4/15/22 at 07:17;  Status DC


Apixaban (Eliquis) 5 mg BID PO ;  Start 4/14/22 at 21:00;  Stop 4/15/22 at 

10:52;  Status DC


Furosemide (Lasix) 40 mg DAILY PO  Last administered on 4/17/22at 09:33;  Start 

4/14/22 at 14:00


Info (Anti-Coagulation Monitoring By Pharmacy) 1 each PRN DAILY  PRN MC PER 

PROTOCOL;  Start 4/14/22 at 14:15


Lactobacillus Rhamnosus (Culturelle) 1 cap BID PO  Last administered on 

4/17/22at 20:47;  Start 4/14/22 at 21:00


Norepinephrine Bitartrate 8 mg/ Dextrose 258 ml @  11.262 mls/ hr CONT  PRN IV 

PER PROTOCOL Last administered on 4/16/22at 10:31;  Start 4/15/22 at 00:00


Heparin Sodium/ Dextrose 250 ml @  6.984 mls/ hr CONT  PRN IV PER PROTOCOL;  

Start 4/15/22 at 04:15;  Stop 4/15/22 at 04:28;  Status DC


Heparin Sodium (Porcine) (Heparin Sodium) 1,450 unit PRN Q6HRS  PRN IV FOR UFH 

LEVEL LESS THAN 0.2;  Start 4/15/22 at 04:15;  Stop 4/15/22 at 04:28;  Status DC


Heparin Sodium/ Dextrose 250 ml @  6.984 mls/ hr CONT  PRN IV PER PROTOCOL Last 

administered on 4/17/22at 16:59;  Start 4/15/22 at 04:30


Heparin Sodium (Porcine) (Heparin Sodium) 1,450 unit PRN Q6HRS  PRN IV FOR PTT <

40 Last administered on 4/15/22at 23:24;  Start 4/15/22 at 04:30


Piperacillin Sod/ Tazobactam Sod (Zosyn Per Pharmacy) 1 each PRN DAILY  PRN MC 

SEE COMMENTS;  Start 4/15/22 at 07:30


Piperacillin Sod/ Tazobactam Sod 3.375 gm/Sodium Chloride 50 ml @  100 mls/hr 

Q6HRS IV  Last administered on 4/18/22at 05:56;  Start 4/15/22 at 07:30


Dobutamine HCl/ Dextrose 250 ml @  4.073 mls/ hr CONT  PRN IV SEE I/O RECORD;  

Start 4/15/22 at 10:30


Lidocaine HCl (Xylocaine-Mpf 1% 2ml Vial) 2 ml STK-MED ONCE .ROUTE ;  Start 

4/15/22 at 12:13;  Stop 4/15/22 at 12:13;  Status DC


Iodixanol (Visipaque 320) 100 ml STK-MED ONCE .ROUTE ;  Start 4/15/22 at 12:13; 

Stop 4/15/22 at 12:13;  Status DC


Heparin Sodium/ Sodium Chloride 1,500 ml @  As Directed STK-MED ONCE .ROUTE ;  

Start 4/15/22 at 12:13;  Stop 4/15/22 at 12:14;  Status DC


Fentanyl Citrate (Fentanyl 2ml Vial) 100 mcg STK-MED ONCE .ROUTE ;  Start 

4/15/22 at 12:40;  Stop 4/15/22 at 12:40;  Status DC


Midazolam HCl (Versed) 2 mg STK-MED ONCE .ROUTE ;  Start 4/15/22 at 12:40;  Stop

4/15/22 at 12:40;  Status DC


Heparin Sodium (Porcine) (Heparin Sodium) 10,000 unit STK-MED ONCE .ROUTE ;  

Start 4/15/22 at 12:40;  Stop 4/15/22 at 12:40;  Status DC


Verapamil HCl (Verapamil) 5 mg STK-MED ONCE .ROUTE ;  Start 4/15/22 at 12:40;  

Stop 4/15/22 at 12:40;  Status DC


Nitroglycerin (Nitroglycerin) 200 mcg STK-MED ONCE .ROUTE ;  Start 4/15/22 at 

12:40;  Stop 4/15/22 at 12:41;  Status DC


Nitroglycerin (Nitroglycerin) 200 mcg 1X  ONCE IART  Last administered on 

4/15/22at 13:39;  Start 4/15/22 at 13:00;  Stop 4/15/22 at 13:01;  Status DC


Verapamil HCl (Verapamil) 2.5 mg 1X  ONCE IART  Last administered on 4/15/22at 

13:40;  Start 4/15/22 at 13:00;  Stop 4/15/22 at 13:01;  Status DC


Heparin Sodium (Porcine) (Heparin Sodium) 2,500 unit 1X  ONCE IART  Last 

administered on 4/15/22at 13:38;  Start 4/15/22 at 13:00;  Stop 4/15/22 at 

13:01;  Status DC


Heparin Sodium/ Sodium Chloride (HEPARIN for ARTERIAL LINE FLUSH) 1,000 unit 1X 

ONCE IART  Last administered on 4/15/22at 13:33;  Start 4/15/22 at 13:00;  Stop 

4/15/22 at 13:01;  Status DC


Heparin Sodium/ Sodium Chloride (HEPARIN for ARTERIAL LINE FLUSH) 1,000 unit 1X 

ONCE IART  Last administered on 4/15/22at 13:33;  Start 4/15/22 at 13:00;  Stop 

4/15/22 at 13:01;  Status DC


Iodixanol (Visipaque 320) 100 ml 1X  ONCE IART  Last administered on 4/15/22at 

13:33;  Start 4/15/22 at 13:00;  Stop 4/15/22 at 13:01;  Status DC


Lidocaine HCl (Xylocaine-Mpf 1% 2ml Vial) 2 ml 1X  ONCE INJ  Last administered 

on 4/15/22at 13:34;  Start 4/15/22 at 13:00;  Stop 4/15/22 at 13:01;  Status DC


Info (CONTRAST GIVEN -- Rx MONITORING) 1 each PRN DAILY  PRN MC SEE COMMENTS;  

Start 4/15/22 at 13:00;  Stop 4/17/22 at 12:59;  Status DC


Lidocaine HCl (Lidocaine 1% 20ml Vial) 20 ml STK-MED ONCE .ROUTE ;  Start 

4/15/22 at 13:06;  Stop 4/15/22 at 13:06;  Status DC


Heparin Sodium (Porcine) (Heparin 5,000 Units/1,000ml NS) 5,000 unit 1X  ONCE IV

 Last administered on 4/15/22at 13:15;  Start 4/15/22 at 13:15;  Stop 4/15/22 at

13:16;  Status DC


Lidocaine HCl (Lidocaine 1% 20ml Vial) 20 ml 1X  ONCE INJ  Last administered on 

4/15/22at 13:45;  Start 4/15/22 at 13:45;  Stop 4/15/22 at 13:46;  Status DC


Potassium Chloride (Klor-Con) 40 meq 1X  ONCE PO  Last administered on 4/16/22at

08:00;  Start 4/16/22 at 07:30;  Stop 4/16/22 at 07:32;  Status DC


Clopidogrel Bisulfate (Plavix) 75 mg DAILYWBKFT PO  Last administered on 4/17/ 22at 09:33;  Start 4/17/22 at 08:00


Clopidogrel Bisulfate (Plavix) 300 mg 1X  ONCE PO  Last administered on 

4/16/22at 10:49;  Start 4/16/22 at 10:45;  Stop 4/16/22 at 10:46;  Status DC





Active Scripts


Active


Furosemide 40 Mg Tablet 1 Tab PO DAILY 30 Days


Metoprolol Succinate ( Xl ) (Metoprolol Succinate) 25 Mg Tab.er.24h 25 Mg PO 

DAILY 30 Days


Eliquis (Apixaban) 5 Mg Tablet 5 Mg PO BID 30 Days


Reported


Stiolto Respimat Inhal Spray (Tiotropium Br/Olodaterol HCl) 4 Gm Mist.inhal 4 Gm

IH HS


Flomax (Tamsulosin Hcl) 0.4 Mg Cap.er.24h 1 Cap PO BID


Refresh Lacri-Lube Ointment (Mineral Oil/Petrolatum,White) 3.5 Gm Oint...g. 3.5 

Gm OU DAILY


Atorvastatin Calcium 20 Mg Tablet 2 Tab PO HS


Omeprazole 20 Mg Capsule.dr 1 Cap PO DAILY


Lubricating Plus (Carboxymethylcellulose Sodium) 1 Each Droperette 1 OU DAILY


Proair Hfa Inhaler (Albuterol Sulfate) 8.5 Gm Hfa.aer.ad 1 Puff INH PRN Q6HRS 

PRN


Aspirin 325 Mg Tablet 1 Tab PO DAILY


Vitals/I & O





Vital Sign - Last 24 Hours








 4/17/22 4/17/22 4/17/22 4/17/22





 09:00 10:00 11:00 12:00


 


Temp    98.6





    98.6


 


Pulse 86 88 88 88


 


Resp 14 16 16 16


 


B/P (MAP) 103/59 110/56 122/61 81/49


 


Pulse Ox 98 99 98 98


 


O2 Delivery Room Air Room Air Room Air Room Air


 


    





    





 4/17/22 4/17/22 4/17/22 4/17/22





 12:00 13:00 14:00 15:00


 


Pulse  96 92 89


 


Resp  16 16 16


 


B/P (MAP)  127/56 110/57 103/44


 


Pulse Ox  99 99 99


 


O2 Delivery Room Air Room Air Room Air Room Air





 4/17/22 4/17/22 4/17/22 4/17/22





 16:00 16:00 17:00 18:00


 


Temp 98.6   





 98.6   


 


Pulse 88  86 83


 


Resp 16 16 16


 


B/P (MAP) 81/49  95/49 104/50


 


Pulse Ox 98  99 99


 


O2 Delivery Room Air Room Air Room Air Room Air


 


    





    





 4/17/22 4/17/22 4/17/22 4/17/22





 19:00 20:00 20:00 21:00


 


Temp   98.4 





   98.4 


 


Pulse 83  92 96


 


Resp 21 21 24


 


B/P (MAP) 116/63  118/53 119/56


 


Pulse Ox 96  99 96


 


O2 Delivery Room Air Room Air Room Air Room Air


 


    





    





 4/17/22 4/17/22 4/18/22 4/18/22





 22:00 23:00 00:00 00:00


 


Temp   98.1 





   98.1 


 


Pulse 95 81 80 


 


Resp 23 24 18 


 


B/P (MAP) 105/52 105/49 105/54 


 


Pulse Ox 92 93 96 


 


O2 Delivery Room Air Room Air Room Air Room Air


 


    





    





 4/18/22 4/18/22 4/18/22 4/18/22





 01:00 02:00 03:00 04:00


 


Temp    98.4





    98.4


 


Pulse 84 81 81 73


 


Resp 12 21 16 10


 


B/P (MAP) 102/56 102/59 112/56 108/56


 


Pulse Ox 100 94 97 98


 


O2 Delivery Room Air Room Air Room Air Room Air


 


    





    





 4/18/22 4/18/22 4/18/22 





 04:00 05:00 06:00 


 


Pulse  82 79 


 


Resp  18 14 


 


B/P (MAP)  115/52 139/66 


 


Pulse Ox  98 96 


 


O2 Delivery Room Air Room Air Room Air 














Intake and Output   


 


 4/17/22 4/17/22 4/18/22





 15:00 23:00 07:00


 


Intake Total 415 ml 290 ml 215 ml


 


Output Total 135 ml 650 ml 300 ml


 


Balance 280 ml -360 ml -85 ml











Justicifation of Admission Dx:


Justifications for Admission:


Justification of Admission Dx:  Yes











BELLA ALVAREZ MD           Apr 18, 2022 08:33

## 2022-04-19 VITALS — DIASTOLIC BLOOD PRESSURE: 54 MMHG | SYSTOLIC BLOOD PRESSURE: 96 MMHG

## 2022-04-19 VITALS — DIASTOLIC BLOOD PRESSURE: 71 MMHG | SYSTOLIC BLOOD PRESSURE: 125 MMHG

## 2022-04-19 VITALS — DIASTOLIC BLOOD PRESSURE: 56 MMHG | SYSTOLIC BLOOD PRESSURE: 113 MMHG

## 2022-04-19 VITALS — SYSTOLIC BLOOD PRESSURE: 102 MMHG | DIASTOLIC BLOOD PRESSURE: 62 MMHG

## 2022-04-19 VITALS — DIASTOLIC BLOOD PRESSURE: 51 MMHG | SYSTOLIC BLOOD PRESSURE: 100 MMHG

## 2022-04-19 VITALS — SYSTOLIC BLOOD PRESSURE: 99 MMHG | DIASTOLIC BLOOD PRESSURE: 52 MMHG

## 2022-04-19 VITALS — DIASTOLIC BLOOD PRESSURE: 67 MMHG | SYSTOLIC BLOOD PRESSURE: 135 MMHG

## 2022-04-19 VITALS — DIASTOLIC BLOOD PRESSURE: 64 MMHG | SYSTOLIC BLOOD PRESSURE: 121 MMHG

## 2022-04-19 VITALS — DIASTOLIC BLOOD PRESSURE: 55 MMHG | SYSTOLIC BLOOD PRESSURE: 100 MMHG

## 2022-04-19 VITALS — SYSTOLIC BLOOD PRESSURE: 131 MMHG | DIASTOLIC BLOOD PRESSURE: 62 MMHG

## 2022-04-19 VITALS — DIASTOLIC BLOOD PRESSURE: 57 MMHG | SYSTOLIC BLOOD PRESSURE: 94 MMHG

## 2022-04-19 VITALS — SYSTOLIC BLOOD PRESSURE: 112 MMHG | DIASTOLIC BLOOD PRESSURE: 66 MMHG

## 2022-04-19 VITALS — SYSTOLIC BLOOD PRESSURE: 111 MMHG | DIASTOLIC BLOOD PRESSURE: 66 MMHG

## 2022-04-19 VITALS — SYSTOLIC BLOOD PRESSURE: 92 MMHG | DIASTOLIC BLOOD PRESSURE: 56 MMHG

## 2022-04-19 VITALS — SYSTOLIC BLOOD PRESSURE: 96 MMHG | DIASTOLIC BLOOD PRESSURE: 66 MMHG

## 2022-04-19 VITALS — SYSTOLIC BLOOD PRESSURE: 95 MMHG | DIASTOLIC BLOOD PRESSURE: 56 MMHG

## 2022-04-19 VITALS — DIASTOLIC BLOOD PRESSURE: 75 MMHG | SYSTOLIC BLOOD PRESSURE: 127 MMHG

## 2022-04-19 VITALS — SYSTOLIC BLOOD PRESSURE: 98 MMHG | DIASTOLIC BLOOD PRESSURE: 58 MMHG

## 2022-04-19 VITALS — SYSTOLIC BLOOD PRESSURE: 107 MMHG | DIASTOLIC BLOOD PRESSURE: 62 MMHG

## 2022-04-19 VITALS — SYSTOLIC BLOOD PRESSURE: 112 MMHG | DIASTOLIC BLOOD PRESSURE: 60 MMHG

## 2022-04-19 VITALS — SYSTOLIC BLOOD PRESSURE: 122 MMHG | DIASTOLIC BLOOD PRESSURE: 63 MMHG

## 2022-04-19 VITALS — DIASTOLIC BLOOD PRESSURE: 55 MMHG | SYSTOLIC BLOOD PRESSURE: 102 MMHG

## 2022-04-19 VITALS — SYSTOLIC BLOOD PRESSURE: 103 MMHG | DIASTOLIC BLOOD PRESSURE: 58 MMHG

## 2022-04-19 LAB
ANION GAP SERPL CALC-SCNC: 10 MMOL/L (ref 6–14)
APTT BLD: 35 SEC (ref 24–38)
BASOPHILS # BLD AUTO: 0.1 X10^3/UL (ref 0–0.2)
BASOPHILS NFR BLD: 1 % (ref 0–3)
BUN SERPL-MCNC: 22 MG/DL (ref 8–26)
CALCIUM SERPL-MCNC: 8.4 MG/DL (ref 8.5–10.1)
CHLORIDE SERPL-SCNC: 103 MMOL/L (ref 98–107)
CO2 SERPL-SCNC: 26 MMOL/L (ref 21–32)
CREAT SERPL-MCNC: 1.2 MG/DL (ref 0.7–1.3)
EOSINOPHIL NFR BLD: 0.3 X10^3/UL (ref 0–0.7)
EOSINOPHIL NFR BLD: 3 % (ref 0–3)
ERYTHROCYTE [DISTWIDTH] IN BLOOD BY AUTOMATED COUNT: 15.9 % (ref 11.5–14.5)
GFR SERPLBLD BASED ON 1.73 SQ M-ARVRAT: 58.7 ML/MIN
GLUCOSE SERPL-MCNC: 94 MG/DL (ref 70–99)
HCT VFR BLD CALC: 25.7 % (ref 39–53)
HGB BLD-MCNC: 8.4 G/DL (ref 13–17.5)
LYMPHOCYTES # BLD: 0.9 X10^3/UL (ref 1–4.8)
LYMPHOCYTES NFR BLD AUTO: 9 % (ref 24–48)
MCH RBC QN AUTO: 27 PG (ref 25–35)
MCHC RBC AUTO-ENTMCNC: 33 G/DL (ref 31–37)
MCV RBC AUTO: 81 FL (ref 79–100)
MONO #: 0.9 X10^3/UL (ref 0–1.1)
MONOCYTES NFR BLD: 9 % (ref 0–9)
NEUT #: 7.7 X10^3/UL (ref 1.8–7.7)
NEUTROPHILS NFR BLD AUTO: 79 % (ref 31–73)
PLATELET # BLD AUTO: 139 X10^3/UL (ref 140–400)
POTASSIUM SERPL-SCNC: 3.6 MMOL/L (ref 3.5–5.1)
PROTHROMBIN TIME: 15.8 SEC (ref 11.7–14)
RBC # BLD AUTO: 3.15 X10^6/UL (ref 4.3–5.7)
SODIUM SERPL-SCNC: 139 MMOL/L (ref 136–145)
WBC # BLD AUTO: 9.7 X10^3/UL (ref 4–11)

## 2022-04-19 RX ADMIN — PIPERACILLIN SODIUM AND TAZOBACTAM SODIUM SCH MLS/HR: 3; .375 INJECTION, POWDER, LYOPHILIZED, FOR SOLUTION INTRAVENOUS at 17:17

## 2022-04-19 RX ADMIN — ATORVASTATIN CALCIUM SCH MG: 20 TABLET, FILM COATED ORAL at 21:14

## 2022-04-19 RX ADMIN — Medication SCH CAP: at 21:13

## 2022-04-19 RX ADMIN — FLUTICASONE PROPIONATE SCH SPRAY: 50 SPRAY, METERED NASAL at 09:20

## 2022-04-19 RX ADMIN — DICLOFENAC SODIUM SCH APP: 10 GEL TOPICAL at 21:14

## 2022-04-19 RX ADMIN — Medication SCH CAP: at 09:22

## 2022-04-19 RX ADMIN — FUROSEMIDE SCH MG: 40 TABLET ORAL at 09:22

## 2022-04-19 RX ADMIN — DICLOFENAC SODIUM SCH APP: 10 GEL TOPICAL at 09:20

## 2022-04-19 RX ADMIN — CLOPIDOGREL BISULFATE SCH MG: 75 TABLET ORAL at 09:22

## 2022-04-19 RX ADMIN — PIPERACILLIN SODIUM AND TAZOBACTAM SODIUM SCH MLS/HR: 3; .375 INJECTION, POWDER, LYOPHILIZED, FOR SOLUTION INTRAVENOUS at 12:22

## 2022-04-19 RX ADMIN — FLUTICASONE PROPIONATE SCH SPRAY: 50 SPRAY, METERED NASAL at 21:14

## 2022-04-19 RX ADMIN — PIPERACILLIN SODIUM AND TAZOBACTAM SODIUM SCH MLS/HR: 3; .375 INJECTION, POWDER, LYOPHILIZED, FOR SOLUTION INTRAVENOUS at 06:14

## 2022-04-19 RX ADMIN — TAMSULOSIN HYDROCHLORIDE SCH MG: 0.4 CAPSULE ORAL at 09:20

## 2022-04-19 RX ADMIN — DICLOFENAC SODIUM SCH APP: 10 GEL TOPICAL at 13:20

## 2022-04-19 RX ADMIN — TAMSULOSIN HYDROCHLORIDE SCH MG: 0.4 CAPSULE ORAL at 21:13

## 2022-04-19 RX ADMIN — METOPROLOL SUCCINATE SCH MG: 25 TABLET, EXTENDED RELEASE ORAL at 09:21

## 2022-04-19 NOTE — CARD
MR#: [id number]

Account#: [id number]

Accession#: [id number]

Date of Study: 04/18/2022

Ordering Physician: JOHANNA DE LA ROSA, 

Referring Physician: JOHANNA DE LA ROSA, 

Tech: RT Moe(R)





--------------- APPROVED REPORT --------------





Technologist: RT Moe(R)

Nurse: Jes Gerber RN



Procedure(s) performed: 1.  Left heart catheterization and selective coronary angiography

2.  Successful high risk orbital atherectomy/PTCA/drug-eluting stents placement to the left anterior 
descending and right coronary arteries

3.  Successful PTCA/drug-eluting stent placement to the second obtuse marginal branch of left circumf
urban artery

4.  Insertion of temporary transvenous pacemaker with removal at the end of procedure



FLUORO TIME: 34.4 MIN

DOSE: 147 Gycm2

Contrast: 197ml

Mod Sed: 125 Min



INDICATION

The indication(s) include : 76-year-old male with non-STEMI and cardiogenic shock was recently found 
to have severe three-vessel coronary artery disease on cardiac catheterization 4/15/2022 and underwen
t aortic balloon pump for hemodynamic support. He was deemed a poor candidate for coronary artery byp
ass surgery and hence presented today for high risk multivessel atherectomy/PCI/stents placement..



CASE TECHNIQUE

IV conscious sedation was used throughout procedure with appropriate monitoring and was performed in 
the presence of a registered nurse who was an independent trained observer other than the physician p
erforming the procedure. During this case, Fluoroscopy and low osmolar contrast were used for imaging
. Specimen(s) Removed: No Estimated Blood loss: 20 cc's.



PROCEDURE NARRATIVE

After explaining the risk, benefits and alternative options, informed consent was obtained.  Patient 
was brought to the cardiac Cath Lab and his left groin was prepped and draped in the usual fashion.  
20 cc of 2% lidocaine was infiltrated to the skin and subcutaneous tissues for local anesthesia.  Art
erial and venous accesses were obtained in the left common femoral artery and vein respectively and 6
 and 5 Indonesian sheaths inserted.  A 5 Indonesian temporary transvenous pacemaker wire was then advanced un
jose alejandro fluoroscopic guidance, the tip was positioned in the right ventricular apex and was turned on for
 backup support during atherectomy. 



The left main coronary artery was engaged with a 6 Indonesian XB 3.5 guide catheter and selective angiogr
aphy was performed that confirmed the previously described 90% stenosis in the proximal to mid segmen
t of left anterior descending artery and 70 to 80% stenosis involving the second obtuse marginal bran
ch of left circumflex artery.  The stenosis in the left anterior descending artery was crossed with a
 0.014 inch Asahi Prowater guidewire which was then exchanged over a quick cross microcatheter to the
 Viper wire.  Multiple orbital atherectomy passes were then performed within the lesion using CSI aristides
mondback 1.25 orbital atherectomy catheter.  This was then predilated with a 2.5 x 15 mm Sapphire II 
Pro balloon.  Following this, this was successfully treated with a 2.5 x 22 mm resolute Purdum drug-elu
ting stent.  Follow-up angiography showed resolution of the stenosis to 0% with MARBIN-3 distal flow. S
ubsequently, the stenosis in the second obtuse marginal branch of left circumflex artery was crossed 
with the Asahi Prowater guidewire.  This was predilated with a 2.0 x 12 mm sapphire pro balloon.  The
 lesion was then treated successfully with a 2.25 x 15 mm resolute Purdum drug-eluting stent.  Follow-u
p angiography showed resolution of the stenosis to 0% with MARBIN-3 distal flow.



The right coronary artery was then engaged with a 6 [place] JR4 guide catheter.  Selective angiography
 confirmed the previously described tandem 90% stenosis involving the mid segment.  These lesions wer
e crossed with the Asahi Prowater guidewire which was then exchanged over the quick cross microcathet
er to the Viper wire.  Multiple orbital atherectomy passes were then performed using the CSI diamondb
ack 1.25 orbital atherectomy catheter.  The lesions were then dilated with a 2.5 x 15 mm sapphire pro
 balloon.  Following this, they were successfully treated with a 2.75 x 30 mm resolute Purdum drug-elut
ing stent.  Follow-up angiography showed resolution of the stenosis to 0% with MARBIN-3 distal flow.  T
he temporary transvenous pacemaker was then successfully removed.  The balloon pump was left in place
 since blood pressure was borderline with plans for removal in 24 hours.  We could not use closure de
vice for achieving hemostasis due to high bifurcation.  Since we used Angiomax for the intervention, 
we will remove the arterial and venous lines in 2 to 3 hours and achieve hemostasis with manual compr
ession.  Patient tolerated the procedure well.  There were no immediate complications.



Conclusion

1.  Severe three-vessel coronary artery disease and cardiogenic shock needing intra-aortic balloon pu
mp for hemodynamic support

2.  Successful high risk orbital atherectomy/PCI/drug-eluting stents placement to left anterior desce
nding and right coronary arteries

3.  Successful PCI/drug-eluting stent placement to second obtuse marginal branch of left circumflex a
rtery

4.  Insertion of temporary transvenous pacemaker with removal at the end of procedure



Recommendations

1.  Aspirin 325 mg daily for 1 month followed by 81 mg daily

2.  Plavix 75 mg daily

3.  Plan for removal of intra-aortic balloon pump if hemodynamically stable in 24 hours

4.  Cardiovascular risk factor modification and cardiac rehabilitation referral at discharge



Signed by : Johanna De La Rosa, 

Electronically Approved : 04/19/2022 08:46:51

## 2022-04-19 NOTE — PDOC
[employer] PROGRESS NOTE


Date of Service


DOS:


DATE: 4/19/22 


TIME: 14:39





Chief Complaint


Chief Complaint


Assessment/Plan


Severe three-vessel CAD found on [facility] 4/15/2022 and underwent IABP support.  

Status post high risk PCI on 4/18/2022 with atherectomy and KRYSTYNA stent placement 

to LAD and RCA, KRYSTYNA to OM2 of LCA


Temporary transvenous pacer


Acute hypoxic respiratory failure


Bilateral pleural effusions status post bilateral thoracentesis 4/14/2022,


Acute on chronic CHF exacerbation


Costochondritis


History of CHF with LVEF of 15 to 20% on echo 2/7/2022


History of CAD


History of MIRIAM


History of dyslipidemia


History of hypertension


History of atrial fibrillation on Eliquis








Admit to hospitalist service for further management


Cardiology consult for CHF management


Pulmonology consult for possible thoracentesis


Hold anticoagulation for now patient's chart, labs, images were reviewed and 

discussed with RN


Fluid and sodium restriction


Lovenox for DVT prophylaxis


Protonix GI prophylaxis


ADA diet


CODE STATUS full


Discussed with RN and SW


Disposition inpatient management as above


DPOA: Brisa Cabrera





History of Present Illness


History of Present Illness


76-year-old male with multiple comorbidities who comes in with shortness of 

breath and chest pain.  Shortness of breath has been going on for for few 

days.Patient's room air sat while I was in the room was 92% but he is quite 

tachypneic and has to stop frequently during the question-and-answer session to 

catch his breath.  Denies any fevers, abdominal pain, diarrhea, dysuria or 

syncope.





4/14/2022


No acute events overnight.  Patient seen examined bedside.  Saturating 99% on 2 

L nasal cannula.  Patient had thoracentesis done this morning with 800 cc of 

fluid removed.  Will await for fluid analysis.  Patient's chart, labs, images 

were reviewed and discussed with RN





4/15: Afebrile.  S/P bilateral thoracentesis yesterday.  Patient was moved down 

to the ICU after having apneic event.  Code stroke was was called and neurology 

consulted.  No concerns for CVA, per neurology.  Patient will be taken to the 

Cath Lab today to have been intra-aortic balloon pump placed.  Discussed with 

RN.





4/16: Patient seen in ICU.  He has no complaints of chest pain, appears to be in

good spirits.  He is scheduled for high risk PCI on Monday; discussed with son 

at bedside.  He remains on heparin gtt and Levophed.





4/17: Afebrile.  Currently resting calmly in bed without complaints.  He is off 

Levophed.  Plan is for high risk PCI on Monday.  Discussed with RN.





4/18: Patient with no complaints today, denies chest pain.  Remains in good 

spirits.  Cardiology to perform high risk PCI today.  Discussed with RN.





4/19/2022


No acute events overnight.  Patient seen examined bedside.  AF and VSS while on 

IABP.  Tolerated procedure well.  Plan for IABP removal today.  Optimization of 

cardiac medication with cardiology.  Patient's chart, labs, images were reviewed

and discussed with RN





Vitals/I&O


Vitals/I&O:





                                   Vital Signs








  Date Time  Temp Pulse Resp B/P (MAP) Pulse Ox O2 Delivery O2 Flow Rate FiO2


 


4/19/22 13:00  77 16 100/55 100 Room Air  


 


4/19/22 12:00 98.5       





 98.5       


 


4/18/22 18:00       2.0 














                                    I & O   


 


 4/18/22 4/18/22 4/19/22





 15:00 23:00 07:00


 


Intake Total 50 ml 50 ml 50 ml


 


Output Total 425 ml 1540 ml 295 ml


 


Balance -375 ml -1490 ml -245 ml











Physical Exam


General:  Alert, Oriented X3, Cooperative, No acute distress


Heart:  Regular rate, Other (sinus tachycardia )


Lungs:  Other (Decreased at the bases.)


Abdomen:  Soft, No tenderness


Extremities:  No clubbing, No edema


Skin:  No rashes, No significant lesion





Labs


Labs:





Laboratory Tests








Test


 4/19/22


05:30


 


White Blood Count


 9.7 x10^3/uL


(4.0-11.0)


 


Red Blood Count


 3.15 x10^6/uL


(4.30-5.70)


 


Hemoglobin


 8.4 g/dL


(13.0-17.5)


 


Hematocrit


 25.7 %


(39.0-53.0)


 


Mean Corpuscular Volume 81 fL () 


 


Mean Corpuscular Hemoglobin 27 pg (25-35) 


 


Mean Corpuscular Hemoglobin


Concent 33 g/dL


(31-37)


 


Red Cell Distribution Width


 15.9 %


(11.5-14.5)


 


Platelet Count


 139 x10^3/uL


(140-400)


 


Neutrophils (%) (Auto) 79 % (31-73) 


 


Lymphocytes (%) (Auto) 9 % (24-48) 


 


Monocytes (%) (Auto) 9 % (0-9) 


 


Eosinophils (%) (Auto) 3 % (0-3) 


 


Basophils (%) (Auto) 1 % (0-3) 


 


Neutrophils # (Auto)


 7.7 x10^3/uL


(1.8-7.7)


 


Lymphocytes # (Auto)


 0.9 x10^3/uL


(1.0-4.8)


 


Monocytes # (Auto)


 0.9 x10^3/uL


(0.0-1.1)


 


Eosinophils # (Auto)


 0.3 x10^3/uL


(0.0-0.7)


 


Basophils # (Auto)


 0.1 x10^3/uL


(0.0-0.2)


 


Prothrombin Time


 15.8 SEC


(11.7-14.0)


 


Prothromb Time International


Ratio 1.3 (0.8-1.1) 





 


Activated Partial


Thromboplast Time 35 SEC (24-38) 





 


Sodium Level


 139 mmol/L


(136-145)


 


Potassium Level


 3.6 mmol/L


(3.5-5.1)


 


Chloride Level


 103 mmol/L


()


 


Carbon Dioxide Level


 26 mmol/L


(21-32)


 


Anion Gap 10 (6-14) 


 


Blood Urea Nitrogen


 22 mg/dL


(8-26)


 


Creatinine


 1.2 mg/dL


(0.7-1.3)


 


Estimated GFR


(Cockcroft-Gault) 58.7 





 


Glucose Level


 94 mg/dL


(70-99)


 


Calcium Level


 8.4 mg/dL


(8.5-10.1)


 


Magnesium Level


 2.0 mg/dL


(1.8-2.4)











Assessment and Plan


Assessmemt and Plan


Problems


Medical Problems:


(1) Bilateral pleural effusion


Status: Acute  





(2) Congestive heart failure (CHF)


Status: Acute  





(3) Shortness of breath


Status: Acute  











Comment


Review of Relevant


I have reviewed the following items ophelia (where applicable) has been applied.


Medications:





Current Medications








 Medications


  (Trade)  Dose


 Ordered  Sig/Joie


 Route


 PRN Reason  Start Time


 Stop Time Status Last Admin


Dose Admin


 


 Heparin Sodium/


 Sodium Chloride


  (HEPARIN for


 ARTERIAL LINE


 FLUSH)  1,000 unit  1X  ONCE


 IART


   4/18/22 15:00


 4/18/22 15:02 DC 4/18/22 15:00





 


 Heparin Sodium/


 Sodium Chloride


  (HEPARIN for


 ARTERIAL LINE


 FLUSH)  1,000 unit  1X  ONCE


 IART


   4/18/22 15:00


 4/18/22 15:02 DC 4/18/22 15:00





 


 Midazolam HCl


  (Versed)  2 mg  1X  ONCE


 IV


   4/18/22 15:00


 4/18/22 15:02 DC 4/18/22 14:52





 


 Fentanyl Citrate


  (Fentanyl 2ml


 Vial)  100 mcg  1X  ONCE


 IV


   4/18/22 15:00


 4/18/22 15:02 DC 4/18/22 14:52





 


 Iodixanol


  (Visipaque 320)  100 ml  1X  ONCE


 IART


   4/18/22 15:00


 4/18/22 15:02 DC 4/18/22 16:44





 


 Lidocaine HCl


  (Lidocaine 1%


 20ml Vial)  20 ml  1X  ONCE


 INJ


   4/18/22 15:00


 4/18/22 15:02 DC 4/18/22 14:57





 


 Bivalirudin


  (Angiomax)  250 mg  1X  ONCE


 IVP


   4/18/22 15:30


 4/18/22 15:31 DC 4/18/22 15:18





 


 Nitroglycerin


  (Nitroglycerin)  200 mcg  1X  ONCE


 IART


   4/18/22 15:45


 4/18/22 15:46 DC 4/18/22 15:45





 


 Potassium


 Bicarbonate


  (Potassium


 Effervescent


 Tablet)  20 meq  1X  ONCE


 PEG


   4/19/22 11:00


 4/19/22 11:01 DC 4/19/22 10:45














Justifications for Admission


Other Justification


CHF exacerbation











HILDA BROWN MD                  Apr 19, 2022 14:43

## 2022-04-19 NOTE — PDOC4
PROCEDURE


Successful percutaneous removal of intra-aortic balloon pump





INDICATIONS


Cardiogenic shock s/p intra-aortic balloon pump with hemodynamic improvement 

after multivessel PCI/stents placement, presently off pressors





PROCEDURAL DETAILS


Patient's right groin was prepped and draped in the usual fashion.  The intra-

aortic balloon pump was turned off and after making sure the patient was 

hemodynamically stable, this was successfully removed.  Hemostasis was achieved 

using manual compression.  Patient tolerated the procedure well.  There were no 

immediate complications.











JOHANNA HERBERT MD           Apr 19, 2022 11:53

## 2022-04-19 NOTE — PDOC
PROGRESS NOTES


Date of Service:


DATE: 4/19/22 


TIME: 19:05





Subjective


Subjective


Denied any CP or SOA





Objective


Objective





Vital Signs








  Date Time  Temp Pulse Resp B/P (MAP) Pulse Ox O2 Delivery O2 Flow Rate FiO2


 


4/19/22 18:00  91 18 98/58 99 Room Air  


 


4/19/22 16:00 98.6       





 98.6       


 


4/18/22 18:00       2.0 














Intake and Output 


 


 4/19/22





 07:00


 


Intake Total 150 ml


 


Output Total 2260 ml


 


Balance -2110 ml


 


 


 


IV Total 150 ml


 


Output Urine Total 2260 ml


 


# Bowel Movements 1











Physical Exam


Abdomen:  Soft, No tenderness


Heart:  Regular rate, Other (sinus tachycardia )


Extremities:  No clubbing, No edema


General:  Alert, Oriented X3, Cooperative, No acute distress


HEENT:  Atraumatic


Lungs:  Other (diminished bases, upper crackles )


Neuro:  Normal speech, Sensation intact


Psych/Mental Status:  Mental status NL, Mood NL


Skin:  No rashes, No significant lesion





Assessment


Assessment


1.  Acute on chronic respiratory failure with a/c CHF and bilateral pleural 

effusion.  Improved and much better compensated


2.  NSTEMI - 3v CAD s/p successful atherectomy PCI/KRYSTYNA to LAD, RCA and PCI/KRYSTYNA 

to LCX, CP free.  Continue DAPT and current secondary prevention measures


3.  Acute on chronic systolic CHF; better compensated with diuresis


4.  Cardiogenic shock: Echo with severe LV dysfunction with EF 15-20%. Improved 

and off pressors.  IABP removed successfully.  Continue GDT and repeat echo in 3

mos to evaluate need for ICD implantation


5.  PAFIB presently SR


6.  Hypertension; controlled


7.  Hyperlipidemia; statin 


8.  Diabetes, II: per IM


9.  Peripheral vascular disease.  Clinically stable


10.  H/o CVA





Plan


Plan of Care


Problems


Medical Problems:


(1) Bilateral pleural effusion


Status: Acute  





(2) Congestive heart failure (CHF)


Status: Acute  





(3) Shortness of breath


Status: Acute  











Comment


Review of Relevant


I have reviewed the following items ophelia (where applicable) has been applied.


Labs





Laboratory Tests








Test


 4/19/22


05:30


 


White Blood Count


 9.7 x10^3/uL


(4.0-11.0)


 


Red Blood Count


 3.15 x10^6/uL


(4.30-5.70)


 


Hemoglobin


 8.4 g/dL


(13.0-17.5)


 


Hematocrit


 25.7 %


(39.0-53.0)


 


Mean Corpuscular Volume 81 fL () 


 


Mean Corpuscular Hemoglobin 27 pg (25-35) 


 


Mean Corpuscular Hemoglobin


Concent 33 g/dL


(31-37)


 


Red Cell Distribution Width


 15.9 %


(11.5-14.5)


 


Platelet Count


 139 x10^3/uL


(140-400)


 


Neutrophils (%) (Auto) 79 % (31-73) 


 


Lymphocytes (%) (Auto) 9 % (24-48) 


 


Monocytes (%) (Auto) 9 % (0-9) 


 


Eosinophils (%) (Auto) 3 % (0-3) 


 


Basophils (%) (Auto) 1 % (0-3) 


 


Neutrophils # (Auto)


 7.7 x10^3/uL


(1.8-7.7)


 


Lymphocytes # (Auto)


 0.9 x10^3/uL


(1.0-4.8)


 


Monocytes # (Auto)


 0.9 x10^3/uL


(0.0-1.1)


 


Eosinophils # (Auto)


 0.3 x10^3/uL


(0.0-0.7)


 


Basophils # (Auto)


 0.1 x10^3/uL


(0.0-0.2)


 


Prothrombin Time


 15.8 SEC


(11.7-14.0)


 


Prothromb Time International


Ratio 1.3 (0.8-1.1) 





 


Activated Partial


Thromboplast Time 35 SEC (24-38) 





 


Sodium Level


 139 mmol/L


(136-145)


 


Potassium Level


 3.6 mmol/L


(3.5-5.1)


 


Chloride Level


 103 mmol/L


()


 


Carbon Dioxide Level


 26 mmol/L


(21-32)


 


Anion Gap 10 (6-14) 


 


Blood Urea Nitrogen


 22 mg/dL


(8-26)


 


Creatinine


 1.2 mg/dL


(0.7-1.3)


 


Estimated GFR


(Cockcroft-Gault) 58.7 





 


Glucose Level


 94 mg/dL


(70-99)


 


Calcium Level


 8.4 mg/dL


(8.5-10.1)


 


Magnesium Level


 2.0 mg/dL


(1.8-2.4)








Microbiology


4/14/22 Gram Stain - Final, Complete


          


4/14/22 Aerobic and Anaerobic Culture - Final, Complete


Medications





Current Medications


Potassium Bicarbonate (Potassium Effervescent Tablet) 20 meq 1X  ONCE PEG  Last 

administered on 4/19/22at 10:45;  Start 4/19/22 at 11:00;  Stop 4/19/22 at 

11:01;  Status DC


Vitals/I & O





Vital Sign - Last 24 Hours








 4/18/22 4/18/22 4/18/22 4/18/22





 20:00 20:21 21:00 22:00


 


Temp   98.0 





   98.0 


 


Pulse  78 78 81


 


Resp  18 18 18


 


B/P (MAP)  115/56 128/70 117/53


 


Pulse Ox  99 98 99


 


O2 Delivery Room Air Room Air Room Air Room Air


 


    





    





 4/18/22 4/19/22 4/19/22 4/19/22





 23:00 00:00 00:00 01:00


 


Temp   97.6 





   97.6 


 


Pulse 82  73 79


 


Resp 18  18 18


 


B/P (MAP) 119/62  121/64 131/62


 


Pulse Ox 95  99 95


 


O2 Delivery Room Air Room Air Room Air Room Air


 


    





    





 4/19/22 4/19/22 4/19/22 4/19/22





 02:00 03:00 04:15 05:00


 


Temp    98.8





    98.8


 


Pulse 78 74  80


 


Resp 18 18  18


 


B/P (MAP) 113/56 122/63  112/60


 


Pulse Ox 95 100  98


 


O2 Delivery Room Air Room Air Room Air Room Air


 


    





    





 4/19/22 4/19/22 4/19/22 4/19/22





 06:00 07:00 08:00 08:00


 


Temp    98.5





    98.5


 


Pulse 80 79  74


 


Resp 18 18  20


 


B/P (MAP) 112/66 103/58  111/66


 


Pulse Ox 97 98  99


 


O2 Delivery Room Air Room Air Room Air Room Air


 


    





    





 4/19/22 4/19/22 4/19/22 4/19/22





 09:00 09:21 10:00 11:00


 


Pulse 88 87 84 77


 


Resp 21 21 21


 


B/P (MAP) 135/67 135/67 127/75 125/71


 


Pulse Ox 100  100 100


 


O2 Delivery Room Air  Room Air Room Air





 4/19/22 4/19/22 4/19/22 4/19/22





 11:45 12:00 12:00 12:15


 


Temp   98.5 





   98.5 


 


Pulse 92  96 92


 


Resp 21  14 17


 


B/P (MAP) 95/56  107/62 100/51


 


Pulse Ox 100  100 100


 


O2 Delivery Room Air Room Air Room Air Room Air


 


    





    





 4/19/22 4/19/22 4/19/22 4/19/22





 12:30 13:00 13:30 14:00


 


Pulse 96 92 92 96


 


Resp 21 16 18 16


 


B/P (MAP) 102/62 100/55 92/56 94/57


 


Pulse Ox 100 100 100 100


 


O2 Delivery Room Air Room Air Room Air Room Air





 4/19/22 4/19/22 4/19/22 4/19/22





 14:00 15:00 16:00 16:00


 


Temp    98.6





    98.6


 


Pulse 92 93  96


 


Resp 18 18  18


 


B/P (MAP) 92/58 96/54  96/66


 


Pulse Ox 99 97  98


 


O2 Delivery Room Air Room Air Room Air Room Air


 


    





    





 4/19/22 4/19/22  





 17:00 18:00  


 


Pulse 88 91  


 


Resp 18 18  


 


B/P (MAP) 102/55 98/58  


 


Pulse Ox 99 99  


 


O2 Delivery Room Air Room Air  














Intake and Output   


 


 4/18/22 4/18/22 4/19/22





 15:00 23:00 07:00


 


Intake Total 50 ml 50 ml 50 ml


 


Output Total 425 ml 1540 ml 295 ml


 


Balance -375 ml -1490 ml -245 ml

















JOHANNA HERBERT MD           Apr 19, 2022 19:10

## 2022-04-19 NOTE — PDOC
PULMONARY PROGRESS NOTES


DATE: 4/19/22 


TIME: 08:04


Subjective


Patient off of oxygen supplementation


No new complaints





Status post cardiac cath.  Severe three-vessel coronary artery disease and 

severe cardiomyopathy with an EF of 15%.  Status post intra-aortic balloon pump.

 


Status post bilateral thoracentesis at 4/14/2022


Vitals





Vital Signs








  Date Time  Temp Pulse Resp B/P (MAP) Pulse Ox O2 Delivery O2 Flow Rate FiO2


 


4/19/22 07:00  79 18 103/58 98 Room Air  


 


4/19/22 05:00 98.8       





 98.8       


 


4/18/22 18:00       2.0 








ROS:  No Nausea, No Chest Pain, No Abdominal Pain, No Increase Cough


General:  Alert, No acute distress


Lungs:  Other (Decreased at the bases.)


Cardiovascular:  S1, S2


Abdomen:  Soft, Non-tender


Extremities:  No Edema


Skin:  Warm


Labs





Laboratory Tests








Test


 4/17/22


10:00 4/17/22


11:40 4/17/22


17:08 4/17/22


23:05


 


Activated Partial


Thromboplast Time 55 SEC (24-38) 


 


 69 SEC (24-38) 


 89 SEC (24-38) 





 


White Blood Count


 


 6.3 x10^3/uL


(4.0-11.0) 


 





 


Red Blood Count


 


 3.04 x10^6/uL


(4.30-5.70) 


 





 


Hemoglobin


 


 8.1 g/dL


(13.0-17.5) 


 





 


Hematocrit


 


 24.9 %


(39.0-53.0) 


 





 


Mean Corpuscular Volume  82 fL ()   


 


Mean Corpuscular Hemoglobin  27 pg (25-35)   


 


Mean Corpuscular Hemoglobin


Concent 


 33 g/dL


(31-37) 


 





 


Red Cell Distribution Width


 


 16.2 %


(11.5-14.5) 


 





 


Platelet Count


 


 143 x10^3/uL


(140-400) 


 





 


Neutrophils (%) (Auto)  73 % (31-73)   


 


Lymphocytes (%) (Auto)  12 % (24-48)   


 


Monocytes (%) (Auto)  10 % (0-9)   


 


Eosinophils (%) (Auto)  4 % (0-3)   


 


Basophils (%) (Auto)  1 % (0-3)   


 


Neutrophils # (Auto)


 


 4.6 x10^3/uL


(1.8-7.7) 


 





 


Lymphocytes # (Auto)


 


 0.8 x10^3/uL


(1.0-4.8) 


 





 


Monocytes # (Auto)


 


 0.6 x10^3/uL


(0.0-1.1) 


 





 


Eosinophils # (Auto)


 


 0.3 x10^3/uL


(0.0-0.7) 


 





 


Basophils # (Auto)


 


 0.1 x10^3/uL


(0.0-0.2) 


 





 


Sodium Level


 


 139 mmol/L


(136-145) 


 





 


Potassium Level


 


 3.5 mmol/L


(3.5-5.1) 


 





 


Chloride Level


 


 103 mmol/L


() 


 





 


Carbon Dioxide Level


 


 28 mmol/L


(21-32) 


 





 


Anion Gap  8 (6-14)   


 


Blood Urea Nitrogen


 


 31 mg/dL


(8-26) 


 





 


Creatinine


 


 1.4 mg/dL


(0.7-1.3) 


 





 


Estimated GFR


(Cockcroft-Gault) 


 49.1 


 


 





 


Glucose Level


 


 156 mg/dL


(70-99) 


 





 


Calcium Level


 


 8.4 mg/dL


(8.5-10.1) 


 





 


Test


 4/18/22


05:15 4/19/22


05:30 


 





 


White Blood Count


 7.4 x10^3/uL


(4.0-11.0) 9.7 x10^3/uL


(4.0-11.0) 


 





 


Red Blood Count


 2.97 x10^6/uL


(4.30-5.70) 3.15 x10^6/uL


(4.30-5.70) 


 





 


Hemoglobin


 7.8 g/dL


(13.0-17.5) 8.4 g/dL


(13.0-17.5) 


 





 


Hematocrit


 24.2 %


(39.0-53.0) 25.7 %


(39.0-53.0) 


 





 


Mean Corpuscular Volume 82 fL ()  81 fL ()   


 


Mean Corpuscular Hemoglobin 27 pg (25-35)  27 pg (25-35)   


 


Mean Corpuscular Hemoglobin


Concent 32 g/dL


(31-37) 33 g/dL


(31-37) 


 





 


Red Cell Distribution Width


 16.0 %


(11.5-14.5) 15.9 %


(11.5-14.5) 


 





 


Platelet Count


 135 x10^3/uL


(140-400) 139 x10^3/uL


(140-400) 


 





 


Activated Partial


Thromboplast Time 92 SEC (24-38) 


 35 SEC (24-38) 


 


 





 


Neutrophils (%) (Auto)  79 % (31-73)   


 


Lymphocytes (%) (Auto)  9 % (24-48)   


 


Monocytes (%) (Auto)  9 % (0-9)   


 


Eosinophils (%) (Auto)  3 % (0-3)   


 


Basophils (%) (Auto)  1 % (0-3)   


 


Neutrophils # (Auto)


 


 7.7 x10^3/uL


(1.8-7.7) 


 





 


Lymphocytes # (Auto)


 


 0.9 x10^3/uL


(1.0-4.8) 


 





 


Monocytes # (Auto)


 


 0.9 x10^3/uL


(0.0-1.1) 


 





 


Eosinophils # (Auto)


 


 0.3 x10^3/uL


(0.0-0.7) 


 





 


Basophils # (Auto)


 


 0.1 x10^3/uL


(0.0-0.2) 


 





 


Prothrombin Time


 


 15.8 SEC


(11.7-14.0) 


 





 


Prothromb Time International


Ratio 


 1.3 (0.8-1.1) 


 


 





 


Sodium Level


 


 139 mmol/L


(136-145) 


 





 


Potassium Level


 


 3.6 mmol/L


(3.5-5.1) 


 





 


Chloride Level


 


 103 mmol/L


() 


 





 


Carbon Dioxide Level


 


 26 mmol/L


(21-32) 


 





 


Anion Gap  10 (6-14)   


 


Blood Urea Nitrogen


 


 22 mg/dL


(8-26) 


 





 


Creatinine


 


 1.2 mg/dL


(0.7-1.3) 


 





 


Estimated GFR


(Cockcroft-Gault) 


 58.7 


 


 





 


Glucose Level


 


 94 mg/dL


(70-99) 


 





 


Calcium Level


 


 8.4 mg/dL


(8.5-10.1) 


 











Laboratory Tests








Test


 4/19/22


05:30


 


White Blood Count


 9.7 x10^3/uL


(4.0-11.0)


 


Red Blood Count


 3.15 x10^6/uL


(4.30-5.70)


 


Hemoglobin


 8.4 g/dL


(13.0-17.5)


 


Hematocrit


 25.7 %


(39.0-53.0)


 


Mean Corpuscular Volume 81 fL () 


 


Mean Corpuscular Hemoglobin 27 pg (25-35) 


 


Mean Corpuscular Hemoglobin


Concent 33 g/dL


(31-37)


 


Red Cell Distribution Width


 15.9 %


(11.5-14.5)


 


Platelet Count


 139 x10^3/uL


(140-400)


 


Neutrophils (%) (Auto) 79 % (31-73) 


 


Lymphocytes (%) (Auto) 9 % (24-48) 


 


Monocytes (%) (Auto) 9 % (0-9) 


 


Eosinophils (%) (Auto) 3 % (0-3) 


 


Basophils (%) (Auto) 1 % (0-3) 


 


Neutrophils # (Auto)


 7.7 x10^3/uL


(1.8-7.7)


 


Lymphocytes # (Auto)


 0.9 x10^3/uL


(1.0-4.8)


 


Monocytes # (Auto)


 0.9 x10^3/uL


(0.0-1.1)


 


Eosinophils # (Auto)


 0.3 x10^3/uL


(0.0-0.7)


 


Basophils # (Auto)


 0.1 x10^3/uL


(0.0-0.2)


 


Prothrombin Time


 15.8 SEC


(11.7-14.0)


 


Prothromb Time International


Ratio 1.3 (0.8-1.1) 





 


Activated Partial


Thromboplast Time 35 SEC (24-38) 





 


Sodium Level


 139 mmol/L


(136-145)


 


Potassium Level


 3.6 mmol/L


(3.5-5.1)


 


Chloride Level


 103 mmol/L


()


 


Carbon Dioxide Level


 26 mmol/L


(21-32)


 


Anion Gap 10 (6-14) 


 


Blood Urea Nitrogen


 22 mg/dL


(8-26)


 


Creatinine


 1.2 mg/dL


(0.7-1.3)


 


Estimated GFR


(Cockcroft-Gault) 58.7 





 


Glucose Level


 94 mg/dL


(70-99)


 


Calcium Level


 8.4 mg/dL


(8.5-10.1)








Medications





Active Scripts








 Medications  Dose


 Route/Sig


 Max Daily Dose Days Date Category


 


 Flomax


  (Tamsulosin Hcl)


 0.4 Mg Cap.er.24h  1 Cap


 PO BID


   4/13/22 Reported


 


 Furosemide 40 Mg


 Tablet  1 Tab


 PO DAILY


  30 2/7/22 Rx


 


 Metoprolol


 Succinate ( Xl )


  (Metoprolol


 Succinate) 25 Mg


 Tab.er.24h  25 Mg


 PO DAILY


  30 2/7/22 Rx


 


 Eliquis


  (Apixaban) 5 Mg


 Tablet  5 Mg


 PO BID


  30 2/7/22 Rx


 


 Refresh


 Lacri-Lube


 Ointment (Mineral


 Oil/Petrolatum,White)


 3.5 Gm Oint...g.  3.5 Gm


 OU DAILY


   2/4/22 Reported


 


 Atorvastatin


 Calcium 20 Mg


 Tablet  2 Tab


 PO HS


   2/4/22 Reported


 


 Omeprazole 20 Mg


 Capsule.dr  1 Cap


 PO DAILY


   2/4/22 Reported


 


 Lubricating Plus


  (Carboxymethylcellulose


 Sodium) 1 Each


 Droperette  1


 OU DAILY


   2/4/22 Reported


 


 Fluticasone


 Propionate Nasal


 Spray


  (Fluticasone


 Propionate) 16 Gm


 Spray.susp  1 Sprays


 NS BID


   2/4/22 Reported


 


 Proair Hfa


 Inhaler


  (Albuterol


 Sulfate) 8.5 Gm


 Hfa.aer.ad  1 Puff


 INH PRN Q6HRS PRN


   7/25/18 Reported


 


 Aspirin 325 Mg


 Tablet  1 Tab


 PO DAILY


   8/3/15 Reported








Comments


Chest x-ray reviewed 4/16/2022.


Evidence of interstitial edema.





Chest x-ray reviewed 4/15/2022.


Diffuse bilateral interstitial infiltrates





Impression


.


1.  Acute hypoxic respiratory failure secondary to acute on chronic systolic 


heart failure with bilateral pleural effusions.  Status post thoracentesis 

4/14/2022


2.  Low-grade fever.  Influenza and Covid negative.  Possible pneumonia


3.  Abnormal CT chest with bilateral pleural effusion, which has been persistent


and moderate.  Status post bilateral thoracentesis 4/14/2022


4.  Underlying chronic obstructive pulmonary disease with 30 years of 

tobaccoism.


5.  Hypotensin secondary to cardiogenic shock


6.  Encephalopathy,  multifactorial


7.  Status post cardiac cath.  Severe three-vessel coronary artery disease and 

severe cardiomyopathy.  Status post intra-aortic balloon pump.


8.  Cardiogenic shock.





Cardiac cath report.





Conclusion


1.  Cardiogenic shock with acute on chronic systolic and diastolic heart failure


2.  Severe three-vessel coronary artery disease


3.  Successful placement of a 7.5 [place] 40 cc Intermedic balloon pump for 

cardiac shock








Recommendations


1.  Continue medication optimization.  Given the patient's renal failure and 

significant cardiomyopathy with three-vessel coronary artery disease further 

intervention was deferred today in an effort to discuss the risks and benefits 

of high risk PCI with the patient's family and the patient's primary 

cardiologist Dr. Ovalle





Signed by : Avery Lester, 


Electronically Approved : 04/15/2022 17:49:28





Plan


.


Updated 4/19


Continue current support


Intra-aortic balloon pump per cardiology


Patient off of oxygen


Continue empiric antibiotics








Updated 4/18/2022


1.  Pulmonary status stable.  On room air.  Status post thoracentesis.  Follow 

pleural fluid analysis.  Eliquis on hold.  On heparin per protocol.


2.  Continue present oxygen. 


3.  Ruled out for influenza and COVID.  Pneumonia is likely possibility for the 

source of fever.  On antibiotics.  Clinically improving


4.  Status post cardiac cath.  Currently on intra-aortic balloon pump.  Severe 

cardiomyopathy and severe three-vessel coronary artery disease.  Cardiology to 

consider PCI later today.


5.  Normal pro calcitonin 


6.  Cardiology recommendations.


7.  Discussed with SACHA MATAMOROS MD              Apr 19, 2022 08:04

## 2022-04-20 VITALS — DIASTOLIC BLOOD PRESSURE: 60 MMHG | SYSTOLIC BLOOD PRESSURE: 99 MMHG

## 2022-04-20 VITALS — SYSTOLIC BLOOD PRESSURE: 85 MMHG | DIASTOLIC BLOOD PRESSURE: 53 MMHG

## 2022-04-20 VITALS — SYSTOLIC BLOOD PRESSURE: 103 MMHG | DIASTOLIC BLOOD PRESSURE: 65 MMHG

## 2022-04-20 VITALS — SYSTOLIC BLOOD PRESSURE: 93 MMHG | DIASTOLIC BLOOD PRESSURE: 57 MMHG

## 2022-04-20 VITALS — DIASTOLIC BLOOD PRESSURE: 60 MMHG | SYSTOLIC BLOOD PRESSURE: 98 MMHG

## 2022-04-20 LAB
ERYTHROCYTE [DISTWIDTH] IN BLOOD BY AUTOMATED COUNT: 16.1 % (ref 11.5–14.5)
HCT VFR BLD CALC: 26.7 % (ref 39–53)
HGB BLD-MCNC: 8.7 G/DL (ref 13–17.5)
MCH RBC QN AUTO: 27 PG (ref 25–35)
MCHC RBC AUTO-ENTMCNC: 33 G/DL (ref 31–37)
MCV RBC AUTO: 82 FL (ref 79–100)
PLATELET # BLD AUTO: 164 X10^3/UL (ref 140–400)
RBC # BLD AUTO: 3.27 X10^6/UL (ref 4.3–5.7)
WBC # BLD AUTO: 9.4 X10^3/UL (ref 4–11)

## 2022-04-20 RX ADMIN — PIPERACILLIN SODIUM AND TAZOBACTAM SODIUM SCH MLS/HR: 3; .375 INJECTION, POWDER, LYOPHILIZED, FOR SOLUTION INTRAVENOUS at 00:26

## 2022-04-20 RX ADMIN — FLUTICASONE PROPIONATE SCH SPRAY: 50 SPRAY, METERED NASAL at 20:58

## 2022-04-20 RX ADMIN — CLOPIDOGREL BISULFATE SCH MG: 75 TABLET ORAL at 11:04

## 2022-04-20 RX ADMIN — Medication SCH CAP: at 11:05

## 2022-04-20 RX ADMIN — PIPERACILLIN SODIUM AND TAZOBACTAM SODIUM SCH MLS/HR: 3; .375 INJECTION, POWDER, LYOPHILIZED, FOR SOLUTION INTRAVENOUS at 06:59

## 2022-04-20 RX ADMIN — ATORVASTATIN CALCIUM SCH MG: 20 TABLET, FILM COATED ORAL at 20:59

## 2022-04-20 RX ADMIN — DICLOFENAC SODIUM SCH APP: 10 GEL TOPICAL at 11:06

## 2022-04-20 RX ADMIN — TAMSULOSIN HYDROCHLORIDE SCH MG: 0.4 CAPSULE ORAL at 20:59

## 2022-04-20 RX ADMIN — TAMSULOSIN HYDROCHLORIDE SCH MG: 0.4 CAPSULE ORAL at 11:05

## 2022-04-20 RX ADMIN — FUROSEMIDE SCH MG: 40 TABLET ORAL at 09:00

## 2022-04-20 RX ADMIN — DICLOFENAC SODIUM SCH APP: 10 GEL TOPICAL at 21:02

## 2022-04-20 RX ADMIN — ASPIRIN SCH MG: 81 TABLET, COATED ORAL at 17:07

## 2022-04-20 RX ADMIN — Medication SCH CAP: at 20:59

## 2022-04-20 RX ADMIN — METOPROLOL SUCCINATE SCH MG: 25 TABLET, EXTENDED RELEASE ORAL at 11:05

## 2022-04-20 RX ADMIN — FLUTICASONE PROPIONATE SCH SPRAY: 50 SPRAY, METERED NASAL at 11:05

## 2022-04-20 NOTE — PDOC
[employer] PROGRESS NOTE


Date of Service


DOS:


DATE: 4/20/22 


TIME: 10:52





Chief Complaint


Chief Complaint


Assessment/Plan


Severe three-vessel CAD found on [facility] 4/15/2022 and underwent IABP support.  

Status post high risk PCI on 4/18/2022 with atherectomy and KRYSTYNA stent placement 

to LAD and RCA, KRYSTYNA to OM2 of LCA


Temporary transvenous pacer


Acute hypoxic respiratory failure


Bilateral pleural effusions status post bilateral thoracentesis 4/14/2022,


Acute on chronic CHF exacerbation


Costochondritis


History of CHF with LVEF of 15 to 20% on echo 2/7/2022


History of CAD


History of MIRIAM


History of dyslipidemia


History of hypertension


History of atrial fibrillation on Eliquis








Admit to hospitalist service for further management


Cardiology consult for CHF management


Pulmonology consult for possible thoracentesis


Hold anticoagulation for now patient's chart, labs, images were reviewed and 

discussed with RN


Fluid and sodium restriction


Lovenox for DVT prophylaxis


Protonix GI prophylaxis


ADA diet


CODE STATUS full


Discussed with RN and SW


Disposition inpatient management as above


DPOA: Brisa Cabrera





History of Present Illness


History of Present Illness


76-year-old male with multiple comorbidities who comes in with shortness of 

breath and chest pain.  Shortness of breath has been going on for for few 

days.Patient's room air sat while I was in the room was 92% but he is quite 

tachypneic and has to stop frequently during the question-and-answer session to 

catch his breath.  Denies any fevers, abdominal pain, diarrhea, dysuria or 

syncope.





4/14/2022


No acute events overnight.  Patient seen examined bedside.  Saturating 99% on 2 

L nasal cannula.  Patient had thoracentesis done this morning with 800 cc of 

fluid removed.  Will await for fluid analysis.  Patient's chart, labs, images 

were reviewed and discussed with RN





4/15: Afebrile.  S/P bilateral thoracentesis yesterday.  Patient was moved down 

to the ICU after having apneic event.  Code stroke was was called and neurology 

consulted.  No concerns for CVA, per neurology.  Patient will be taken to the 

Cath Lab today to have been intra-aortic balloon pump placed.  Discussed with 

RN.





4/16: Patient seen in ICU.  He has no complaints of chest pain, appears to be in

good spirits.  He is scheduled for high risk PCI on Monday; discussed with son 

at bedside.  He remains on heparin gtt and Levophed.





4/17: Afebrile.  Currently resting calmly in bed without complaints.  He is off 

Levophed.  Plan is for high risk PCI on Monday.  Discussed with RN.





4/18: Patient with no complaints today, denies chest pain.  Remains in good 

spirits.  Cardiology to perform high risk PCI today.  Discussed with RN.





4/19/2022


No acute events overnight.  Patient seen examined bedside.  AF and VSS while on 

IABP.  Tolerated procedure well.  Plan for IABP removal today.  Optimization of 

cardiac medication with cardiology.  Patient's chart, labs, images were reviewed

and discussed with RN





4/20/2022


No acute events overnight.  Patient seen examined bedside.  AFVSS.  IABP removed

without any major complications.  Tolerating diet.  Will have PT OT reassess 

patient for home needs.  Patient's chart, labs, images were reviewed and 

discussed with RN





Vitals/I&O


Vitals/I&O:





                                   Vital Signs








  Date Time  Temp Pulse Resp B/P (MAP) Pulse Ox O2 Delivery O2 Flow Rate FiO2


 


4/20/22 04:00 97.9 91 20 93/57 (69) 98 Room Air  





 97.9       














                                    I & O   


 


 4/19/22 4/19/22 4/20/22





 14:59 22:59 06:59


 


Intake Total 270 ml 480 ml 320 ml


 


Output Total 255 ml 475 ml 325 ml


 


Balance 15 ml 5 ml -5 ml











Physical Exam


General:  Alert, Oriented X3, Cooperative, No acute distress


Heart:  Regular rate, Other (sinus tachycardia )


Lungs:  Other (Decreased at the bases.)


Abdomen:  Soft, No tenderness


Extremities:  No clubbing, No edema


Skin:  No rashes, No significant lesion





Labs


Labs:





Laboratory Tests








Test


 4/20/22


05:02


 


White Blood Count


 9.4 x10^3/uL


(4.0-11.0)


 


Red Blood Count


 3.27 x10^6/uL


(4.30-5.70)


 


Hemoglobin


 8.7 g/dL


(13.0-17.5)


 


Hematocrit


 26.7 %


(39.0-53.0)


 


Mean Corpuscular Volume 82 fL () 


 


Mean Corpuscular Hemoglobin 27 pg (25-35) 


 


Mean Corpuscular Hemoglobin


Concent 33 g/dL


(31-37)


 


Red Cell Distribution Width


 16.1 %


(11.5-14.5)


 


Platelet Count


 164 x10^3/uL


(140-400)











Assessment and Plan


Assessmemt and Plan


Problems


Medical Problems:


(1) Bilateral pleural effusion


Status: Acute  





(2) Congestive heart failure (CHF)


Status: Acute  





(3) Shortness of breath


Status: Acute  











Comment


Review of Relevant


I have reviewed the following items ophelia (where applicable) has been applied.


Medications:





Current Medications








 Medications


  (Trade)  Dose


 Ordered  Sig/Joie


 Route


 PRN Reason  Start Time


 Stop Time Status Last Admin


Dose Admin


 


 Potassium


 Bicarbonate


  (Potassium


 Effervescent


 Tablet)  20 meq  1X  ONCE


 PEG


   4/19/22 11:00


 4/19/22 11:01 DC 4/19/22 10:45














Justifications for Admission


Other Justification


CHF exacerbation











HILDA BROWN MD                  Apr 20, 2022 10:53

## 2022-04-20 NOTE — PDOC
DILLON MARTIN APRN 4/20/22 1322:


CARDIO Progress Notes


Date and Time


Date of Service


4/20/22


Time of Evaluation


1310





Subjective


Subjective:  No Chest Pain, No Palpitations, No Dizziness





Vitals


Vitals





Vital Signs








  Date Time  Temp Pulse Resp B/P (MAP) Pulse Ox O2 Delivery O2 Flow Rate FiO2


 


4/20/22 12:00 97.8 96 20 98/60 (73) 99 Room Air  





 97.8       








Weight


Weight [ ]





Input and Output


Intake and Output











Intake and Output 


 


 4/20/22





 07:00


 


Intake Total 1070 ml


 


Output Total 1055 ml


 


Balance 15 ml


 


 


 


Intake Oral 970 ml


 


IV Total 100 ml


 


Output Urine Total 1055 ml


 


# Bowel Movements 1











Laboratory


Labs





Laboratory Tests








Test


 4/20/22


05:02


 


White Blood Count


 9.4 x10^3/uL


(4.0-11.0)


 


Red Blood Count


 3.27 x10^6/uL


(4.30-5.70)


 


Hemoglobin


 8.7 g/dL


(13.0-17.5)


 


Hematocrit


 26.7 %


(39.0-53.0)


 


Mean Corpuscular Volume 82 fL () 


 


Mean Corpuscular Hemoglobin 27 pg (25-35) 


 


Mean Corpuscular Hemoglobin


Concent 33 g/dL


(31-37)


 


Red Cell Distribution Width


 16.1 %


(11.5-14.5)


 


Platelet Count


 164 x10^3/uL


(140-400)











Microbiology


Micro





Microbiology


4/14/22 Gram Stain - Final, Complete


          


4/14/22 Aerobic and Anaerobic Culture - Final, Complete





Physical Exam


HEENT:  Neck Supple W Full Motion


Chest:  Symmetric


LUNGS:  Other (diminished bases)


Heart:  RRR (ST)


Abdomen:  Soft N/T


Extremities:  No Edema


Neurology:  alert, oriented, follow commands





Assessment


Assessment


1.  Acute on chronic respiratory failure with a/c CHF, PNA, and bilateral 

pleural effusion. improved s/p thoracentesis with 800cc off bilaterally.


2.  Chest pain, mixed features; AMI ruled out.


3.  Acute on chronic systolic CHF; s/p IV Lasix 


4.  Ischemic cardiomyopathy, cardiogenic shock; improved. s/p IABP removal. LVEF

15-20%. 


5.  PAFIB with RVR; on metoprolol for rate control. Eliquis held for PCI


6.  3V CAD s/p successful high risk orbital atherectomy/PCI/KRYSTYNA to LAD and RCA, 

and successful PCI/KRYSTYNA second OM branch of LCx


7.  Hypertension; low end


8.  Hyperlipidemia; statin 


9.  Diabetes, II


10.  Peripheral vascular disease.  Patient denies claudication symptoms.


11.  H/o CVA


12.  TERRY; cardiorenal.  improved 








Recommendations





Secondary prevention


Continue Plavix. Add ASA


Will discuss resumption of Eliquis with primary cardiologist. 


Risk stratification modification 


Cardiac rehab referral 


Continue Toprol for rate control


Lasix therapy


No ACEi/ARB with low end blood pressure


Supportive care





Justicifation of Admission Dx:


Justifications for Admission:


Justification of Admission Dx:  Yes





JOHANNA HERBERT MD 4/20/22 9547:


CARDIO Progress Notes


Assessment


Assessment


Patient seen and examined.  Agree with NP's assessment and plan.


Non-STEMI s/p high risk multivessel PCI/KRYSTYNA to LAD and RCA and PCI/KRYSTYNA to 

OM/LCx, presently chest pain-free


Cardiogenic shock, acute on chronic systolic heart failure, better compensated 

and off pressors and IABP


Agree with resuming Eliquis.  Continue low-dose aspirin for 1 month then stop.  

Continue Plavix.


Cardiac rehabilitation referral.











DILLON MARTIN           Apr 20, 2022 13:22


JOHANNA HERBERT MD           Apr 20, 2022 16:57

## 2022-04-20 NOTE — PDOC
PROGRESS NOTES


Date of Service


DATE: 4/20/22 


TIME: 08:44


Assessment


Problems


Medical Problems:


(1) Bilateral pleural effusion


Status: Acute  





(2) Congestive heart failure (CHF)


Status: Acute  





(3) Shortness of breath


Status: Acute  





Event on 4/14 was Cheyne-Schroeder respiration from his heart failure and 

hypotension.  Add to this his history of sleep apnea.  Also interesting is his 

history of nightmares and sleep issues, he says that he has had trouble like 

this before when he is falling asleep.  I do not see any evidence that he has 

had a stroke, transient ischemic attack, or seizure.  He has been fully worked 

up for this in the past.  No additional spells





Plan


No additional neurological studies needed


Neurology signs off


Treatment of medical issues.


Subjective


No complaints


Objective





Vital Signs








  Date Time  Temp Pulse Resp B/P (MAP) Pulse Ox O2 Delivery O2 Flow Rate FiO2


 


4/20/22 04:00 97.9 91 20 93/57 (69) 98 Room Air  





 97.9       














Intake and Output 


 


 4/20/22





 07:00


 


Intake Total 1070 ml


 


Output Total 1055 ml


 


Balance 15 ml


 


 


 


Intake Oral 970 ml


 


IV Total 100 ml


 


Output Urine Total 1055 ml


 


# Bowel Movements 1








PHYSICAL EXAM


Alert. Oriented to time, place and person.


PERRL.


EOMI.


CN: no focal findings.


Muscle tone: normal.


Muscle strength: 5/5


DTR: 2+


Plantar reflex: Flexor


Gait: not examined in bed.


Sensory exam: no abnormal findings.


No cerebellar signs elicited.


Review of Relevant


I have reviewed the following items ophelia (where applicable) has been applied.


Labs





Laboratory Tests








Test


 4/19/22


05:30 4/20/22


05:02


 


White Blood Count


 9.7 x10^3/uL


(4.0-11.0) 9.4 x10^3/uL


(4.0-11.0)


 


Red Blood Count


 3.15 x10^6/uL


(4.30-5.70) 3.27 x10^6/uL


(4.30-5.70)


 


Hemoglobin


 8.4 g/dL


(13.0-17.5) 8.7 g/dL


(13.0-17.5)


 


Hematocrit


 25.7 %


(39.0-53.0) 26.7 %


(39.0-53.0)


 


Mean Corpuscular Volume 81 fL ()  82 fL () 


 


Mean Corpuscular Hemoglobin 27 pg (25-35)  27 pg (25-35) 


 


Mean Corpuscular Hemoglobin


Concent 33 g/dL


(31-37) 33 g/dL


(31-37)


 


Red Cell Distribution Width


 15.9 %


(11.5-14.5) 16.1 %


(11.5-14.5)


 


Platelet Count


 139 x10^3/uL


(140-400) 164 x10^3/uL


(140-400)


 


Neutrophils (%) (Auto) 79 % (31-73)  


 


Lymphocytes (%) (Auto) 9 % (24-48)  


 


Monocytes (%) (Auto) 9 % (0-9)  


 


Eosinophils (%) (Auto) 3 % (0-3)  


 


Basophils (%) (Auto) 1 % (0-3)  


 


Neutrophils # (Auto)


 7.7 x10^3/uL


(1.8-7.7) 





 


Lymphocytes # (Auto)


 0.9 x10^3/uL


(1.0-4.8) 





 


Monocytes # (Auto)


 0.9 x10^3/uL


(0.0-1.1) 





 


Eosinophils # (Auto)


 0.3 x10^3/uL


(0.0-0.7) 





 


Basophils # (Auto)


 0.1 x10^3/uL


(0.0-0.2) 





 


Prothrombin Time


 15.8 SEC


(11.7-14.0) 





 


Prothromb Time International


Ratio 1.3 (0.8-1.1) 


 





 


Activated Partial


Thromboplast Time 35 SEC (24-38) 


 





 


Sodium Level


 139 mmol/L


(136-145) 





 


Potassium Level


 3.6 mmol/L


(3.5-5.1) 





 


Chloride Level


 103 mmol/L


() 





 


Carbon Dioxide Level


 26 mmol/L


(21-32) 





 


Anion Gap 10 (6-14)  


 


Blood Urea Nitrogen


 22 mg/dL


(8-26) 





 


Creatinine


 1.2 mg/dL


(0.7-1.3) 





 


Estimated GFR


(Cockcroft-Gault) 58.7 


 





 


Glucose Level


 94 mg/dL


(70-99) 





 


Calcium Level


 8.4 mg/dL


(8.5-10.1) 





 


Magnesium Level


 2.0 mg/dL


(1.8-2.4) 











Laboratory Tests








Test


 4/20/22


05:02


 


White Blood Count


 9.4 x10^3/uL


(4.0-11.0)


 


Red Blood Count


 3.27 x10^6/uL


(4.30-5.70)


 


Hemoglobin


 8.7 g/dL


(13.0-17.5)


 


Hematocrit


 26.7 %


(39.0-53.0)


 


Mean Corpuscular Volume 82 fL () 


 


Mean Corpuscular Hemoglobin 27 pg (25-35) 


 


Mean Corpuscular Hemoglobin


Concent 33 g/dL


(31-37)


 


Red Cell Distribution Width


 16.1 %


(11.5-14.5)


 


Platelet Count


 164 x10^3/uL


(140-400)








Microbiology


4/14/22 Gram Stain - Final, Complete


          


4/14/22 Aerobic and Anaerobic Culture - Final, Complete


Medications





Current Medications


Aspirin (Aspirin Chewable) 324 mg 1X  ONCE PO  Last administered on 4/12/22at 

18:30;  Start 4/12/22 at 18:15;  Stop 4/12/22 at 18:16;  Status DC


Albuterol/ Ipratropium (Duoneb) 3 ml 1X  ONCE NEB  Last administered on 

4/12/22at 20:08;  Start 4/12/22 at 19:45;  Stop 4/12/22 at 19:46;  Status DC


Iohexol (Omnipaque 350 Mg/ml) 75 ml 1X  ONCE IV  Last administered on 4/12/22at 

19:45;  Start 4/12/22 at 19:45;  Stop 4/12/22 at 19:46;  Status DC


Acetaminophen (Tylenol) 650 mg PRN Q6HRS  PRN PO MILD PAIN / TEMP > 100.3'F;  

Start 4/12/22 at 21:15;  Stop 4/15/22 at 10:53;  Status DC


Ondansetron HCl (Zofran) 4 mg PRN Q4HRS  PRN IVP NAUSEA/VOMITING 1st choice;  

Start 4/12/22 at 21:15;  Stop 4/16/22 at 16:07;  Status DC


Guaifenesin (Robitussin Dm) 10 ml PRN Q6HRS  PRN PO COUGH;  Start 4/12/22 at 

21:15


Furosemide (Lasix) 40 mg 1X  ONCE IVP  Last administered on 4/12/22at 21:30;  

Start 4/12/22 at 21:30;  Stop 4/12/22 at 21:31;  Status DC


Morphine Sulfate (Morphine Sulfate) 2 mg PRN Q6HRS  PRN IVP SEVERE PAIN 7-10 

Last administered on 4/16/22at 05:30;  Start 4/13/22 at 00:00


Sennosides (Senna) 17.2 mg PRN BID  PRN PO CONSTIPATION;  Start 4/13/22 at 09:00


Docusate Sodium (Colace) 100 mg PRN DAILY  PRN PO HARD STOOLS;  Start 4/13/22 at

09:00


Ondansetron HCl (Zofran) 4 mg PRN Q6HRS  PRN IVP NAUSEA/VOMITING, 1st CHOICE;  

Start 4/13/22 at 09:00


Dextrose (Dextrose 50%-Water Syringe) 12.5 gm PRN Q15MIN  PRN IV SEE COMMENTS;  

Start 4/13/22 at 09:00


Acetaminophen (Tylenol) 650 mg PRN Q4HRS  PRN PO TEMP OVER 100.4F OR MILD PAIN; 

Start 4/13/22 at 09:00


Lorazepam (Ativan) 0.5 mg PRN Q6HRS  PRN PO ANXIETY / AGITATION Last 

administered on 4/19/22at 12:22;  Start 4/13/22 at 09:00


Lorazepam (Ativan Inj) 0.25 mg PRN Q4HRS  PRN IV ANXIETY / AGITATION;  Start 

4/13/22 at 09:00


Enoxaparin Sodium (Lovenox 40mg Syringe) 40 mg Q24H SQ ;  Start 4/13/22 at 

09:00;  Stop 4/13/22 at 11:01;  Status DC


Prochlorperazine Edisylate (Compazine) 10 mg PRN Q6HRS  PRN IV NAUSEA/VOMITING, 

2nd CHOICE;  Start 4/13/22 at 09:00


Diphenhydramine HCl (Benadryl) 25 mg PRN Q6HRS  PRN IVP ITCHING;  Start 4/13/22 

at 09:00


Diphenhydramine HCl (Benadryl) 25 mg PRN Q6HRS  PRN PO ITCHING;  Start 4/13/22 

at 09:00


Diphenhydramine HCl (Benadryl) 25 mg PRN QHS  PRN PO INSOMNIA, 1st CHOICE Last 

administered on 4/18/22at 21:32;  Start 4/13/22 at 09:00


Zolpidem Tartrate (Ambien) 2.5 mg PRN QHS  PRN PO INSOMNIA, 2nd CHOICE;  Start 

4/13/22 at 09:00


Atorvastatin Calcium (Lipitor) 40 mg HS PO  Last administered on 4/19/22at 

21:14;  Start 4/13/22 at 21:00


Metoprolol Succinate (Toprol Xl) 25 mg DAILY PO  Last administered on 4/19/22at 

09:21;  Start 4/13/22 at 10:30


Diclofenac Sodium (Voltaren) 1 jatin BID TP  Last administered on 4/19/22at 21:14;

 Start 4/13/22 at 11:00


Furosemide (Lasix) 40 mg 1X  ONCE IVP  Last administered on 4/13/22at 11:33;  

Start 4/13/22 at 11:30;  Stop 4/13/22 at 11:31;  Status DC


Potassium Chloride (Klor-Con) 20 meq 1X  ONCE PO  Last administered on 4/13/22at

11:33;  Start 4/13/22 at 11:30;  Stop 4/13/22 at 11:31;  Status DC


Fluticasone Propionate (Flonase) 1 spray BID NS  Last administered on 4/19/22at 

21:14;  Start 4/13/22 at 21:00


Tamsulosin HCl (Flomax) 0.4 mg BID PO  Last administered on 4/19/22at 21:13;  

Start 4/13/22 at 21:00


Ceftriaxone Sodium (Rocephin) 1 gm Q24H IVP  Last administered on 4/14/22at 

12:44;  Start 4/14/22 at 13:00;  Stop 4/15/22 at 07:17;  Status DC


Apixaban (Eliquis) 5 mg BID PO ;  Start 4/14/22 at 21:00;  Stop 4/15/22 at 

10:52;  Status DC


Furosemide (Lasix) 40 mg DAILY PO  Last administered on 4/19/22at 09:22;  Start 

4/14/22 at 14:00


Info (Anti-Coagulation Monitoring By Pharmacy) 1 each PRN DAILY  PRN MC PER 

PROTOCOL;  Start 4/14/22 at 14:15


Lactobacillus Rhamnosus (Culturelle) 1 cap BID PO  Last administered on 

4/19/22at 21:13;  Start 4/14/22 at 21:00


Norepinephrine Bitartrate 8 mg/ Dextrose 258 ml @  11.262 mls/ hr CONT  PRN IV 

PER PROTOCOL Last administered on 4/16/22at 10:31;  Start 4/15/22 at 00:00


Heparin Sodium/ Dextrose 250 ml @  6.984 mls/ hr CONT  PRN IV PER PROTOCOL;  

Start 4/15/22 at 04:15;  Stop 4/15/22 at 04:28;  Status DC


Heparin Sodium (Porcine) (Heparin Sodium) 1,450 unit PRN Q6HRS  PRN IV FOR UFH 

LEVEL LESS THAN 0.2;  Start 4/15/22 at 04:15;  Stop 4/15/22 at 04:28;  Status DC


Heparin Sodium/ Dextrose 250 ml @  6.984 mls/ hr CONT  PRN IV PER PROTOCOL Last 

administered on 4/18/22at 13:42;  Start 4/15/22 at 04:30


Heparin Sodium (Porcine) (Heparin Sodium) 1,450 unit PRN Q6HRS  PRN IV FOR PTT <

40 Last administered on 4/15/22at 23:24;  Start 4/15/22 at 04:30


Piperacillin Sod/ Tazobactam Sod (Zosyn Per Pharmacy) 1 each PRN DAILY  PRN MC 

SEE COMMENTS;  Start 4/15/22 at 07:30


Piperacillin Sod/ Tazobactam Sod 3.375 gm/Sodium Chloride 50 ml @  100 mls/hr 

Q6HRS IV  Last administered on 4/20/22at 06:59;  Start 4/15/22 at 07:30


Dobutamine HCl/ Dextrose 250 ml @  4.073 mls/ hr CONT  PRN IV SEE I/O RECORD;  

Start 4/15/22 at 10:30


Lidocaine HCl (Xylocaine-Mpf 1% 2ml Vial) 2 ml STK-MED ONCE .ROUTE ;  Start 

4/15/22 at 12:13;  Stop 4/15/22 at 12:13;  Status DC


Iodixanol (Visipaque 320) 100 ml STK-MED ONCE .ROUTE ;  Start 4/15/22 at 12:13; 

Stop 4/15/22 at 12:13;  Status DC


Heparin Sodium/ Sodium Chloride 1,500 ml @  As Directed STK-MED ONCE .ROUTE ;  

Start 4/15/22 at 12:13;  Stop 4/15/22 at 12:14;  Status DC


Fentanyl Citrate (Fentanyl 2ml Vial) 100 mcg STK-MED ONCE .ROUTE ;  Start 

4/15/22 at 12:40;  Stop 4/15/22 at 12:40;  Status DC


Midazolam HCl (Versed) 2 mg STK-MED ONCE .ROUTE ;  Start 4/15/22 at 12:40;  Stop

4/15/22 at 12:40;  Status DC


Heparin Sodium (Porcine) (Heparin Sodium) 10,000 unit STK-MED ONCE .ROUTE ;  

Start 4/15/22 at 12:40;  Stop 4/15/22 at 12:40;  Status DC


Verapamil HCl (Verapamil) 5 mg STK-MED ONCE .ROUTE ;  Start 4/15/22 at 12:40;  

Stop 4/15/22 at 12:40;  Status DC


Nitroglycerin (Nitroglycerin) 200 mcg STK-MED ONCE .ROUTE ;  Start 4/15/22 at 

12:40;  Stop 4/15/22 at 12:41;  Status DC


Nitroglycerin (Nitroglycerin) 200 mcg 1X  ONCE IART  Last administered on 

4/15/22at 13:39;  Start 4/15/22 at 13:00;  Stop 4/15/22 at 13:01;  Status DC


Verapamil HCl (Verapamil) 2.5 mg 1X  ONCE IART  Last administered on 4/15/22at 

13:40;  Start 4/15/22 at 13:00;  Stop 4/15/22 at 13:01;  Status DC


Heparin Sodium (Porcine) (Heparin Sodium) 2,500 unit 1X  ONCE IART  Last 

administered on 4/15/22at 13:38;  Start 4/15/22 at 13:00;  Stop 4/15/22 at 

13:01;  Status DC


Heparin Sodium/ Sodium Chloride (HEPARIN for ARTERIAL LINE FLUSH) 1,000 unit 1X 

ONCE IART  Last administered on 4/15/22at 13:33;  Start 4/15/22 at 13:00;  Stop 

4/15/22 at 13:01;  Status DC


Heparin Sodium/ Sodium Chloride (HEPARIN for ARTERIAL LINE FLUSH) 1,000 unit 1X 

ONCE IART  Last administered on 4/15/22at 13:33;  Start 4/15/22 at 13:00;  Stop 

4/15/22 at 13:01;  Status DC


Iodixanol (Visipaque 320) 100 ml 1X  ONCE IART  Last administered on 4/15/22at 

13:33;  Start 4/15/22 at 13:00;  Stop 4/15/22 at 13:01;  Status DC


Lidocaine HCl (Xylocaine-Mpf 1% 2ml Vial) 2 ml 1X  ONCE INJ  Last administered 

on 4/15/22at 13:34;  Start 4/15/22 at 13:00;  Stop 4/15/22 at 13:01;  Status DC


Info (CONTRAST GIVEN -- Rx MONITORING) 1 each PRN DAILY  PRN MC SEE COMMENTS;  

Start 4/15/22 at 13:00;  Stop 4/17/22 at 12:59;  Status DC


Lidocaine HCl (Lidocaine 1% 20ml Vial) 20 ml STK-MED ONCE .ROUTE ;  Start 

4/15/22 at 13:06;  Stop 4/15/22 at 13:06;  Status DC


Heparin Sodium (Porcine) (Heparin 5,000 Units/1,000ml NS) 5,000 unit 1X  ONCE IV

 Last administered on 4/15/22at 13:15;  Start 4/15/22 at 13:15;  Stop 4/15/22 at

13:16;  Status DC


Lidocaine HCl (Lidocaine 1% 20ml Vial) 20 ml 1X  ONCE INJ  Last administered on 

4/15/22at 13:45;  Start 4/15/22 at 13:45;  Stop 4/15/22 at 13:46;  Status DC


Potassium Chloride (Klor-Con) 40 meq 1X  ONCE PO  Last administered on 4/16/22at

08:00;  Start 4/16/22 at 07:30;  Stop 4/16/22 at 07:32;  Status DC


Clopidogrel Bisulfate (Plavix) 75 mg DAILYWBKFT PO  Last administered on 

4/19/22at 09:22;  Start 4/17/22 at 08:00


Clopidogrel Bisulfate (Plavix) 300 mg 1X  ONCE PO  Last administered on 

4/16/22at 10:49;  Start 4/16/22 at 10:45;  Stop 4/16/22 at 10:46;  Status DC


Iodixanol (Visipaque 320) 100 ml STK-MED ONCE .ROUTE ;  Start 4/18/22 at 14:05; 

Stop 4/18/22 at 14:05;  Status DC


Lidocaine HCl (Lidocaine 1% 20ml Vial) 20 ml STK-MED ONCE .ROUTE ;  Start 

4/18/22 at 14:05;  Stop 4/18/22 at 14:05;  Status DC


Heparin Sodium/ Sodium Chloride 500 ml @  As Directed STK-MED ONCE .ROUTE ;  

Start 4/18/22 at 14:05;  Stop 4/18/22 at 14:05;  Status DC


Fentanyl Citrate (Fentanyl 2ml Vial) 100 mcg STK-MED ONCE .ROUTE ;  Start 

4/18/22 at 14:17;  Stop 4/18/22 at 14:17;  Status DC


Midazolam HCl (Versed) 2 mg STK-MED ONCE .ROUTE ;  Start 4/18/22 at 14:17;  Stop

4/18/22 at 14:17;  Status DC


Heparin Sodium/ Sodium Chloride (HEPARIN for ARTERIAL LINE FLUSH) 1,000 unit 1X 

ONCE IART  Last administered on 4/18/22at 15:00;  Start 4/18/22 at 15:00;  Stop 

4/18/22 at 15:02;  Status DC


Heparin Sodium/ Sodium Chloride (HEPARIN for ARTERIAL LINE FLUSH) 1,000 unit 1X 

ONCE IART  Last administered on 4/18/22at 15:00;  Start 4/18/22 at 15:00;  Stop 

4/18/22 at 15:02;  Status DC


Midazolam HCl (Versed) 2 mg 1X  ONCE IV  Last administered on 4/18/22at 14:52;  

Start 4/18/22 at 15:00;  Stop 4/18/22 at 15:02;  Status DC


Fentanyl Citrate (Fentanyl 2ml Vial) 100 mcg 1X  ONCE IV  Last administered on 

4/18/22at 14:52;  Start 4/18/22 at 15:00;  Stop 4/18/22 at 15:02;  Status DC


Iodixanol (Visipaque 320) 100 ml 1X  ONCE IART  Last administered on 4/18/22at 

16:44;  Start 4/18/22 at 15:00;  Stop 4/18/22 at 15:02;  Status DC


Lidocaine HCl (Lidocaine 1% 20ml Vial) 20 ml 1X  ONCE INJ  Last administered on 

4/18/22at 14:57;  Start 4/18/22 at 15:00;  Stop 4/18/22 at 15:02;  Status DC


Info (CONTRAST GIVEN -- Rx MONITORING) 1 each PRN DAILY  PRN MC SEE COMMENTS;  

Start 4/18/22 at 15:15;  Stop 4/20/22 at 15:14


Bivalirudin (Angiomax) 250 mg STK-MED ONCE IVP ;  Start 4/18/22 at 15:16;  Stop 

4/18/22 at 15:17;  Status DC


Bivalirudin (Angiomax) 250 mg 1X  ONCE IVP  Last administered on 4/18/22at 

15:18;  Start 4/18/22 at 15:30;  Stop 4/18/22 at 15:31;  Status DC


Nitroglycerin (Nitroglycerin) 200 mcg 1X  ONCE IART  Last administered on 

4/18/22at 15:45;  Start 4/18/22 at 15:45;  Stop 4/18/22 at 15:46;  Status DC


Iodixanol (Visipaque 320) 100 ml STK-MED ONCE .ROUTE ;  Start 4/18/22 at 15:57; 

Stop 4/18/22 at 15:58;  Status DC


Heparin Sodium/ Sodium Chloride 500 ml @  As Directed STK-MED ONCE .ROUTE ;  

Start 4/18/22 at 16:35;  Stop 4/18/22 at 16:35;  Status DC


Potassium Bicarbonate (Potassium Effervescent Tablet) 20 meq 1X  ONCE PEG  Last 

administered on 4/19/22at 10:45;  Start 4/19/22 at 11:00;  Stop 4/19/22 at 

11:01;  Status DC





Active Scripts


Active


Furosemide 40 Mg Tablet 1 Tab PO DAILY 30 Days


Metoprolol Succinate ( Xl ) (Metoprolol Succinate) 25 Mg Tab.er.24h 25 Mg PO 

DAILY 30 Days


Eliquis (Apixaban) 5 Mg Tablet 5 Mg PO BID 30 Days


Reported


Stiolto Respimat Inhal Spray (Tiotropium Br/Olodaterol HCl) 4 Gm Mist.inhal 4 Gm

IH HS


Flomax (Tamsulosin Hcl) 0.4 Mg Cap.er.24h 1 Cap PO BID


Refresh Lacri-Lube Ointment (Mineral Oil/Petrolatum,White) 3.5 Gm Oint...g. 3.5 

Gm OU DAILY


Atorvastatin Calcium 20 Mg Tablet 2 Tab PO HS


Omeprazole 20 Mg Capsule.dr 1 Cap PO DAILY


Lubricating Plus (Carboxymethylcellulose Sodium) 1 Each Droperette 1 OU DAILY


Proair Hfa Inhaler (Albuterol Sulfate) 8.5 Gm Hfa.aer.ad 1 Puff INH PRN Q6HRS 

PRN


Aspirin 325 Mg Tablet 1 Tab PO DAILY


Vitals/I & O





Vital Sign - Last 24 Hours








 4/19/22 4/19/22 4/19/22 4/19/22





 09:00 09:21 10:00 11:00


 


Pulse 88 87 84 77


 


Resp 21 21 21


 


B/P (MAP) 135/67 135/67 127/75 125/71


 


Pulse Ox 100  100 100


 


O2 Delivery Room Air  Room Air Room Air





 4/19/22 4/19/22 4/19/22 4/19/22





 11:45 12:00 12:00 12:15


 


Temp   98.5 





   98.5 


 


Pulse 92  96 92


 


Resp 21  14 17


 


B/P (MAP) 95/56  107/62 100/51


 


Pulse Ox 100  100 100


 


O2 Delivery Room Air Room Air Room Air Room Air


 


    





    





 4/19/22 4/19/22 4/19/22 4/19/22





 12:30 13:00 13:30 14:00


 


Pulse 96 92 92 96


 


Resp 21 16 18 16


 


B/P (MAP) 102/62 100/55 92/56 94/57


 


Pulse Ox 100 100 100 100


 


O2 Delivery Room Air Room Air Room Air Room Air





 4/19/22 4/19/22 4/19/22 4/19/22





 14:00 15:00 16:00 16:00


 


Temp    98.6





    98.6


 


Pulse 92 93  96


 


Resp 18 18  18


 


B/P (MAP) 92/58 96/54  96/66


 


Pulse Ox 99 97  98


 


O2 Delivery Room Air Room Air Room Air Room Air


 


    





    





 4/19/22 4/19/22 4/19/22 4/19/22





 17:00 18:00 20:00 20:00


 


Temp    98.2





    98.2


 


Pulse 88 91  85


 


Resp 18 18 22


 


B/P (MAP) 102/55 98/58  99/52 (68)


 


Pulse Ox 99 99  99


 


O2 Delivery Room Air Room Air Room Air Room Air


 


    





    





 4/20/22   





 04:00   


 


Temp 97.9   





 97.9   


 


Pulse 91   


 


Resp 20   


 


B/P (MAP) 93/57 (69)   


 


Pulse Ox 98   


 


O2 Delivery Room Air   














Intake and Output   


 


 4/19/22 4/19/22 4/20/22





 15:00 23:00 07:00


 


Intake Total 270 ml 480 ml 320 ml


 


Output Total 255 ml 475 ml 325 ml


 


Balance 15 ml 5 ml -5 ml











Justicifation of Admission Dx:


Justifications for Admission:


Justification of Admission Dx:  Yes











BELLA ALVAREZ MD           Apr 20, 2022 08:45

## 2022-04-20 NOTE — PDOC
PULMONARY PROGRESS NOTES


DATE: 4/20/22 


TIME: 09:42


Subjective


Patient had intra-aortic balloon pump discontinued yesterday 


Not more short of air 


patient off of oxygen supplementation


No new complaints





Status post cardiac cath.  Severe three-vessel coronary artery disease and 

severe cardiomyopathy with an EF of 15%.  Status post intra-aortic balloon pump.

 


Status post bilateral thoracentesis at 4/14/2022


Vitals





Vital Signs








  Date Time  Temp Pulse Resp B/P (MAP) Pulse Ox O2 Delivery O2 Flow Rate FiO2


 


4/20/22 04:00 97.9 91 20 93/57 (69) 98 Room Air  





 97.9       








ROS:  No Nausea, No Chest Pain, No Abdominal Pain, No Increase Cough


General:  Alert, No acute distress


Lungs:  Other (Decreased at the bases.)


Cardiovascular:  S1, S2


Abdomen:  Soft, Non-tender


Extremities:  No Edema


Skin:  Warm


Labs





Laboratory Tests








Test


 4/19/22


05:30 4/20/22


05:02


 


White Blood Count


 9.7 x10^3/uL


(4.0-11.0) 9.4 x10^3/uL


(4.0-11.0)


 


Red Blood Count


 3.15 x10^6/uL


(4.30-5.70) 3.27 x10^6/uL


(4.30-5.70)


 


Hemoglobin


 8.4 g/dL


(13.0-17.5) 8.7 g/dL


(13.0-17.5)


 


Hematocrit


 25.7 %


(39.0-53.0) 26.7 %


(39.0-53.0)


 


Mean Corpuscular Volume 81 fL ()  82 fL () 


 


Mean Corpuscular Hemoglobin 27 pg (25-35)  27 pg (25-35) 


 


Mean Corpuscular Hemoglobin


Concent 33 g/dL


(31-37) 33 g/dL


(31-37)


 


Red Cell Distribution Width


 15.9 %


(11.5-14.5) 16.1 %


(11.5-14.5)


 


Platelet Count


 139 x10^3/uL


(140-400) 164 x10^3/uL


(140-400)


 


Neutrophils (%) (Auto) 79 % (31-73)  


 


Lymphocytes (%) (Auto) 9 % (24-48)  


 


Monocytes (%) (Auto) 9 % (0-9)  


 


Eosinophils (%) (Auto) 3 % (0-3)  


 


Basophils (%) (Auto) 1 % (0-3)  


 


Neutrophils # (Auto)


 7.7 x10^3/uL


(1.8-7.7) 





 


Lymphocytes # (Auto)


 0.9 x10^3/uL


(1.0-4.8) 





 


Monocytes # (Auto)


 0.9 x10^3/uL


(0.0-1.1) 





 


Eosinophils # (Auto)


 0.3 x10^3/uL


(0.0-0.7) 





 


Basophils # (Auto)


 0.1 x10^3/uL


(0.0-0.2) 





 


Prothrombin Time


 15.8 SEC


(11.7-14.0) 





 


Prothromb Time International


Ratio 1.3 (0.8-1.1) 


 





 


Activated Partial


Thromboplast Time 35 SEC (24-38) 


 





 


Sodium Level


 139 mmol/L


(136-145) 





 


Potassium Level


 3.6 mmol/L


(3.5-5.1) 





 


Chloride Level


 103 mmol/L


() 





 


Carbon Dioxide Level


 26 mmol/L


(21-32) 





 


Anion Gap 10 (6-14)  


 


Blood Urea Nitrogen


 22 mg/dL


(8-26) 





 


Creatinine


 1.2 mg/dL


(0.7-1.3) 





 


Estimated GFR


(Cockcroft-Gault) 58.7 


 





 


Glucose Level


 94 mg/dL


(70-99) 





 


Calcium Level


 8.4 mg/dL


(8.5-10.1) 





 


Magnesium Level


 2.0 mg/dL


(1.8-2.4) 











Laboratory Tests








Test


 4/20/22


05:02


 


White Blood Count


 9.4 x10^3/uL


(4.0-11.0)


 


Red Blood Count


 3.27 x10^6/uL


(4.30-5.70)


 


Hemoglobin


 8.7 g/dL


(13.0-17.5)


 


Hematocrit


 26.7 %


(39.0-53.0)


 


Mean Corpuscular Volume 82 fL () 


 


Mean Corpuscular Hemoglobin 27 pg (25-35) 


 


Mean Corpuscular Hemoglobin


Concent 33 g/dL


(31-37)


 


Red Cell Distribution Width


 16.1 %


(11.5-14.5)


 


Platelet Count


 164 x10^3/uL


(140-400)








Medications





Active Scripts








 Medications  Dose


 Route/Sig


 Max Daily Dose Days Date Category


 


 Flomax


  (Tamsulosin Hcl)


 0.4 Mg Cap.er.24h  1 Cap


 PO BID


   4/13/22 Reported


 


 Furosemide 40 Mg


 Tablet  1 Tab


 PO DAILY


  30 2/7/22 Rx


 


 Metoprolol


 Succinate ( Xl )


  (Metoprolol


 Succinate) 25 Mg


 Tab.er.24h  25 Mg


 PO DAILY


  30 2/7/22 Rx


 


 Eliquis


  (Apixaban) 5 Mg


 Tablet  5 Mg


 PO BID


  30 2/7/22 Rx


 


 Refresh


 Lacri-Lube


 Ointment (Mineral


 Oil/Petrolatum,White)


 3.5 Gm Oint...g.  3.5 Gm


 OU DAILY


   2/4/22 Reported


 


 Atorvastatin


 Calcium 20 Mg


 Tablet  2 Tab


 PO HS


   2/4/22 Reported


 


 Omeprazole 20 Mg


 Capsule.dr  1 Cap


 PO DAILY


   2/4/22 Reported


 


 Lubricating Plus


  (Carboxymethylcellulose


 Sodium) 1 Each


 Droperette  1


 OU DAILY


   2/4/22 Reported


 


 Fluticasone


 Propionate Nasal


 Spray


  (Fluticasone


 Propionate) 16 Gm


 Spray.susp  1 Sprays


 NS BID


   2/4/22 Reported


 


 Proair Hfa


 Inhaler


  (Albuterol


 Sulfate) 8.5 Gm


 Hfa.aer.ad  1 Puff


 INH PRN Q6HRS PRN


   7/25/18 Reported


 


 Aspirin 325 Mg


 Tablet  1 Tab


 PO DAILY


   8/3/15 Reported








Comments


Chest x-ray reviewed 4/16/2022.


Evidence of interstitial edema.





Chest x-ray reviewed 4/15/2022.


Diffuse bilateral interstitial infiltrates





Impression


.


1.  Acute hypoxic respiratory failure secondary to acute on chronic systolic 


heart failure with bilateral pleural effusions.  Status post thoracentesis 

4/14/2022


2.  Low-grade fever.  Influenza and Covid negative.  Possible pneumonia


3.  Abnormal CT chest with bilateral pleural effusion, which has been persistent


and moderate.  Status post bilateral thoracentesis 4/14/2022


4.  Underlying chronic obstructive pulmonary disease with 30 years of 

tobaccoism.


5.  Hypotensin secondary to cardiogenic shock


6.  Encephalopathy,  multifactorial


7.  Status post cardiac cath.  Severe three-vessel coronary artery disease and 

severe cardiomyopathy.  Status post intra-aortic balloon pump.


8.  Cardiogenic shock.





Cardiac cath report.





Conclusion


1.  Cardiogenic shock with acute on chronic systolic and diastolic heart failure


2.  Severe three-vessel coronary artery disease


3.  Successful placement of a 7.5 Serbian 40 cc Intermedic balloon pump for 

cardiac shock








Recommendations


1.  Continue medication optimization.  Given the patient's renal failure and 

significant cardiomyopathy with three-vessel coronary artery disease further 

intervention was deferred today in an effort to discuss the risks and benefits 

of high risk PCI with the patient's family and the patient's primary 

cardiologist Dr. Ovalle





Signed by : Avery Lester, 


Electronically Approved : 04/15/2022 17:49:28





Plan


.


Updated 4/20


Off of oxygen supplementation


All cultures negative, pleural fluid


Will discontinue antibiotics


Follow cardiology input








Updated 4/19


Continue current support


Intra-aortic balloon pump per cardiology


Patient off of oxygen


Continue empiric antibiotics








Updated 4/18/2022


1.  Pulmonary status stable.  On room air.  Status post thoracentesis.  Follow 

pleuralfluid analysis.  Eliquis on hold.  On heparin per protocol.


2.  Continue present oxygen. 


3.  Ruled out for influenza and COVID.  Pneumonia is likely possibility for the 

source of fever.  On antibiotics.  Clinically improving


4.  Status post cardiac cath.  Currently on intra-aortic balloon pump.  Severe 

cardiomyopathy and severe three-vessel coronary artery disease.  Cardiology to 

consider PCI later today.


5.  Normal pro calcitonin 


6.  Cardiology recommendations.


7.  Discussed with SACHA MATAMOROS MD              Apr 20, 2022 09:42

## 2022-04-21 VITALS — SYSTOLIC BLOOD PRESSURE: 86 MMHG | DIASTOLIC BLOOD PRESSURE: 58 MMHG

## 2022-04-21 VITALS — SYSTOLIC BLOOD PRESSURE: 103 MMHG | DIASTOLIC BLOOD PRESSURE: 64 MMHG

## 2022-04-21 VITALS — SYSTOLIC BLOOD PRESSURE: 83 MMHG | DIASTOLIC BLOOD PRESSURE: 59 MMHG

## 2022-04-21 VITALS — DIASTOLIC BLOOD PRESSURE: 68 MMHG | SYSTOLIC BLOOD PRESSURE: 116 MMHG

## 2022-04-21 VITALS — SYSTOLIC BLOOD PRESSURE: 92 MMHG | DIASTOLIC BLOOD PRESSURE: 67 MMHG

## 2022-04-21 RX ADMIN — ASPIRIN SCH MG: 81 TABLET, COATED ORAL at 09:55

## 2022-04-21 RX ADMIN — FLUTICASONE PROPIONATE SCH SPRAY: 50 SPRAY, METERED NASAL at 09:57

## 2022-04-21 RX ADMIN — DICLOFENAC SODIUM SCH APP: 10 GEL TOPICAL at 09:56

## 2022-04-21 RX ADMIN — FUROSEMIDE SCH MG: 40 TABLET ORAL at 09:56

## 2022-04-21 RX ADMIN — CLOPIDOGREL BISULFATE SCH MG: 75 TABLET ORAL at 09:55

## 2022-04-21 RX ADMIN — Medication SCH CAP: at 09:56

## 2022-04-21 RX ADMIN — TAMSULOSIN HYDROCHLORIDE SCH MG: 0.4 CAPSULE ORAL at 09:56

## 2022-04-21 RX ADMIN — METOPROLOL SUCCINATE SCH MG: 25 TABLET, EXTENDED RELEASE ORAL at 10:36

## 2022-04-21 NOTE — NUR
Pt discharged via wheelchair to private vehicle. a/o x4, reviewed all new medication and 
continued medication. Reviewed follow up appointments. Post cath instructions. site all 
clean, dry and intact.

## 2022-04-21 NOTE — PDOC
DILLON MARTIN APRALBERTO 4/21/22 1015:


CARDIO Progress Notes


Date and Time


Date of Service


4/21/22


Time of Evaluation


1015





Subjective


Subjective:  No Chest Pain, No Palpitations, No Dizziness





Vitals


Vitals





Vital Signs








  Date Time  Temp Pulse Resp B/P (MAP) Pulse Ox O2 Delivery O2 Flow Rate FiO2


 


4/21/22 08:00 98.7 91 16 86/58 (67) 98 Room Air  





 98.7       








Weight


Weight [ ]





Input and Output


Intake and Output











Intake and Output 


 


 4/21/22





 07:00


 


Intake Total 1180 ml


 


Output Total 672 ml


 


Balance 508 ml


 


 


 


Intake Oral 1180 ml


 


Output Urine Total 672 ml


 


# Bowel Movements 1











Microbiology


Micro





Microbiology


4/14/22 Gram Stain - Final, Complete


          


4/14/22 Aerobic and Anaerobic Culture - Final, Complete





Physical Exam


HEENT:  Neck Supple W Full Motion


Chest:  Symmetric


LUNGS:  Clear to Auscultation


Heart:  RRR (SR)


Abdomen:  Soft N/T


Extremities:  No Edema


Neurology:  alert, oriented, follow commands





Assessment


Assessment


1.  Acute on chronic respiratory failure with a/c CHF, PNA, and bilateral 

pleural effusion. s/p thoracentesis with 800cc off bilaterally.


2.  Chest pain, mixed features; AMI ruled out.


3.  Acute on chronic systolic CHF; s/p IV Lasix 


4.  Ischemic cardiomyopathy, cardiogenic shock; improved. s/p IABP removal. LVEF

15-20%. 


5.  PAFIB with RVR; on metoprolol for rate control. Eliquis held for PCI


6.  3V CAD s/p successful high risk orbital atherectomy/PCI/KRYSTYNA to LAD and RCA, 

and successful PCI/KRYSTYNA second OM branch of LCx


7.  Hypertension; low end


8.  Hyperlipidemia; statin 


9.  Diabetes, II


10.  Peripheral vascular disease.  Patient denies claudication symptoms.


11.  H/o CVA


12.  TERRY; cardiorenal.  improved 








Recommendations





Secondary prevention


Continue Plavix. ASA 81 mg x1 month only as patient is on Eliquis 


Risk stratification modification 


Cardiac rehab referral 


Continue Toprol for rate control


No ACEi/ARB with low end blood pressure


Okay to discharge from a CV standpoint and follow up with primary cardiologist 

as scheduled





Justicifation of Admission Dx:


Justifications for Admission:


Justification of Admission Dx:  Yes





JOHANNA HERBERT MD 4/22/22 0627:


CARDIO Progress Notes


Assessment


Assessment


Patient seen and examined.  Agree with NP's assessment and plan.


Non-STEMI s/p high risk multivessel PCI/KRYSTYNA to LAD and RCA and PCI/KRYSTYNA to 

OM/LCx, presently chest pain-free


Cardiogenic shock, acute on chronic systolic heart failure, better compensated 

and off pressors and IABP


Agree with resuming Eliquis.  Continue low-dose aspirin for 1 month then stop.  

Continue Plavix.


Cardiac rehabilitation referral.


F/U with DILLON Pena           Apr 21, 2022 10:15


JOHANNA HERBERT MD           Apr 22, 2022 06:27

## 2022-04-21 NOTE — PDOC
PULMONARY PROGRESS NOTES


DATE: 4/21/22 


TIME: 09:29


Subjective


No overnight events 





patient had intra-aortic balloon pump discontinued yesterday 


Not more short of air 


patient off of oxygen supplementation


No new complaints





Status post cardiac cath.  Severe three-vessel coronary artery disease and 

severe cardiomyopathy with an EF of 15%.  Status post intra-aortic balloon pump.

 


Status post bilateral thoracentesis at 4/14/2022


Vitals





Vital Signs








  Date Time  Temp Pulse Resp B/P (MAP) Pulse Ox O2 Delivery O2 Flow Rate FiO2


 


4/21/22 08:00 98.7 91 16 86/58 (67) 98 Room Air  





 98.7       








ROS:  No Nausea, No Chest Pain, No Abdominal Pain, No Increase Cough


General:  Alert, No acute distress


Lungs:  Other (Decreased at the bases.)


Cardiovascular:  S1, S2


Abdomen:  Soft, Non-tender


Extremities:  No Edema


Skin:  Warm


Labs





Laboratory Tests








Test


 4/20/22


05:02


 


White Blood Count


 9.4 x10^3/uL


(4.0-11.0)


 


Red Blood Count


 3.27 x10^6/uL


(4.30-5.70)


 


Hemoglobin


 8.7 g/dL


(13.0-17.5)


 


Hematocrit


 26.7 %


(39.0-53.0)


 


Mean Corpuscular Volume 82 fL () 


 


Mean Corpuscular Hemoglobin 27 pg (25-35) 


 


Mean Corpuscular Hemoglobin


Concent 33 g/dL


(31-37)


 


Red Cell Distribution Width


 16.1 %


(11.5-14.5)


 


Platelet Count


 164 x10^3/uL


(140-400)








Medications





Active Scripts








 Medications  Dose


 Route/Sig


 Max Daily Dose Days Date Category


 


 Flomax


  (Tamsulosin Hcl)


 0.4 Mg Cap.er.24h  1 Cap


 PO BID


   4/13/22 Reported


 


 Furosemide 40 Mg


 Tablet  1 Tab


 PO DAILY


  30 2/7/22 Rx


 


 Metoprolol


 Succinate ( Xl )


  (Metoprolol


 Succinate) 25 Mg


 Tab.er.24h  25 Mg


 PO DAILY


  30 2/7/22 Rx


 


 Eliquis


  (Apixaban) 5 Mg


 Tablet  5 Mg


 PO BID


  30 2/7/22 Rx


 


 Refresh


 Lacri-Lube


 Ointment (Mineral


 Oil/Petrolatum,White)


 3.5 Gm Oint...g.  3.5 Gm


 OU DAILY


   2/4/22 Reported


 


 Atorvastatin


 Calcium 20 Mg


 Tablet  2 Tab


 PO HS


   2/4/22 Reported


 


 Omeprazole 20 Mg


 Capsule.dr  1 Cap


 PO DAILY


   2/4/22 Reported


 


 Lubricating Plus


  (Carboxymethylcellulose


 Sodium) 1 Each


 Droperette  1


 OU DAILY


   2/4/22 Reported


 


 Fluticasone


 Propionate Nasal


 Spray


  (Fluticasone


 Propionate) 16 Gm


 Spray.susp  1 Sprays


 NS BID


   2/4/22 Reported


 


 Proair Hfa


 Inhaler


  (Albuterol


 Sulfate) 8.5 Gm


 Hfa.aer.ad  1 Puff


 INH PRN Q6HRS PRN


   7/25/18 Reported


 


 Aspirin 325 Mg


 Tablet  1 Tab


 PO DAILY


   8/3/15 Reported








Comments


Chest x-ray reviewed 4/16/2022.


Evidence of interstitial edema.





Chest x-ray reviewed 4/15/2022.


Diffuse bilateral interstitial infiltrates





Impression


.


1.  Acute hypoxic respiratory failure secondary to acute on chronic systolic 


heart failure with bilateral pleural effusions.  Status post thoracentesis 

4/14/2022


2.  Low-grade fever.  Influenza and Covid negative.  Possible pneumonia


3.  Abnormal CT chest with bilateral pleural effusion, which has been persistent


and moderate.  Status post bilateral thoracentesis 4/14/2022


4.  Underlying chronic obstructive pulmonary disease with 30 years of 

tobaccoism.


5.  Hypotensin secondary to cardiogenic shock


6.  Encephalopathy,  multifactorial


7.  Status post cardiac cath.  Severe three-vessel coronary artery disease and 

severe cardiomyopathy.  Status post intra-aortic balloon pump.


8.  Cardiogenic shock.





Cardiac cath report.





Conclusion


1.  Cardiogenic shock with acute on chronic systolic and diastolic heart failure


2.  Severe three-vessel coronary artery disease


3.  Successful placement of a 7.5 [place] 40 cc Intermedic balloon pump for 

cardiac shock








Recommendations


1.  Continue medication optimization.  Given the patient's renal failure and 

significant cardiomyopathy with three-vessel coronary artery disease further 

intervention was deferred today in an effort to discuss the risks and benefits 

of high risk PCI with the patient's family and the patient's primary 

cardiologist Dr. Ovalle





Signed by : Avery Lester, 


Electronically Approved : 04/15/2022 17:49:28





Plan


.


Updated 4/21


Patient to discharge home today respiratory status compensated


Off of oxygen supplementation


All cultures negative, pleural fluid


Will discontinue antibiotics


Follow cardiology input











SACHA IBARRA MD              Apr 21, 2022 09:29

## 2022-04-21 NOTE — NUR
SS following up with discharge planning. SS reviewed pt chart. Pt is currently on room air. 
COVID19 negative. PT/OT recommended home independent. Discharge order on the chart for home 
with self care.

## 2022-04-21 NOTE — DISCH
DISCHARGE INSTRUCTIONS


Condition on Discharge


Condition on Discharge:  Stable





Activity After Discharge


Activity Instructions for Disc:  Activity as tolerated


Driving Instructions after Dis:  Do not drive today


Weight Bearing Status after Di:  As tolerated





Diet after Discharge


Diet after Discharge:  Cardiac


Diet Texture:  No Mixed Consistencies





Checks after Discharge


Checks after discharge:  Check blood press - daily, Check your Temp as needed





Community/Resources/Services


Services at Discharge:  Outpatient Therapy (Cardiac Rehab)





Follow-Up


Follow up with:  PCP within 2 weeks of discharge


Follow Up With:  Cardiology as scheduled and as needed





Treatment/Equipment after DC


Adaptive Equipment Issued:  None











HILDA BROWN MD                  Apr 21, 2022 10:28